# Patient Record
Sex: FEMALE | Race: OTHER | HISPANIC OR LATINO
[De-identification: names, ages, dates, MRNs, and addresses within clinical notes are randomized per-mention and may not be internally consistent; named-entity substitution may affect disease eponyms.]

---

## 2023-05-31 PROBLEM — Z00.00 ENCOUNTER FOR PREVENTIVE HEALTH EXAMINATION: Status: ACTIVE | Noted: 2023-05-31

## 2023-06-13 ENCOUNTER — APPOINTMENT (OUTPATIENT)
Dept: CARDIOLOGY | Facility: CLINIC | Age: 58
End: 2023-06-13

## 2023-08-15 ENCOUNTER — APPOINTMENT (OUTPATIENT)
Dept: CARDIOLOGY | Facility: CLINIC | Age: 58
End: 2023-08-15

## 2023-08-16 ENCOUNTER — INPATIENT (INPATIENT)
Facility: HOSPITAL | Age: 58
LOS: 1 days | Discharge: HOME HEALTH | DRG: 349 | End: 2023-08-18
Attending: INTERNAL MEDICINE | Admitting: STUDENT IN AN ORGANIZED HEALTH CARE EDUCATION/TRAINING PROGRAM
Payer: MEDICAID

## 2023-08-16 VITALS
DIASTOLIC BLOOD PRESSURE: 64 MMHG | HEART RATE: 110 BPM | HEIGHT: 59 IN | RESPIRATION RATE: 18 BRPM | SYSTOLIC BLOOD PRESSURE: 109 MMHG | TEMPERATURE: 98 F | OXYGEN SATURATION: 100 % | WEIGHT: 110.01 LBS

## 2023-08-16 DIAGNOSIS — M86.9 OSTEOMYELITIS, UNSPECIFIED: ICD-10-CM

## 2023-08-16 LAB
ALBUMIN SERPL ELPH-MCNC: 4.2 G/DL — SIGNIFICANT CHANGE UP (ref 3.5–5.2)
ALP SERPL-CCNC: 120 U/L — HIGH (ref 30–115)
ALT FLD-CCNC: 10 U/L — SIGNIFICANT CHANGE UP (ref 0–41)
ANION GAP SERPL CALC-SCNC: 13 MMOL/L — SIGNIFICANT CHANGE UP (ref 7–14)
AST SERPL-CCNC: 11 U/L — SIGNIFICANT CHANGE UP (ref 0–41)
BASOPHILS # BLD AUTO: 0.06 K/UL — SIGNIFICANT CHANGE UP (ref 0–0.2)
BASOPHILS NFR BLD AUTO: 0.4 % — SIGNIFICANT CHANGE UP (ref 0–1)
BILIRUB SERPL-MCNC: <0.2 MG/DL — SIGNIFICANT CHANGE UP (ref 0.2–1.2)
BUN SERPL-MCNC: 17 MG/DL — SIGNIFICANT CHANGE UP (ref 10–20)
CALCIUM SERPL-MCNC: 10.2 MG/DL — SIGNIFICANT CHANGE UP (ref 8.4–10.5)
CHLORIDE SERPL-SCNC: 98 MMOL/L — SIGNIFICANT CHANGE UP (ref 98–110)
CO2 SERPL-SCNC: 19 MMOL/L — SIGNIFICANT CHANGE UP (ref 17–32)
CREAT SERPL-MCNC: 0.8 MG/DL — SIGNIFICANT CHANGE UP (ref 0.7–1.5)
CRP SERPL-MCNC: 64.1 MG/L — HIGH
EGFR: 86 ML/MIN/1.73M2 — SIGNIFICANT CHANGE UP
EOSINOPHIL # BLD AUTO: 0.15 K/UL — SIGNIFICANT CHANGE UP (ref 0–0.7)
EOSINOPHIL NFR BLD AUTO: 1 % — SIGNIFICANT CHANGE UP (ref 0–8)
ERYTHROCYTE [SEDIMENTATION RATE] IN BLOOD: 108 MM/HR — HIGH (ref 0–20)
GLUCOSE SERPL-MCNC: 104 MG/DL — HIGH (ref 70–99)
HCT VFR BLD CALC: 34 % — LOW (ref 37–47)
HGB BLD-MCNC: 12.1 G/DL — SIGNIFICANT CHANGE UP (ref 12–16)
IMM GRANULOCYTES NFR BLD AUTO: 0.9 % — HIGH (ref 0.1–0.3)
LACTATE SERPL-SCNC: 1.2 MMOL/L — SIGNIFICANT CHANGE UP (ref 0.7–2)
LYMPHOCYTES # BLD AUTO: 17 % — LOW (ref 20.5–51.1)
LYMPHOCYTES # BLD AUTO: 2.44 K/UL — SIGNIFICANT CHANGE UP (ref 1.2–3.4)
MCHC RBC-ENTMCNC: 32.6 PG — HIGH (ref 27–31)
MCHC RBC-ENTMCNC: 35.6 G/DL — SIGNIFICANT CHANGE UP (ref 32–37)
MCV RBC AUTO: 91.6 FL — SIGNIFICANT CHANGE UP (ref 81–99)
MONOCYTES # BLD AUTO: 1.26 K/UL — HIGH (ref 0.1–0.6)
MONOCYTES NFR BLD AUTO: 8.8 % — SIGNIFICANT CHANGE UP (ref 1.7–9.3)
NEUTROPHILS # BLD AUTO: 10.29 K/UL — HIGH (ref 1.4–6.5)
NEUTROPHILS NFR BLD AUTO: 71.9 % — SIGNIFICANT CHANGE UP (ref 42.2–75.2)
NRBC # BLD: 0 /100 WBCS — SIGNIFICANT CHANGE UP (ref 0–0)
PLATELET # BLD AUTO: 455 K/UL — HIGH (ref 130–400)
PMV BLD: 8.9 FL — SIGNIFICANT CHANGE UP (ref 7.4–10.4)
POTASSIUM SERPL-MCNC: 4.8 MMOL/L — SIGNIFICANT CHANGE UP (ref 3.5–5)
POTASSIUM SERPL-SCNC: 4.8 MMOL/L — SIGNIFICANT CHANGE UP (ref 3.5–5)
PROT SERPL-MCNC: 7.5 G/DL — SIGNIFICANT CHANGE UP (ref 6–8)
RBC # BLD: 3.71 M/UL — LOW (ref 4.2–5.4)
RBC # FLD: 12.7 % — SIGNIFICANT CHANGE UP (ref 11.5–14.5)
SODIUM SERPL-SCNC: 130 MMOL/L — LOW (ref 135–146)
WBC # BLD: 14.33 K/UL — HIGH (ref 4.8–10.8)
WBC # FLD AUTO: 14.33 K/UL — HIGH (ref 4.8–10.8)

## 2023-08-16 PROCEDURE — 86140 C-REACTIVE PROTEIN: CPT

## 2023-08-16 PROCEDURE — 73630 X-RAY EXAM OF FOOT: CPT | Mod: 26,LT

## 2023-08-16 PROCEDURE — 83735 ASSAY OF MAGNESIUM: CPT

## 2023-08-16 PROCEDURE — 80053 COMPREHEN METABOLIC PANEL: CPT

## 2023-08-16 PROCEDURE — 36415 COLL VENOUS BLD VENIPUNCTURE: CPT

## 2023-08-16 PROCEDURE — 99285 EMERGENCY DEPT VISIT HI MDM: CPT

## 2023-08-16 PROCEDURE — 83930 ASSAY OF BLOOD OSMOLALITY: CPT

## 2023-08-16 PROCEDURE — 73610 X-RAY EXAM OF ANKLE: CPT | Mod: 26,LT

## 2023-08-16 PROCEDURE — 85652 RBC SED RATE AUTOMATED: CPT

## 2023-08-16 PROCEDURE — 85025 COMPLETE CBC W/AUTO DIFF WBC: CPT

## 2023-08-16 RX ORDER — CEFEPIME 1 G/1
1000 INJECTION, POWDER, FOR SOLUTION INTRAMUSCULAR; INTRAVENOUS ONCE
Refills: 0 | Status: COMPLETED | OUTPATIENT
Start: 2023-08-16 | End: 2023-08-16

## 2023-08-16 RX ADMIN — CEFEPIME 100 MILLIGRAM(S): 1 INJECTION, POWDER, FOR SOLUTION INTRAMUSCULAR; INTRAVENOUS at 22:12

## 2023-08-16 NOTE — ED PROVIDER NOTE - CONSIDERATION OF ADMISSION OBSERVATION
Patient with suspected osteomyelitis, admitted for further management and care Consideration of Admission/Observation

## 2023-08-16 NOTE — ED ADULT NURSE NOTE - PRO INTERPRETER NEED 2
A: Met with Colby to explain  role and to discuss aftercare options.  R: Patient currently sees Dr. Barron at [facility]. Patient signed DILIP for above provider. Patient is aware of aftercare options. Patient is interest in IOP and confirmed virtual capabilities.  P: Will collaborate with treatment team and with the patient before setting up further aftercare, which will be complete by time of discharge.     Shae Franke, LCSW, [facility]  8/20/2020      
English

## 2023-08-16 NOTE — ED ADULT TRIAGE NOTE - CHIEF COMPLAINT QUOTE
Patient presents to ED c/o left foot pain r/t infection. Patient told she may need sx to remove hardware in foot

## 2023-08-16 NOTE — ED PROVIDER NOTE - CLINICAL SUMMARY MEDICAL DECISION MAKING FREE TEXT BOX
57-year-old female with suspected osteomyelitis of the left ankle.  Imaging independently interpreted by me.  Labs ordered and reviewed.  Vital signs reviewed.  Orthopedics consulted.  Patient admitted for further management and care.

## 2023-08-16 NOTE — ED PROVIDER NOTE - OBJECTIVE STATEMENT
57 years old female history of hypertension, COPD sent in by her podiatrist for osteomyelitis and infection of the hardware to her left ankle.  As per patient, she broke her ankle over 1 year ago.  The ankle was repaired by a podiatrist at [facility] at that time.  Patient started having a small wound over the left ankle over the past 6 months and noted drainage.  Patient has been following up with her new podiatrist for wound care every week.  Had an x-ray done 3 days ago which was different from a month ago so her podiatrist is concerned she may have a infection to the bone.  Patient otherwise denies fever, chills, left calf swelling, increasing redness, chest pain and shortness of breath.    Patient also reports she has not been able to step on the left ankle since the surgery secondary to severe pain.

## 2023-08-16 NOTE — ED PROVIDER NOTE - ATTENDING APP SHARED VISIT CONTRIBUTION OF CARE
37-year-old female everyday smoker, history of asthma history of left ankle fracture over a year ago status post Ortho repair at [facility] sent by her podiatrist to this hospital for management of bone infection.  Of note, patient was recently admitted to [facility] for hyponatremia, she had x-ray done during hospital admission, she was discharged a few days ago, her podiatrist called her today with information x-rays abnormal and urging her to come to this hospital. Patient reports a chronic nonhealing slowly draining wound at the site of prior surgery. Middle-aged female resting in stretcher no acute distress, PERRL, pink conjunctive a, speaking full sentences, equal air entry, lungs clear to auscultation, RRR, well-perfused extremities, abdomen soft and nontender to palpation, no midline spine or CVA TTP, there is some mild swelling of the left lateral ankle, there is 2 mm skin opening on the superior aspect of the surgical wound,  dressing is stained with small amount of yellowish fluid, there is warmth at the site without overt cellulitis, tenderness to palpation, Patient is awake alert x3, no gross neurodeficit.  Plan: X-ray, labs, admit for suspected osteomyelitis.

## 2023-08-16 NOTE — ED ADULT NURSE NOTE - OBJECTIVE STATEMENT
Pt. c/o L ankle pain. Pt. states PCP sent her to the ED due to abnormal x-ray results. As per pt. doctor stated she may have an infection of the L ankle bone. Pt. states she had surgery 1 year ago and has "a hole that never healed."

## 2023-08-16 NOTE — ED PROVIDER NOTE - PROGRESS NOTE DETAILS
spoke with ortho resident and aware of consultation. admit to medicine, they will follow the case. spoke with patient Podiatrist from [facility] Dr Montoya (office# [phone], cell#[phone]), patient had normal ankle xray about 2 months ago but xray from 3 days ago had a fracture/bone destruction above the hardware concerning for osteomyelitis.

## 2023-08-16 NOTE — ED PROVIDER NOTE - PHYSICAL EXAMINATION
CONSTITUTIONAL: Well-appearing; in no apparent distress.   EYES: PERRL; EOM intact.   CARDIOVASCULAR: Normal S1, S2; no murmurs, rubs, or gallops.   RESPIRATORY: Normal chest excursion with respiration; breath sounds clear and equal bilaterally; no wheezes, rhonchi, or rales.  GI/: Normal bowel sounds; non-distended; non-tender; no palpable organomegaly.   MS: TTP to lateral aspect of left ankle with painful ROM. 1+ DP pulse. left foot nv intact.   SKIN: a small skin ulce noted to lateral malleoli of left ankle.   NEURO/PSYCH: A & O x 4; grossly unremarkable.

## 2023-08-17 LAB
ALBUMIN SERPL ELPH-MCNC: 3.5 G/DL — SIGNIFICANT CHANGE UP (ref 3.5–5.2)
ALP SERPL-CCNC: 103 U/L — SIGNIFICANT CHANGE UP (ref 30–115)
ALT FLD-CCNC: 8 U/L — SIGNIFICANT CHANGE UP (ref 0–41)
ANION GAP SERPL CALC-SCNC: 12 MMOL/L — SIGNIFICANT CHANGE UP (ref 7–14)
AST SERPL-CCNC: 11 U/L — SIGNIFICANT CHANGE UP (ref 0–41)
BASOPHILS # BLD AUTO: 0.08 K/UL — SIGNIFICANT CHANGE UP (ref 0–0.2)
BASOPHILS NFR BLD AUTO: 0.7 % — SIGNIFICANT CHANGE UP (ref 0–1)
BILIRUB SERPL-MCNC: 0.2 MG/DL — SIGNIFICANT CHANGE UP (ref 0.2–1.2)
BUN SERPL-MCNC: 18 MG/DL — SIGNIFICANT CHANGE UP (ref 10–20)
CALCIUM SERPL-MCNC: 9.3 MG/DL — SIGNIFICANT CHANGE UP (ref 8.4–10.5)
CHLORIDE SERPL-SCNC: 101 MMOL/L — SIGNIFICANT CHANGE UP (ref 98–110)
CO2 SERPL-SCNC: 17 MMOL/L — SIGNIFICANT CHANGE UP (ref 17–32)
CREAT SERPL-MCNC: 0.7 MG/DL — SIGNIFICANT CHANGE UP (ref 0.7–1.5)
CRP SERPL-MCNC: 53.5 MG/L — HIGH
EGFR: 101 ML/MIN/1.73M2 — SIGNIFICANT CHANGE UP
EOSINOPHIL # BLD AUTO: 0.28 K/UL — SIGNIFICANT CHANGE UP (ref 0–0.7)
EOSINOPHIL NFR BLD AUTO: 2.3 % — SIGNIFICANT CHANGE UP (ref 0–8)
ERYTHROCYTE [SEDIMENTATION RATE] IN BLOOD: 88 MM/HR — HIGH (ref 0–20)
GLUCOSE SERPL-MCNC: 121 MG/DL — HIGH (ref 70–99)
HCT VFR BLD CALC: 31.5 % — LOW (ref 37–47)
HGB BLD-MCNC: 11 G/DL — LOW (ref 12–16)
IMM GRANULOCYTES NFR BLD AUTO: 1 % — HIGH (ref 0.1–0.3)
LYMPHOCYTES # BLD AUTO: 1.9 K/UL — SIGNIFICANT CHANGE UP (ref 1.2–3.4)
LYMPHOCYTES # BLD AUTO: 15.9 % — LOW (ref 20.5–51.1)
MAGNESIUM SERPL-MCNC: 1.9 MG/DL — SIGNIFICANT CHANGE UP (ref 1.8–2.4)
MCHC RBC-ENTMCNC: 32.3 PG — HIGH (ref 27–31)
MCHC RBC-ENTMCNC: 34.9 G/DL — SIGNIFICANT CHANGE UP (ref 32–37)
MCV RBC AUTO: 92.4 FL — SIGNIFICANT CHANGE UP (ref 81–99)
MONOCYTES # BLD AUTO: 1.23 K/UL — HIGH (ref 0.1–0.6)
MONOCYTES NFR BLD AUTO: 10.3 % — HIGH (ref 1.7–9.3)
NEUTROPHILS # BLD AUTO: 8.34 K/UL — HIGH (ref 1.4–6.5)
NEUTROPHILS NFR BLD AUTO: 69.8 % — SIGNIFICANT CHANGE UP (ref 42.2–75.2)
NRBC # BLD: 0 /100 WBCS — SIGNIFICANT CHANGE UP (ref 0–0)
OSMOLALITY SERPL: 273 MOS/KG — LOW (ref 275–300)
PLATELET # BLD AUTO: 402 K/UL — HIGH (ref 130–400)
PMV BLD: 8.8 FL — SIGNIFICANT CHANGE UP (ref 7.4–10.4)
POTASSIUM SERPL-MCNC: 4.3 MMOL/L — SIGNIFICANT CHANGE UP (ref 3.5–5)
POTASSIUM SERPL-SCNC: 4.3 MMOL/L — SIGNIFICANT CHANGE UP (ref 3.5–5)
PROT SERPL-MCNC: 6.5 G/DL — SIGNIFICANT CHANGE UP (ref 6–8)
RBC # BLD: 3.41 M/UL — LOW (ref 4.2–5.4)
RBC # FLD: 12.7 % — SIGNIFICANT CHANGE UP (ref 11.5–14.5)
SODIUM SERPL-SCNC: 130 MMOL/L — LOW (ref 135–146)
WBC # BLD: 11.95 K/UL — HIGH (ref 4.8–10.8)
WBC # FLD AUTO: 11.95 K/UL — HIGH (ref 4.8–10.8)

## 2023-08-17 PROCEDURE — 99222 1ST HOSP IP/OBS MODERATE 55: CPT | Mod: 1L

## 2023-08-17 PROCEDURE — 99223 1ST HOSP IP/OBS HIGH 75: CPT

## 2023-08-17 PROCEDURE — 99222 1ST HOSP IP/OBS MODERATE 55: CPT

## 2023-08-17 RX ORDER — CEFEPIME 1 G/1
2000 INJECTION, POWDER, FOR SOLUTION INTRAMUSCULAR; INTRAVENOUS EVERY 8 HOURS
Refills: 0 | Status: DISCONTINUED | OUTPATIENT
Start: 2023-08-17 | End: 2023-08-17

## 2023-08-17 RX ORDER — VANCOMYCIN HCL 1 G
500 VIAL (EA) INTRAVENOUS EVERY 12 HOURS
Refills: 0 | Status: DISCONTINUED | OUTPATIENT
Start: 2023-08-17 | End: 2023-08-17

## 2023-08-17 RX ORDER — ACETAMINOPHEN 500 MG
650 TABLET ORAL EVERY 6 HOURS
Refills: 0 | Status: DISCONTINUED | OUTPATIENT
Start: 2023-08-17 | End: 2023-08-18

## 2023-08-17 RX ORDER — ENOXAPARIN SODIUM 100 MG/ML
40 INJECTION SUBCUTANEOUS EVERY 24 HOURS
Refills: 0 | Status: DISCONTINUED | OUTPATIENT
Start: 2023-08-17 | End: 2023-08-18

## 2023-08-17 RX ORDER — AMLODIPINE BESYLATE 2.5 MG/1
2.5 TABLET ORAL DAILY
Refills: 0 | Status: DISCONTINUED | OUTPATIENT
Start: 2023-08-17 | End: 2023-08-18

## 2023-08-17 RX ADMIN — AMLODIPINE BESYLATE 2.5 MILLIGRAM(S): 2.5 TABLET ORAL at 06:27

## 2023-08-17 RX ADMIN — Medication 100 MILLIGRAM(S): at 06:27

## 2023-08-17 RX ADMIN — Medication 650 MILLIGRAM(S): at 09:51

## 2023-08-17 RX ADMIN — CEFEPIME 100 MILLIGRAM(S): 1 INJECTION, POWDER, FOR SOLUTION INTRAMUSCULAR; INTRAVENOUS at 05:50

## 2023-08-17 RX ADMIN — ENOXAPARIN SODIUM 40 MILLIGRAM(S): 100 INJECTION SUBCUTANEOUS at 05:50

## 2023-08-17 RX ADMIN — Medication 650 MILLIGRAM(S): at 10:45

## 2023-08-17 NOTE — PROGRESS NOTE ADULT - ASSESSMENT
The patient is a 57 year old female PMH HTN, Asthma, 40 pack year smoker presents with pain in left ankle.     #Possible OM of left ankle  -Patient underwent ORIF of a L ankle Fx in Aug 2022 at [facility], after which, she reports she has had a chronic draining sinus tract with a small wound at the lateral ankle that never healed.    -She follows with podiatrist Dr. Núñez at [facility]. She recently obtained an XR which demonstrated a fibular fracture at the proximal aspect of the distal fibula plate  -She recently saw Dr. Núñez who advised her to go to the hospital due to the concern that her hardware was infected.   -s/p 1 dose cefepime in ED  -c/w cefepime and start vanc  -X-ray foot and ankle performed, follow up read  -Weight Bearing Status; NWB LLE  -ID consult- no abx  -podiatry consult-patient would likely need hardware removal w/ intraop bone biopsy, Patient will continue to be followed by Ortho team at this time and no podiatry intervention is required  Patient can f/u as an outpatient with Dr Nathan if she chooses for wound care management  -ortho saw patient and recommended: NWB LLE, Trend ESR/CRP/WBC, s/p prof ankle with chronic draining lateral wound, sx done by dr núñez, either fu with dr núñez or podiatry, no ortho intervention    #Hyponatremia  -patient has history of hyponatremia and was previously hospitalized  -obtain urine Na and osm, serum osm    #HTN  -c/w home meds    DVT proph: lovenox subq  Gi ppx: n/a  Diet: Dash/TLC  Activity: NWB   The patient is a 57 year old female PMH HTN, Asthma, 40 pack year smoker presents with pain in left ankle.     #Possible OM of left ankle  -Patient underwent ORIF of a L ankle Fx in Aug 2022 at [facility], after which, she reports she has had a chronic draining sinus tract with a small wound at the lateral ankle that never healed.    -She follows with podiatrist Dr. Núñez at [facility]. She recently obtained an XR which demonstrated a fibular fracture at the proximal aspect of the distal fibula plate  -She recently saw Dr. Núñez who advised her to go to the hospital due to the concern that her hardware was infected.   -s/p 1 dose cefepime in ED  -c/w cefepime and start vanc  -X-ray foot and ankle performed, follow up read  -Weight Bearing Status; NWB LLE  -ID consult- no abx  -podiatry consult-patient would likely need hardware removal w/ intraop bone biopsy, Patient will continue to be followed by Ortho team at this time and no podiatry intervention is required  Patient can f/u as an outpatient with Dr Nathan if she chooses for wound care management  -ortho saw patient and recommended: NWB LLE, Trend ESR/CRP/WBC, s/p prof ankle with chronic draining lateral wound, sx done by dr núñez, either fu with dr núñez or podiatry, no ortho intervention  - upon personally speaking with the patient she does not want to leave and was told by Dr. Núñez that she would receive treatment here as the care provided at [facility] is inefficient    #Hyponatremia  -patient has history of hyponatremia and was previously hospitalized  -obtain urine Na and osm, serum osm    #HTN  -c/w home meds    DVT proph: lovenox subq  Gi ppx: n/a  Diet: Dash/TLC  Activity: NWB

## 2023-08-17 NOTE — H&P ADULT - NSHPLABSRESULTS_GEN_ALL_CORE
12.1   14.33 )-----------( 455      ( 16 Aug 2023 21:07 )             34.0       08-16    130<L>  |  98  |  17  ----------------------------<  104<H>  4.8   |  19  |  0.8    Ca    10.2      16 Aug 2023 21:07    TPro  7.5  /  Alb  4.2  /  TBili  <0.2  /  DBili  x   /  AST  11  /  ALT  10  /  AlkPhos  120<H>  08-16              Urinalysis Basic - ( 16 Aug 2023 21:07 )    Color: x / Appearance: x / SG: x / pH: x  Gluc: 104 mg/dL / Ketone: x  / Bili: x / Urobili: x   Blood: x / Protein: x / Nitrite: x   Leuk Esterase: x / RBC: x / WBC x   Sq Epi: x / Non Sq Epi: x / Bacteria: x            Lactate Trend  08-16 @ 21:07 Lactate:1.2             CAPILLARY BLOOD GLUCOSE

## 2023-08-17 NOTE — CONSULT NOTE ADULT - SUBJECTIVE AND OBJECTIVE BOX
BILLIE LUZ MARINA  57y, Female  Allergy: penicillins (Unknown)      All historical available data reviewed.    HPI:  The patient is a 57 year old female PMH HTN, Asthma, 40 pack year smoker presents with pain in left ankle. She underwent ORIF of a L ankle Fx in Aug 2022 at [facility], after which, she reports she has had a chronic draining sinus tract with a small wound at the lateral ankle that never healed. She reports that it drains serous fluid. She says that her ankle has been continuously painful since surgery. She follows with podiatrist Dr. Montoya at [facility]. She recently obtained an XR which demonstrated a fibular fracture at the proximal aspect of the distal fibula plate, which apparently had not been present on earlier imaging. She recently saw Dr. Montoya who advised her to go to the hospital due to the concern that her hardware was infected.  She denies fever, chills, n/v, chest pain, sob, she only complains of left ankle pain.    In ED vitals significant for   Labs: WBC 14.33, , Na 130, CRP 64  X-ray foot and ankle performed  Patient was given cefepime in ED (17 Aug 2023 03:23)    FAMILY HISTORY:    PAST MEDICAL & SURGICAL HISTORY:  HTN (hypertension)      COPD (chronic obstructive pulmonary disease)            VITALS:  T(F): 97, Max: 98.3 (08-16-23 @ 18:40)  HR: 89  BP: 128/67  RR: 18Vital Signs Last 24 Hrs  T(C): 36.1 (17 Aug 2023 05:04), Max: 36.8 (16 Aug 2023 18:40)  T(F): 97 (17 Aug 2023 05:04), Max: 98.3 (16 Aug 2023 18:40)  HR: 89 (17 Aug 2023 05:04) (75 - 110)  BP: 128/67 (17 Aug 2023 05:04) (109/64 - 128/67)  BP(mean): --  RR: 18 (17 Aug 2023 05:04) (18 - 18)  SpO2: 100% (17 Aug 2023 00:00) (100% - 100%)    Parameters below as of 17 Aug 2023 00:00  Patient On (Oxygen Delivery Method): room air        TESTS & MEASUREMENTS:                        12.1   14.33 )-----------( 455      ( 16 Aug 2023 21:07 )             34.0     08-16    130<L>  |  98  |  17  ----------------------------<  104<H>  4.8   |  19  |  0.8    Ca    10.2      16 Aug 2023 21:07    TPro  7.5  /  Alb  4.2  /  TBili  <0.2  /  DBili  x   /  AST  11  /  ALT  10  /  AlkPhos  120<H>  08-16    LIVER FUNCTIONS - ( 16 Aug 2023 21:07 )  Alb: 4.2 g/dL / Pro: 7.5 g/dL / ALK PHOS: 120 U/L / ALT: 10 U/L / AST: 11 U/L / GGT: x             Urinalysis Basic - ( 16 Aug 2023 21:07 )    Color: x / Appearance: x / SG: x / pH: x  Gluc: 104 mg/dL / Ketone: x  / Bili: x / Urobili: x   Blood: x / Protein: x / Nitrite: x   Leuk Esterase: x / RBC: x / WBC x   Sq Epi: x / Non Sq Epi: x / Bacteria: x          RADIOLOGY & ADDITIONAL TESTS:  Personal review of radiological diagnostics performed  Echo and EKG results noted when applicable.     MEDICATIONS:  acetaminophen     Tablet .. 650 milliGRAM(s) Oral every 6 hours PRN  amLODIPine   Tablet 2.5 milliGRAM(s) Oral daily  enoxaparin Injectable 40 milliGRAM(s) SubCutaneous every 24 hours      ANTIBIOTICS:

## 2023-08-17 NOTE — CONSULT NOTE ADULT - ATTENDING COMMENTS
s/p left ankle ORIF at [facility] a year ago. Patient reports persistent ankle pain and a chronic ankle ulcer  noted small ulceration at lateral malleolus. no active drainage appreciated. TTP  xrays: fibular fracture just proximal to the hardware.    concerning for chronic OM  patient would likely need hardware removal w/ intraop bone biopsy  ID on board  ortho on board--> follow up plan/surgical intervention?
s/p prof ankle with chronic draining lateral wound  sx done by dr núñez  podiatry involved  either fu with dr núñez or podiatry  no ortho intervention

## 2023-08-17 NOTE — PATIENT PROFILE ADULT - FALL HARM RISK - HARM RISK INTERVENTIONS

## 2023-08-17 NOTE — H&P ADULT - ATTENDING COMMENTS
Patient seen and examined at bedside independently of the residents. I read the resident's note and agree with the plan with the additions and corrections as noted below. My note supersedes the resident's note.     REVIEW OF SYSTEMS:  Negative except in HPI.       PMH: Asthma, daily smoker and left ankle fracture s/p ORIF at [facility] (1 yr ago)    FHx: Reviewed. No fhx of asthma/copd, No fhx of liver and pulmonary disease. No fhx of hematological disorder.     Physical Exam:  GEN: No acute distress. Awake, Alert and oriented x 3.   Head: Atraumatic, Normocephalic.   Eye: PEERLA. No sclera icterus. EOMI.   ENT: Normal oropharynx, no thyromegaly, no mass, no lymphadenopathy.   LUNGS: Clear to auscultation bilaterally. No wheeze/rales/crackles.   HEART: Normal. S1/S2 present. RRR. No murmur/gallops.   ABD: Soft, non-tender, non-distended. Bowel sounds present.   EXT: No pitting edema. No erythema. + left ankle sinus draining wound a/w mild erythema and tenderness.   Integumentary: No rash, No sore, No petechia.   NEURO: CN III-XII intact. Strength: 5/5 b/l ULE. Sensory intact b/l ULE.     Vital Signs Last 24 Hrs  T(C): 36.4 (17 Aug 2023 00:00), Max: 36.8 (16 Aug 2023 18:40)  T(F): 97.5 (17 Aug 2023 00:00), Max: 98.3 (16 Aug 2023 18:40)  HR: 75 (17 Aug 2023 00:00) (75 - 110)  BP: 114/66 (17 Aug 2023 00:00) (109/64 - 114/66)  BP(mean): --  RR: 18 (17 Aug 2023 00:00) (18 - 18)  SpO2: 100% (17 Aug 2023 00:00) (100% - 100%)    Parameters below as of 17 Aug 2023 00:00  Patient On (Oxygen Delivery Method): room air      Please see the above notes for Labs and radiology.     Assessment and Plan:     56 yo F with hx of Asthma, daily smoker and left ankle fracture s/p ORIF at [facility] (1 yr ago) send in by podiatry for suspected OM.     Chronic left ankle sinus wound with suspected OM and infected hardware  - Sepsis presents on admission (WBC and HR)  - s/p cefepime x 1 in ED.   - c/w Vanc and cefepime for now.   - f/u left ankle and foot X-ray   - NWB LLE   - f/u BCx, ESR/CRP   - check HbA1c  - ID consult   - f/u Orthopedic     Asthma - not in exacerbation. c/w home inhalers prn.     DVT ppx: Lovenox SC  GI ppx: PPI   Diet: DASH diet  Activity: as tolerated.     Date seen by the attendin2023  Total time spent: 75 minutes. Patient seen and examined at bedside independently of the residents. I read the resident's note and agree with the plan with the additions and corrections as noted below. My note supersedes the resident's note.     REVIEW OF SYSTEMS:  Negative except in HPI.       PMH: Asthma, daily smoker and left ankle fracture s/p ORIF at [facility] (1 yr ago)    FHx: Reviewed. No fhx of asthma/copd, No fhx of liver and pulmonary disease. No fhx of hematological disorder.     Physical Exam:  GEN: No acute distress. Awake, Alert and oriented x 3.   Head: Atraumatic, Normocephalic.   Eye: PEERLA. No sclera icterus. EOMI.   ENT: Normal oropharynx, no thyromegaly, no mass, no lymphadenopathy.   LUNGS: Clear to auscultation bilaterally. No wheeze/rales/crackles.   HEART: Normal. S1/S2 present. RRR. No murmur/gallops.   ABD: Soft, non-tender, non-distended. Bowel sounds present.   EXT: No pitting edema. No erythema. + left ankle sinus draining wound a/w mild erythema and tenderness.   Integumentary: No rash, No sore, No petechia.   NEURO: CN III-XII intact. Strength: 5/5 b/l ULE. Sensory intact b/l ULE.     Vital Signs Last 24 Hrs  T(C): 36.4 (17 Aug 2023 00:00), Max: 36.8 (16 Aug 2023 18:40)  T(F): 97.5 (17 Aug 2023 00:00), Max: 98.3 (16 Aug 2023 18:40)  HR: 75 (17 Aug 2023 00:00) (75 - 110)  BP: 114/66 (17 Aug 2023 00:00) (109/64 - 114/66)  BP(mean): --  RR: 18 (17 Aug 2023 00:00) (18 - 18)  SpO2: 100% (17 Aug 2023 00:00) (100% - 100%)    Parameters below as of 17 Aug 2023 00:00  Patient On (Oxygen Delivery Method): room air      Please see the above notes for Labs and radiology.     Assessment and Plan:     58 yo F with hx of Asthma, daily smoker and left ankle fracture s/p ORIF at [facility] (1 yr ago) send in by podiatry for suspected OM.     Chronic left ankle sinus wound with suspected OM and infected hardware  - Sepsis presents on admission (WBC and HR)  - s/p cefepime x 1 in ED.   - c/w Vanc and cefepime for now.   - f/u left ankle and foot X-ray   - NWB LLE   - f/u BCx, ESR/CRP   - check HbA1c  - ID consult   - f/u Orthopedic     HTN - c/w home med  Asthma - not in exacerbation. c/w home inhalers prn.     DVT ppx: Lovenox SC  GI ppx: PPI   Diet: DASH diet  Activity: as tolerated.     Date seen by the attendin2023  Total time spent: 75 minutes.

## 2023-08-17 NOTE — CONSULT NOTE ADULT - ASSESSMENT
57 year old female PMH HTN, Asthma, 40 pack year smoker presents with pain in left ankle. She underwent ORIF of a L ankle Fx in Aug 2022 at [facility], after which, she reports she has had a chronic draining sinus tract with a small wound at the lateral ankle that never healed. She reports that it drains serous fluid. She says that her ankle has been continuously painful since surgery. She follows with podiatrist Dr. Montoya at [facility]. She recently obtained an XR which demonstrated a fibular fracture at the proximal aspect of the distal fibula plate, which apparently had not been present on earlier imaging. She recently saw Dr. Montoya who advised her to go to the hospital due to the concern that her hardware was infected.  She denies fever, chills, n/v, chest pain, sob, she only complains of left ankle pain.    IMPRESSION/RECOMMENDATIONS  Chronic OM ( draining sinus since >6 mths ) left lateral malleolus with possible infected hardware.  ESR//64.1    -ABX alone ( especially given empirically ) will not achieve a cure  -evaluation by Dr Montoya for deep tissue cultures and possible revision  -no empiric ABx

## 2023-08-17 NOTE — CONSULT NOTE ADULT - SUBJECTIVE AND OBJECTIVE BOX
ORTHOPAEDIC SURGERY CONSULT NOTE    Reason for Consult: Infected left ankle    HPI: 57yFemale PMH HTN, Asthma, 40 pack year smoker presents with pain in left ankle. She underwent ORIF of a L ankle Fx in Aug 2022 at [facility], after which, she reports she has had a chronic draining sinus tract with a small wound at the lateral ankle that never healed. She reports that it drains serous fluid. She says that her ankle has been continuously painful since surgery. She follows with podiatrist Dr. Montoya at [facility]. Apparently, she was planning to undergo revision surgery at some point in the recent past, but was unable to because she was admitted to [facility] with hyponatremia that has since resolved.     She recently obtained an XR which demonstrated a fibular fracture at the proximal aspect of the distal fibula plate, which apparently had not been present on earlier imaging. She denies any recent falls/trauma. She walks with a walker and CAM boot. Per the patient, Dr. Montoya advised her to go to the ED due to the concern for infected hardware, and she presented to [facility] because she disliked her care at [facility] and her   at [facility] in December.    Denies fevers/chills/nausea/vomiting/diarrhea. Denies paresthesias.    Patient afebrile and hemodynamically stable. WBC 14.3, , CRP 64.    40PPY smoker, still active  Occasional ETOH  Denies illicit drug use    PAST MEDICAL & SURGICAL HISTORY:    Allergies: penicillins (Unknown)    Medications: acetaminophen     Tablet .. 650 milliGRAM(s) Oral every 6 hours PRN    Home meds: Amlodipine 2.5mg daily    PHYSICAL EXAM:  Vital Signs Last 24 Hrs  T(C): 36.4 (17 Aug 2023 00:00), Max: 36.8 (16 Aug 2023 18:40)  T(F): 97.5 (17 Aug 2023 00:00), Max: 98.3 (16 Aug 2023 18:40)  HR: 75 (17 Aug 2023 00:00) (75 - 110)  BP: 114/66 (17 Aug 2023 00:00) (109/64 - 114/66)  BP(mean): --  RR: 18 (17 Aug 2023 00:00) (18 - 18)  SpO2: 100% (17 Aug 2023 00:00) (100% - 100%)    Parameters below as of 17 Aug 2023 00:00  Patient On (Oxygen Delivery Method): room air    Physical Exam:  General: NAD. AAOx3.  Resp: NLB on RA.    LLE:   Small subcentimeter open wound proximal to the lateral malleolus, prior surgical scar is otherwise healed  Minimal seropurulent drainage  Mild surrounding erythema, swelling  TTP lateral ankle  Decreased active ROM of ankle  SILT DP/SP/T/Gaines/Sa.   EHL/FHL/TA/Gs motor intact.  2+ DP/PT pulses with brisk cap refill distally.  Compartments soft and compressible. No pain on passive stretch.    Labs:                        12.1   14.33 )-----------( 455      ( 16 Aug 2023 21:07 )             34.0     08-    130<L>  |  98  |  17  ----------------------------<  104<H>  4.8   |  19  |  0.8    Ca    10.2      16 Aug 2023 21:07    TPro  7.5  /  Alb  4.2  /  TBili  <0.2  /  DBili  x   /  AST  11  /  ALT  10  /  AlkPhos  120<H>  -16        Imaging:  XR: L foot and ankle: No acute fracture or dislocation. There is a distal fibula plate and interfragmentary lag screw. There does not appear to be any broken hardware. There is an area of fracture with callus at the proximal aspect of the plate.    A/P: 57y Female s/p ankle ORIF 1 year prior with draining sinus wound over lateral ankle concerning for infected hardware.    - Pain control  - NWB LLE  - Trend ESR/CRP/WBC  - Diet is OK for now  - DVT ppx per primary  - Home meds  - Plan pending discussion with patient's podiatrist Dr. Montoya and with [facility] attending F&A surgeon ORTHOPAEDIC SURGERY CONSULT NOTE    Reason for Consult: Infected left ankle    HPI: 57yFemale PMH HTN, Asthma, 40 pack year smoker presents with pain in left ankle. She underwent ORIF of a L ankle Fx in Aug 2022 at [facility], after which, she reports she has had a chronic draining sinus tract with a small wound at the lateral ankle that never healed. She reports that it drains serous fluid. She says that her ankle has been continuously painful since surgery. She follows with podiatrist Dr. Montoya at [facility]. Apparently, she was planning to undergo revision surgery at some point in the recent past, but was unable to because she was admitted to [facility] with hyponatremia that has since resolved.     She recently obtained an XR which demonstrated a fibular fracture at the proximal aspect of the distal fibula plate, which apparently had not been present on earlier imaging. She denies any recent falls/trauma. She walks with a walker and CAM boot. Per the patient, Dr. Montoya advised her to go to the ED due to the concern for infected hardware, and she presented to [facility] because she disliked her care at [facility] and her   at [facility] in December.    Denies fevers/chills/nausea/vomiting/diarrhea. Denies paresthesias.    Patient afebrile and hemodynamically stable. WBC 14.3, , CRP 64.    40PPY smoker, still active  Occasional ETOH  Denies illicit drug use    PAST MEDICAL & SURGICAL HISTORY:    Allergies: penicillins (Unknown)    Medications: acetaminophen     Tablet .. 650 milliGRAM(s) Oral every 6 hours PRN    Home meds: Amlodipine 2.5mg daily    PHYSICAL EXAM:  Vital Signs Last 24 Hrs  T(C): 36.4 (17 Aug 2023 00:00), Max: 36.8 (16 Aug 2023 18:40)  T(F): 97.5 (17 Aug 2023 00:00), Max: 98.3 (16 Aug 2023 18:40)  HR: 75 (17 Aug 2023 00:00) (75 - 110)  BP: 114/66 (17 Aug 2023 00:00) (109/64 - 114/66)  BP(mean): --  RR: 18 (17 Aug 2023 00:00) (18 - 18)  SpO2: 100% (17 Aug 2023 00:00) (100% - 100%)    Parameters below as of 17 Aug 2023 00:00  Patient On (Oxygen Delivery Method): room air    Physical Exam:  General: NAD. AAOx3.  Resp: NLB on RA.    LLE:   Small subcentimeter open wound proximal to the lateral malleolus, prior surgical scar is otherwise healed  Minimal seropurulent drainage  Mild surrounding erythema, swelling  TTP lateral ankle  Decreased active ROM of ankle  SILT DP/SP/T/Gaines/Sa.   EHL/FHL/TA/Gs motor intact.  2+ DP/PT pulses with brisk cap refill distally.  Compartments soft and compressible. No pain on passive stretch.    Labs:                        12.1   14.33 )-----------( 455      ( 16 Aug 2023 21:07 )             34.0     08-    130<L>  |  98  |  17  ----------------------------<  104<H>  4.8   |  19  |  0.8    Ca    10.2      16 Aug 2023 21:07    TPro  7.5  /  Alb  4.2  /  TBili  <0.2  /  DBili  x   /  AST  11  /  ALT  10  /  AlkPhos  120<H>  08-16        Imaging:  XR: L foot and ankle: There is a distal fibula plate and interfragmentary lag screw. There does not appear to be any broken hardware. There is an area of fracture with callus at the proximal aspect of the plate.    A/P: 57y Female s/p ankle ORIF 1 year prior with draining sinus wound over lateral ankle concerning for infected hardware.    - Pain control  - NWB LLE  - Trend ESR/CRP/WBC  - Diet is OK for now  - DVT ppx per primary  - Home meds  - Plan pending discussion with patient's podiatrist Dr. Montoya and with [facility] attending F&A surgeon

## 2023-08-17 NOTE — CONSULT NOTE ADULT - SUBJECTIVE AND OBJECTIVE BOX
Podiatry Consult Note    Subjective:  LUZ MARINA WISE  Seen Bedside 57y Female  .   Patient is a 57y old  Female who presents with a chief complaint of   HPI:  The patient is a 57 year old female PMH HTN, Asthma, 40 pack year smoker presents with pain in left ankle. She underwent ORIF of a L ankle Fx in Aug 2022 at [facility], after which, she reports she has had a chronic draining sinus tract with a small wound at the lateral ankle that never healed. She reports that it drains serous fluid. She says that her ankle has been continuously painful since surgery. She follows with podiatrist Dr. Montoya at [facility]. She recently obtained an XR which demonstrated a fibular fracture at the proximal aspect of the distal fibula plate, which apparently had not been present on earlier imaging. She recently saw Dr. Montoya who advised her to go to the hospital due to the concern that her hardware was infected.  She denies fever, chills, n/v, chest pain, sob, she only complains of left ankle pain.    In ED vitals significant for   Labs: WBC 14.33, , Na 130, CRP 64  X-ray foot and ankle performed  Patient was given cefepime in ED (17 Aug 2023 03:23)      Past Medical History and Surgical History  PAST MEDICAL & SURGICAL HISTORY:  HTN (hypertension)      COPD (chronic obstructive pulmonary disease)           Review of Systems:  [X] Ten point review of systems is otherwise negative except as noted     Objective:  Vital Signs Last 24 Hrs  T(C): 36.1 (17 Aug 2023 05:04), Max: 36.8 (16 Aug 2023 18:40)  T(F): 97 (17 Aug 2023 05:04), Max: 98.3 (16 Aug 2023 18:40)  HR: 89 (17 Aug 2023 05:04) (75 - 110)  BP: 128/67 (17 Aug 2023 05:04) (109/64 - 128/67)  BP(mean): --  RR: 18 (17 Aug 2023 05:04) (18 - 18)  SpO2: 100% (17 Aug 2023 00:00) (100% - 100%)    Parameters below as of 17 Aug 2023 00:00  Patient On (Oxygen Delivery Method): room air                            11.0   11.95 )-----------( 402      ( 17 Aug 2023 07:36 )             31.5                 08-17    130<L>  |  101  |  18  ----------------------------<  121<H>  4.3   |  17  |  0.7    Ca    9.3      17 Aug 2023 07:36  Mg     1.9     08-17    TPro  6.5  /  Alb  3.5  /  TBili  0.2  /  DBili  x   /  AST  11  /  ALT  8   /  AlkPhos  103  08-17        Physical Exam - Lower Extremity Focused:   Derm: LLE: Small subcentimeter open wound proximal to the lateral malleolus, prior surgical scar is otherwise healed  Minimal seropurulent drainage  Mild surrounding erythema, swelling  Vascular: DP and PT Pulses Diminished; Foot is Warm to Warm to the touch; Capillary Refill Time < 3 Seconds;    Neuro: Protective Sensation intact  MSK: Pain On Palpation at Wound Site     Assessment:  Right Forefoot Plantar Ulcer (4th Submetatarsal head) - Probes Deep to Bone  Cyanosis Right 4th Digit Extending to MTP Joint  Cellulitic Right Lower Extremity     Plan:  Chart reviewed and Patient evaluated. All Questions and Concerns Addressed and Answered  XR Imaging  Foot;  There is a distal fibula plate and interfragmentary lag screw. There does not appear to be any broken hardware. There is an area of fracture with callus at the proximal aspect of the plate.  Local Wound Care;  DSD   Weight Bearing Status; NWB LLE   Patient was seen and treatment options were discussed. Patient will continue to be followed by Ortho team at this time and no podiatry intervention is required  Patient can f/u as an outpatient with Dr Nathan if she chooses for wound care management  Discussed Plan w/ Dr Nathan     Podiatry  Podiatry Consult Note    Subjective:  LUZ MARINA WISE  Seen Bedside 57y Female  .   Patient is a 57y old  Female who presents with a chief complaint of   HPI:  The patient is a 57 year old female PMH HTN, Asthma, 40 pack year smoker presents with pain in left ankle. She underwent ORIF of a L ankle Fx in Aug 2022 at [facility], after which, she reports she has had a chronic draining sinus tract with a small wound at the lateral ankle that never healed. She reports that it drains serous fluid. She says that her ankle has been continuously painful since surgery. She follows with podiatrist Dr. Montoya at [facility]. She recently obtained an XR which demonstrated a fibular fracture at the proximal aspect of the distal fibula plate, which apparently had not been present on earlier imaging. She recently saw Dr. Montoya who advised her to go to the hospital due to the concern that her hardware was infected.  She denies fever, chills, n/v, chest pain, sob, she only complains of left ankle pain.    In ED vitals significant for   Labs: WBC 14.33, , Na 130, CRP 64  X-ray foot and ankle performed  Patient was given cefepime in ED (17 Aug 2023 03:23)      Past Medical History and Surgical History  PAST MEDICAL & SURGICAL HISTORY:  HTN (hypertension)      COPD (chronic obstructive pulmonary disease)           Review of Systems:  [X] Ten point review of systems is otherwise negative except as noted     Objective:  Vital Signs Last 24 Hrs  T(C): 36.1 (17 Aug 2023 05:04), Max: 36.8 (16 Aug 2023 18:40)  T(F): 97 (17 Aug 2023 05:04), Max: 98.3 (16 Aug 2023 18:40)  HR: 89 (17 Aug 2023 05:04) (75 - 110)  BP: 128/67 (17 Aug 2023 05:04) (109/64 - 128/67)  BP(mean): --  RR: 18 (17 Aug 2023 05:04) (18 - 18)  SpO2: 100% (17 Aug 2023 00:00) (100% - 100%)    Parameters below as of 17 Aug 2023 00:00  Patient On (Oxygen Delivery Method): room air                            11.0   11.95 )-----------( 402      ( 17 Aug 2023 07:36 )             31.5                 08-17    130<L>  |  101  |  18  ----------------------------<  121<H>  4.3   |  17  |  0.7    Ca    9.3      17 Aug 2023 07:36  Mg     1.9     08-17    TPro  6.5  /  Alb  3.5  /  TBili  0.2  /  DBili  x   /  AST  11  /  ALT  8   /  AlkPhos  103  08-17        Physical Exam - Lower Extremity Focused:   Derm: LLE: Small subcentimeter open wound proximal to the lateral malleolus, prior surgical scar is otherwise healed  Minimal seropurulent drainage  Mild surrounding erythema, swelling  Vascular: DP and PT Pulses Diminished; Foot is Warm to Warm to the touch; Capillary Refill Time < 3 Seconds;    Neuro: Protective Sensation intact  MSK: Pain On Palpation at Wound Site     Assessment:  Left lateral ankle draining chronic wound    Plan:  Chart reviewed and Patient evaluated. All Questions and Concerns Addressed and Answered  XR Imaging  Foot;  There is a distal fibula plate and interfragmentary lag screw. There does not appear to be any broken hardware. There is an area of fracture with callus at the proximal aspect of the plate.  Local Wound Care;  DSD   Weight Bearing Status; NWB LLE   Patient was seen and treatment options were discussed. Patient will continue to be followed by Ortho team at this time and no podiatry intervention is required  Patient can f/u as an outpatient with Dr Nathan if she chooses for wound care management  Discussed Plan w/ Dr Nathan     Podiatry

## 2023-08-17 NOTE — H&P ADULT - ASSESSMENT
The patient is a 57 year old female PMH HTN, Asthma, 40 pack year smoker presents with pain in left ankle.     #Possible OM of left ankle  -Patient underwent ORIF of a L ankle Fx in Aug 2022 at [facility], after which, she reports she has had a chronic draining sinus tract with a small wound at the lateral ankle that never healed.    -She follows with podiatrist Dr. Montoya at [facility]. She recently obtained an XR which demonstrated a fibular fracture at the proximal aspect of the distal fibula plate  -She recently saw Dr. Montoya who advised her to go to the hospital due to the concern that her hardware was infected.   -s/p 1 dose cefepime in ED  -c/w cefepime and start vanc  -X-ray foot and ankle performed, follow up read  -ID consult  -podiatry consult  -ortho follow up  -ortho saw patient ans recommended: NWB LLE, Trend ESR/CRP/WBC, Plan pending discussion with patient's podiatrist Dr. Montoya and with [facility] attending F&A surgeon    #Hyponatremia  -patient has history of hyponatremia and was previously hospitalized  -obtain urine Na and osm, serum osm    #HTN  -c/w home meds    DVT proph: lovenox subq

## 2023-08-17 NOTE — H&P ADULT - HISTORY OF PRESENT ILLNESS
The patient is a 57 year old female PMH HTN, Asthma, 40 pack year smoker presents with pain in left ankle. She underwent ORIF of a L ankle Fx in Aug 2022 at [facility], after which, she reports she has had a chronic draining sinus tract with a small wound at the lateral ankle that never healed. She reports that it drains serous fluid. She says that her ankle has been continuously painful since surgery. She follows with podiatrist Dr. Montoya at [facility]. She recently obtained an XR which demonstrated a fibular fracture at the proximal aspect of the distal fibula plate, which apparently had not been present on earlier imaging. She recently saw Dr. Montoya who advised her to go to the hospital due to the concern that her hardware was infected.  She denies fever, chills, n/v, chest pain, sob, she only complains of left ankle pain.    In ED vitals significant for   Labs: WBC 14.33, , Na 130, CRP 64  X-ray foot and ankle performed  Patient was given cefepime in ED

## 2023-08-17 NOTE — H&P ADULT - NSHPPHYSICALEXAM_GEN_ALL_CORE
PHYSICAL EXAM:  GENERAL: NAD, lying comfortably in bed  HEAD:  Atraumatic, Normocephalic  EYES: EOMI, PERRLA, conjunctiva and sclera clear  ENMT: No tonsillar erythema, exudates, or enlargement; Moist mucous membranes  NECK: Supple, No JVD, Normal thyroid  HEART: Regular rate and rhythm; No murmurs, rubs, or gallops  RESPIRATORY: CTA B/L, No W/R/R  ABDOMEN: Soft, Nontender, Nondistended; Bowel sounds present  NEUROLOGY: A&Ox3, nonfocal, moving all extremities  EXTREMITIES: left ankle has small sinus tract, tender on palpation

## 2023-08-18 ENCOUNTER — TRANSCRIPTION ENCOUNTER (OUTPATIENT)
Age: 58
End: 2023-08-18

## 2023-08-18 VITALS
DIASTOLIC BLOOD PRESSURE: 61 MMHG | HEART RATE: 74 BPM | SYSTOLIC BLOOD PRESSURE: 109 MMHG | RESPIRATION RATE: 18 BRPM | TEMPERATURE: 97 F

## 2023-08-18 LAB
ALBUMIN SERPL ELPH-MCNC: 3.8 G/DL — SIGNIFICANT CHANGE UP (ref 3.5–5.2)
ALP SERPL-CCNC: 115 U/L — SIGNIFICANT CHANGE UP (ref 30–115)
ALT FLD-CCNC: 11 U/L — SIGNIFICANT CHANGE UP (ref 0–41)
ANION GAP SERPL CALC-SCNC: 16 MMOL/L — HIGH (ref 7–14)
AST SERPL-CCNC: 14 U/L — SIGNIFICANT CHANGE UP (ref 0–41)
BASOPHILS # BLD AUTO: 0.08 K/UL — SIGNIFICANT CHANGE UP (ref 0–0.2)
BASOPHILS NFR BLD AUTO: 0.7 % — SIGNIFICANT CHANGE UP (ref 0–1)
BILIRUB SERPL-MCNC: <0.2 MG/DL — SIGNIFICANT CHANGE UP (ref 0.2–1.2)
BUN SERPL-MCNC: 17 MG/DL — SIGNIFICANT CHANGE UP (ref 10–20)
CALCIUM SERPL-MCNC: 9.7 MG/DL — SIGNIFICANT CHANGE UP (ref 8.4–10.5)
CHLORIDE SERPL-SCNC: 101 MMOL/L — SIGNIFICANT CHANGE UP (ref 98–110)
CO2 SERPL-SCNC: 18 MMOL/L — SIGNIFICANT CHANGE UP (ref 17–32)
CREAT SERPL-MCNC: 0.7 MG/DL — SIGNIFICANT CHANGE UP (ref 0.7–1.5)
CRP SERPL-MCNC: 49.2 MG/L — HIGH
EGFR: 101 ML/MIN/1.73M2 — SIGNIFICANT CHANGE UP
EOSINOPHIL # BLD AUTO: 0.25 K/UL — SIGNIFICANT CHANGE UP (ref 0–0.7)
EOSINOPHIL NFR BLD AUTO: 2.1 % — SIGNIFICANT CHANGE UP (ref 0–8)
ERYTHROCYTE [SEDIMENTATION RATE] IN BLOOD: 124 MM/HR — HIGH (ref 0–20)
GLUCOSE SERPL-MCNC: 104 MG/DL — HIGH (ref 70–99)
HCT VFR BLD CALC: 34.5 % — LOW (ref 37–47)
HGB BLD-MCNC: 11.7 G/DL — LOW (ref 12–16)
IMM GRANULOCYTES NFR BLD AUTO: 0.8 % — HIGH (ref 0.1–0.3)
LYMPHOCYTES # BLD AUTO: 19 % — LOW (ref 20.5–51.1)
LYMPHOCYTES # BLD AUTO: 2.26 K/UL — SIGNIFICANT CHANGE UP (ref 1.2–3.4)
MAGNESIUM SERPL-MCNC: 2 MG/DL — SIGNIFICANT CHANGE UP (ref 1.8–2.4)
MCHC RBC-ENTMCNC: 31.8 PG — HIGH (ref 27–31)
MCHC RBC-ENTMCNC: 33.9 G/DL — SIGNIFICANT CHANGE UP (ref 32–37)
MCV RBC AUTO: 93.8 FL — SIGNIFICANT CHANGE UP (ref 81–99)
MONOCYTES # BLD AUTO: 1.05 K/UL — HIGH (ref 0.1–0.6)
MONOCYTES NFR BLD AUTO: 8.8 % — SIGNIFICANT CHANGE UP (ref 1.7–9.3)
NEUTROPHILS # BLD AUTO: 8.16 K/UL — HIGH (ref 1.4–6.5)
NEUTROPHILS NFR BLD AUTO: 68.6 % — SIGNIFICANT CHANGE UP (ref 42.2–75.2)
NRBC # BLD: 0 /100 WBCS — SIGNIFICANT CHANGE UP (ref 0–0)
PLATELET # BLD AUTO: 463 K/UL — HIGH (ref 130–400)
PMV BLD: 9.3 FL — SIGNIFICANT CHANGE UP (ref 7.4–10.4)
POTASSIUM SERPL-MCNC: 4.5 MMOL/L — SIGNIFICANT CHANGE UP (ref 3.5–5)
POTASSIUM SERPL-SCNC: 4.5 MMOL/L — SIGNIFICANT CHANGE UP (ref 3.5–5)
PROT SERPL-MCNC: 6.9 G/DL — SIGNIFICANT CHANGE UP (ref 6–8)
RBC # BLD: 3.68 M/UL — LOW (ref 4.2–5.4)
RBC # FLD: 12.8 % — SIGNIFICANT CHANGE UP (ref 11.5–14.5)
SODIUM SERPL-SCNC: 135 MMOL/L — SIGNIFICANT CHANGE UP (ref 135–146)
WBC # BLD: 11.9 K/UL — HIGH (ref 4.8–10.8)
WBC # FLD AUTO: 11.9 K/UL — HIGH (ref 4.8–10.8)

## 2023-08-18 PROCEDURE — 99239 HOSP IP/OBS DSCHRG MGMT >30: CPT

## 2023-08-18 RX ADMIN — ENOXAPARIN SODIUM 40 MILLIGRAM(S): 100 INJECTION SUBCUTANEOUS at 05:49

## 2023-08-18 RX ADMIN — AMLODIPINE BESYLATE 2.5 MILLIGRAM(S): 2.5 TABLET ORAL at 05:49

## 2023-08-18 NOTE — DISCHARGE NOTE NURSING/CASE MANAGEMENT/SOCIAL WORK - NSDCPEFALRISK_GEN_ALL_CORE
For information on Fall & Injury Prevention, visit: [email or web address] OR  [email or web address] OR  https://www.cdc.gov/steadi/patient.html

## 2023-08-18 NOTE — DISCHARGE NOTE PROVIDER - NSDCCPCAREPLAN_GEN_ALL_CORE_FT
PRINCIPAL DISCHARGE DIAGNOSIS  Diagnosis: Chronic osteomyelitis  Assessment and Plan of Treatment: you have chronic infection of your left ankle bone at site of the hardware. Please follow up with orthopedics or your podiatrist for discussion of surgery and further treatment. No need for antibiotics at the moment, you will need outpatient antibiotics after the surgery.  Please come to the emergency department if you developed any fever, altered mental status, increasing pain at the ankle.

## 2023-08-18 NOTE — DISCHARGE NOTE PROVIDER - HOSPITAL COURSE
The patient is a 57 year old female PMH HTN, Asthma, 40 pack year smoker presents with pain in left ankle. She underwent ORIF of a L ankle Fx in Aug 2022 at [facility], after which, she reports she has had a chronic draining sinus tract with a small wound at the lateral ankle that never healed. She reports that it drains serous fluid. She says that her ankle has been continuously painful since surgery. She follows with podiatrist Dr. Montoya at [facility]. She recently obtained an XR which demonstrated a fibular fracture at the proximal aspect of the distal fibula plate, which apparently had not been present on earlier imaging. She recently saw Dr. Montoya who advised her to go to the hospital due to the concern that her hardware was infected.  She denies fever, chills, n/v, chest pain, sob, she only complains of left ankle pain.    In ED vitals significant for   Labs: WBC 14.33, , Na 130, CRP 64  X-ray foot and ankle performed  Patient was given cefepime in ED    She was evaluated for chronic osteomyelitis L ankle hardware by ID, podiatry and orthopedics. ID said no empiric antibiotics, deep cultures and biopsy is needed. Orthopedics offered the patient to take over her case and to manage it outpatient as she doesn't want to go back to [facility].   Patient will likely require hardware removal and may schedule this on an outpatient basis. She may follow up with her podiatrist Dr. Montoya or with our foot and ankle surgeon Dr. Simmons.

## 2023-08-18 NOTE — PROGRESS NOTE ADULT - SUBJECTIVE AND OBJECTIVE BOX
57F with likely chronic infected L ankle hardware s/p ORIF at [facility], [place] in Aug 2022.    Patient remains afebrile and hemodynamically stable.    Discussed with patient that she may be weight bearing as tolerated with a walker as there is no acute fracture.    Patient will likely require hardware removal and may schedule this on an outpatient basis. She may follow up with her podiatrist Dr. Montoya or with our foot and ankle surgeon Dr. Simmons.    Upon discharge, please provide patient with information to schedule a follow up visit with Dr. Jayden Simmons [address] [facility]  [phone].
LUZ MARINA WISE 57y Female  MRN#: [id number]   Hospital Day: 1d    SUBJECTIVE  The patient is a 57 year old female PMH HTN, Asthma, 40 pack year smoker presents with pain in left ankle. She underwent ORIF of a L ankle Fx in Aug 2022 at [facility], after which, she reports she has had a chronic draining sinus tract with a small wound at the lateral ankle that never healed. She reports that it drains serous fluid. She says that her ankle has been continuously painful since surgery. She follows with podiatrist Dr. Montoya at [facility]. She recently obtained an XR which demonstrated a fibular fracture at the proximal aspect of the distal fibula plate, which apparently had not been present on earlier imaging. She recently saw Dr. Montoya who advised her to go to the hospital due to the concern that her hardware was infected.  She denies fever, chills, n/v, chest pain, sob, she only complains of left ankle pain.    In ED vitals significant for   Labs: WBC 14.33, , Na 130, CRP 64  X-ray foot and ankle performed  Patient was given cefepime in ED    Patient is a 57y old Female who presents with a chief complaint of Currently admitted to medicine with the primary diagnosis of Osteomyelitis of left ankle      INTERVAL HPI AND OVERNIGHT EVENTS:  Patient was examined and seen at bedside. This morning she is resting comfortably in bed and reports no issues or overnight events.    OBJECTIVE  PAST MEDICAL & SURGICAL HISTORY  HTN (hypertension)    COPD (chronic obstructive pulmonary disease)      ALLERGIES:  penicillins (Unknown)    MEDICATIONS:  STANDING MEDICATIONS  amLODIPine   Tablet 2.5 milliGRAM(s) Oral daily  enoxaparin Injectable 40 milliGRAM(s) SubCutaneous every 24 hours    PRN MEDICATIONS  acetaminophen     Tablet .. 650 milliGRAM(s) Oral every 6 hours PRN      VITAL SIGNS: Last 24 Hours  T(C): 36.5 (17 Aug 2023 12:47), Max: 36.8 (16 Aug 2023 18:40)  T(F): 97.7 (17 Aug 2023 12:47), Max: 98.3 (16 Aug 2023 18:40)  HR: 62 (17 Aug 2023 12:47) (62 - 110)  BP: 140/67 (17 Aug 2023 12:47) (109/64 - 140/67)  BP(mean): --  RR: 18 (17 Aug 2023 12:47) (18 - 18)  SpO2: 100% (17 Aug 2023 00:00) (100% - 100%)    LABS:                        11.0   11.95 )-----------( 402      ( 17 Aug 2023 07:36 )             31.5     08-17    130<L>  |  101  |  18  ----------------------------<  121<H>  4.3   |  17  |  0.7    Ca    9.3      17 Aug 2023 07:36  Mg     1.9     08-17    TPro  6.5  /  Alb  3.5  /  TBili  0.2  /  DBili  x   /  AST  11  /  ALT  8   /  AlkPhos  103  08-17      Urinalysis Basic - ( 17 Aug 2023 07:36 )    Color: x / Appearance: x / SG: x / pH: x  Gluc: 121 mg/dL / Ketone: x  / Bili: x / Urobili: x   Blood: x / Protein: x / Nitrite: x   Leuk Esterase: x / RBC: x / WBC x   Sq Epi: x / Non Sq Epi: x / Bacteria: x        Sedimentation Rate, Erythrocyte: 88 mm/Hr *H* (08-17-23 @ 07:36)  Lactate, Blood: 1.2 mmol/L (08-16-23 @ 21:07)  Sedimentation Rate, Erythrocyte: 108 mm/Hr *H* (08-16-23 @ 21:07)          RADIOLOGY:  < from: Xray Foot AP + Lateral + Oblique, Left (08.16.23 @ 20:35) >  IMPRESSION:    Generalized osteopenia. No evidence for acute fracture or dislocation of   the left foot. Degenerative midfoot arthritic changes.    Distal fibular hardware with superior aspect lucency and angulated   fracture deformity.    --- End of Report ---    < end of copied text >      PHYSICAL EXAM:  CONSTITUTIONAL: No acute distress, AAOx3, malodorous  HEAD: Atraumatic, normocephalic  EYES: EOM intact, PERRLA, conjunctiva and sclera clear  ENT: moist mucous membranes  PULMONARY: Clear to auscultation bilaterally  CARDIOVASCULAR: Regular rate and rhythm  GASTROINTESTINAL: Soft, non-tender, non-distended; bowel sounds present  MUSCULOSKELETAL: no edema  NEUROLOGY: non-focal  SKIN: warm and dry    
  LUZ MARINA WISE  57y, Female    All available historical data reviewed    OVERNIGHT EVENTS:    no fevers   ROS:  General: Denies rigors, nightsweats  HEENT: Denies headache, rhinorrhea, sore throat, eye pain  CV: Denies CP, palpitations  PULM: Denies wheezing, hemoptysis  GI: Denies hematemesis, hematochezia, melena  : Denies discharge, hematuria  MSK: Denies arthralgias, myalgias  SKIN: Denies rash, lesions  NEURO: Denies paresthesias, weakness  PSYCH: Denies depression, anxiety    VITALS:  T(F): 97.3, Max: 97.7 (08-17-23 @ 12:47)  HR: 74  BP: 109/61  RR: 18Vital Signs Last 24 Hrs  T(C): 36.3 (18 Aug 2023 05:32), Max: 36.5 (17 Aug 2023 12:47)  T(F): 97.3 (18 Aug 2023 05:32), Max: 97.7 (17 Aug 2023 12:47)  HR: 74 (18 Aug 2023 05:32) (62 - 82)  BP: 109/61 (18 Aug 2023 05:32) (109/61 - 140/67)  BP(mean): --  RR: 18 (18 Aug 2023 05:32) (18 - 18)  SpO2: --        TESTS & MEASUREMENTS:                        11.7   11.90 )-----------( 463      ( 18 Aug 2023 06:10 )             34.5     08-18    135  |  101  |  17  ----------------------------<  104<H>  4.5   |  18  |  0.7    Ca    9.7      18 Aug 2023 06:10  Mg     2.0     08-18    TPro  6.9  /  Alb  3.8  /  TBili  <0.2  /  DBili  x   /  AST  14  /  ALT  11  /  AlkPhos  115  08-18    LIVER FUNCTIONS - ( 18 Aug 2023 06:10 )  Alb: 3.8 g/dL / Pro: 6.9 g/dL / ALK PHOS: 115 U/L / ALT: 11 U/L / AST: 14 U/L / GGT: x             Culture - Blood (collected 08-16-23 @ 21:07)  Source: .Blood Blood  Preliminary Report (08-18-23 @ 04:02):    No growth at 24 hours    Culture - Blood (collected 08-16-23 @ 21:07)  Source: .Blood Blood  Preliminary Report (08-18-23 @ 04:02):    No growth at 24 hours      Urinalysis Basic - ( 18 Aug 2023 06:10 )    Color: x / Appearance: x / SG: x / pH: x  Gluc: 104 mg/dL / Ketone: x  / Bili: x / Urobili: x   Blood: x / Protein: x / Nitrite: x   Leuk Esterase: x / RBC: x / WBC x   Sq Epi: x / Non Sq Epi: x / Bacteria: x          RADIOLOGY & ADDITIONAL TESTS:  Personal review of radiological diagnostics performed  Echo and EKG results noted when applicable.     MEDICATIONS:  acetaminophen     Tablet .. 650 milliGRAM(s) Oral every 6 hours PRN  amLODIPine   Tablet 2.5 milliGRAM(s) Oral daily  enoxaparin Injectable 40 milliGRAM(s) SubCutaneous every 24 hours      ANTIBIOTICS:

## 2023-08-18 NOTE — DISCHARGE NOTE PROVIDER - CARE PROVIDER_API CALL
Jayden Simmons  Orthopaedic Surgery  [address] [facility] [place] [address]  Phone: [phone]  Fax: [phone]  Follow Up Time: 1-3 days

## 2023-08-18 NOTE — DISCHARGE NOTE NURSING/CASE MANAGEMENT/SOCIAL WORK - NSDCFUADDAPPT_GEN_ALL_CORE_FT
Upon discharge, please provide patient with information to schedule a follow up visit with Dr. Jayden Simmons [address] [facility]  [phone].

## 2023-08-18 NOTE — PROGRESS NOTE ADULT - ASSESSMENT
Jake PGY3: 30M no pmh recent dx of 5mm renal stone presents with a cc of L flank pain a/w radiation up and down L flank c/w prior episode and recent dx of stone, no new urinary sx, hematuria or painful urination reports was taking oxycodone as instructed however pain worsening prompting ED visit, no prior episodes of stone or uro procedures, has not yet seen urologist. exam vss non toxic L cva ttp c/f stone not progressing will check labs ua renal us eval for hydro, pain control, reassess · Assessment	  57 year old female PMH HTN, Asthma, 40 pack year smoker presents with pain in left ankle. She underwent ORIF of a L ankle Fx in Aug 2022 at [facility], after which, she reports she has had a chronic draining sinus tract with a small wound at the lateral ankle that never healed. She reports that it drains serous fluid. She says that her ankle has been continuously painful since surgery. She follows with podiatrist Dr. Montoya at [facility]. She recently obtained an XR which demonstrated a fibular fracture at the proximal aspect of the distal fibula plate, which apparently had not been present on earlier imaging. She recently saw Dr. Montoya who advised her to go to the hospital due to the concern that her hardware was infected.  She denies fever, chills, n/v, chest pain, sob, she only complains of left ankle pain.    IMPRESSION/RECOMMENDATIONS  Chronic OM ( draining sinus since >6 mths ) left lateral malleolus with possible infected hardware.  ESR//64.1  WBC 11.9  816 BCx NG    -ABX alone ( especially given empirically ) will not achieve a cure  -evaluation by Dr Montoya for deep tissue cultures and possible revision and subsequently long term ABx directed against isolated org   -no empiric ABx    Please do not hesitate to recall ID if any questions arise either through [employer] or through [employer]

## 2023-08-18 NOTE — DISCHARGE NOTE PROVIDER - NSDCFUADDAPPT_GEN_ALL_CORE_FT
Upon discharge, please provide patient with information to schedule a follow up visit with Dr. Jayden Simmons [address] [facility]  [phone].

## 2023-08-18 NOTE — DISCHARGE NOTE NURSING/CASE MANAGEMENT/SOCIAL WORK - PATIENT PORTAL LINK FT
You can access the FollowMyHealth Patient Portal offered by [facility] by registering at the following website: [email or web address]. By joining [employer]’s FollowMyHealth portal, you will also be able to view your health information using other applications (apps) compatible with our system.

## 2023-08-18 NOTE — DISCHARGE NOTE PROVIDER - ATTENDING DISCHARGE PHYSICAL EXAMINATION:
Vital Signs Last 24 Hrs  T(C): 36.3 (18 Aug 2023 05:32), Max: 36.3 (17 Aug 2023 21:38)  T(F): 97.3 (18 Aug 2023 05:32), Max: 97.4 (17 Aug 2023 21:38)  HR: 74 (18 Aug 2023 05:32) (74 - 82)  BP: 109/61 (18 Aug 2023 05:32) (109/61 - 112/60)  BP(mean): --  RR: 18 (18 Aug 2023 05:32) (18 - 18)  SpO2: --    PHYSICAL EXAM:  GENERAL: Not in distress - speaking in full sentences   CHEST/LUNG: Clear air entry   HEART: Regular rate and rhythm  ABDOMEN: Soft abdomen   EXTREMITIES:  L ankle dressing     -pt seen this am   -stable for home discharge  -monitor Off abx as per ID - see note  -Ortho team spoke to patient at bedside this am - answered her questions and addressed her concerns - she was made aware that there is not inpatient surgical procedure and or biopsies and that patient has to follow up with ortho on the outside   -I had teach back with the patient - she will follow up with PMD and ortho

## 2023-08-22 DIAGNOSIS — Z96.662 PRESENCE OF LEFT ARTIFICIAL ANKLE JOINT: ICD-10-CM

## 2023-08-22 DIAGNOSIS — J45.909 UNSPECIFIED ASTHMA, UNCOMPLICATED: ICD-10-CM

## 2023-08-22 DIAGNOSIS — E87.1 HYPO-OSMOLALITY AND HYPONATREMIA: ICD-10-CM
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
## 2023-08-22 DIAGNOSIS — F17.210 NICOTINE DEPENDENCE, CIGARETTES, UNCOMPLICATED: ICD-10-CM
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
## 2023-08-22 DIAGNOSIS — Z88.0 ALLERGY STATUS TO PENICILLIN: ICD-10-CM

## 2023-08-22 DIAGNOSIS — Z20.822 CONTACT WITH AND (SUSPECTED) EXPOSURE TO COVID-19: ICD-10-CM
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
## 2023-08-22 DIAGNOSIS — I10 ESSENTIAL (PRIMARY) HYPERTENSION: ICD-10-CM

## 2023-08-22 DIAGNOSIS — T84.59XA INFECTION AND INFLAMMATORY REACTION DUE TO OTHER INTERNAL JOINT PROSTHESIS, INITIAL ENCOUNTER: ICD-10-CM

## 2023-08-22 DIAGNOSIS — J44.9 CHRONIC OBSTRUCTIVE PULMONARY DISEASE, UNSPECIFIED: ICD-10-CM

## 2023-08-22 DIAGNOSIS — A41.9 SEPSIS, UNSPECIFIED ORGANISM: ICD-10-CM

## 2023-08-22 DIAGNOSIS — M86.672 OTHER CHRONIC OSTEOMYELITIS, LEFT ANKLE AND FOOT: ICD-10-CM

## 2023-08-22 DIAGNOSIS — R64 CACHEXIA: ICD-10-CM

## 2023-08-22 LAB
CULTURE RESULTS: SIGNIFICANT CHANGE UP
CULTURE RESULTS: SIGNIFICANT CHANGE UP
SPECIMEN SOURCE: SIGNIFICANT CHANGE UP
SPECIMEN SOURCE: SIGNIFICANT CHANGE UP

## 2023-08-28 ENCOUNTER — APPOINTMENT (OUTPATIENT)
Dept: ORTHOPEDIC SURGERY | Facility: CLINIC | Age: 58
End: 2023-08-28

## 2023-08-28 VITALS — HEIGHT: 59 IN

## 2023-09-01 ENCOUNTER — APPOINTMENT (OUTPATIENT)
Dept: ORTHOPEDIC SURGERY | Facility: CLINIC | Age: 58
End: 2023-09-01
Payer: MEDICAID

## 2023-09-01 PROCEDURE — 99204 OFFICE O/P NEW MOD 45 MIN: CPT

## 2023-09-01 PROCEDURE — 73610 X-RAY EXAM OF ANKLE: CPT | Mod: LT

## 2023-09-01 NOTE — DATA REVIEWED
[FreeTextEntry1] : 3 views of the patient's left ankle ordered and reviewed by me personally.  The x-rays demonstrate evidence of a lateral malleolus hardware present.  There is appears to be a fracture site above the plate.  No evidence of any new fracture otherwise.

## 2023-09-01 NOTE — DISCUSSION/SUMMARY
[de-identified] : I discussed the patient's findings with her.  I believe that the draining sinus is stemming from a surgical site infection involving the lateral malleolus hardware.  In order to treat this draining sinus it would have to be treated with a irrigation debridement along with mobile hardware and placement of antibiotic beads.  This would likely require also a inpatient stay with 6 weeks of antibiotics which can be done as an outpatient utilizing a PICC line.  We discussed the risks of surgery which include but are not limited to further infection, persistent pain, refracture, DVT, PE, loss of limb, loss of life.  The patient wished to proceed forward surgery.  All questions answered.

## 2023-09-01 NOTE — PHYSICAL EXAM
[de-identified] : She is alert oriented x3.  Pleasant cooperative.  I examined her left lower extremity.  There is a otherwise well-healed incision involving the lateral malleolus.  There is a small area great which is a draining sinus warmer which there is slightly purulent dressing.  No surrounding skin erythema.  No florid infection.  She is neurovascular intact distally.

## 2023-09-01 NOTE — HISTORY OF PRESENT ILLNESS
[de-identified] : This is a 57-year-old patient coming to see me the first time in regards to a left ankle issue.  She had a left ankle open reduction internal fixation performed back in August 2022 a year ago.  Her postoperative course was complicated by draining sinus which is still draining.  She has had no operative intervention since her initial surgery.  She complains of pain over the affected area.  She was seen at the hospital where she was subsequently referred to me for outpatient follow-up.  Currently she is changing her dressings herself.  She does have a 40-year smoking history

## 2023-09-07 ENCOUNTER — INPATIENT (INPATIENT)
Facility: HOSPITAL | Age: 58
LOS: 11 days | Discharge: HOME HEALTH | DRG: 313 | End: 2023-09-19
Attending: HOSPITALIST | Admitting: STUDENT IN AN ORGANIZED HEALTH CARE EDUCATION/TRAINING PROGRAM
Payer: MEDICAID

## 2023-09-07 VITALS
HEIGHT: 59 IN | SYSTOLIC BLOOD PRESSURE: 100 MMHG | RESPIRATION RATE: 973 BRPM | HEART RATE: 127 BPM | TEMPERATURE: 97 F | DIASTOLIC BLOOD PRESSURE: 65 MMHG | OXYGEN SATURATION: 98 %

## 2023-09-07 DIAGNOSIS — N17.9 ACUTE KIDNEY FAILURE, UNSPECIFIED: ICD-10-CM

## 2023-09-07 LAB
ALBUMIN SERPL ELPH-MCNC: 4.5 G/DL — SIGNIFICANT CHANGE UP (ref 3.5–5.2)
ALP SERPL-CCNC: 116 U/L — HIGH (ref 30–115)
ALT FLD-CCNC: 11 U/L — SIGNIFICANT CHANGE UP (ref 0–41)
ANION GAP SERPL CALC-SCNC: 17 MMOL/L — HIGH (ref 7–14)
ANION GAP SERPL CALC-SCNC: 22 MMOL/L — HIGH (ref 7–14)
APPEARANCE UR: ABNORMAL
AST SERPL-CCNC: 17 U/L — SIGNIFICANT CHANGE UP (ref 0–41)
BASOPHILS # BLD AUTO: 0.09 K/UL — SIGNIFICANT CHANGE UP (ref 0–0.2)
BASOPHILS NFR BLD AUTO: 0.4 % — SIGNIFICANT CHANGE UP (ref 0–1)
BILIRUB SERPL-MCNC: 0.3 MG/DL — SIGNIFICANT CHANGE UP (ref 0.2–1.2)
BILIRUB UR-MCNC: NEGATIVE — SIGNIFICANT CHANGE UP
BUN SERPL-MCNC: 55 MG/DL — HIGH (ref 10–20)
BUN SERPL-MCNC: 64 MG/DL — CRITICAL HIGH (ref 10–20)
CALCIUM SERPL-MCNC: 10.7 MG/DL — HIGH (ref 8.4–10.5)
CALCIUM SERPL-MCNC: 9.5 MG/DL — SIGNIFICANT CHANGE UP (ref 8.4–10.4)
CHLORIDE SERPL-SCNC: 95 MMOL/L — LOW (ref 98–110)
CHLORIDE SERPL-SCNC: 95 MMOL/L — LOW (ref 98–110)
CO2 SERPL-SCNC: 13 MMOL/L — LOW (ref 17–32)
CO2 SERPL-SCNC: 14 MMOL/L — LOW (ref 17–32)
COLOR SPEC: YELLOW — SIGNIFICANT CHANGE UP
CREAT SERPL-MCNC: 2.8 MG/DL — HIGH (ref 0.7–1.5)
CREAT SERPL-MCNC: 3.9 MG/DL — HIGH (ref 0.7–1.5)
DIFF PNL FLD: ABNORMAL
EGFR: 13 ML/MIN/1.73M2 — LOW
EGFR: 19 ML/MIN/1.73M2 — LOW
EOSINOPHIL # BLD AUTO: 0.02 K/UL — SIGNIFICANT CHANGE UP (ref 0–0.7)
EOSINOPHIL NFR BLD AUTO: 0.1 % — SIGNIFICANT CHANGE UP (ref 0–8)
GLUCOSE BLDC GLUCOMTR-MCNC: 117 MG/DL — HIGH (ref 70–99)
GLUCOSE BLDC GLUCOMTR-MCNC: 121 MG/DL — HIGH (ref 70–99)
GLUCOSE BLDC GLUCOMTR-MCNC: 135 MG/DL — HIGH (ref 70–99)
GLUCOSE BLDC GLUCOMTR-MCNC: 204 MG/DL — HIGH (ref 70–99)
GLUCOSE BLDC GLUCOMTR-MCNC: 43 MG/DL — CRITICAL LOW (ref 70–99)
GLUCOSE SERPL-MCNC: 108 MG/DL — HIGH (ref 70–99)
GLUCOSE SERPL-MCNC: 121 MG/DL — HIGH (ref 70–99)
GLUCOSE UR QL: NEGATIVE MG/DL — SIGNIFICANT CHANGE UP
HCT VFR BLD CALC: 37.9 % — SIGNIFICANT CHANGE UP (ref 37–47)
HGB BLD-MCNC: 13.2 G/DL — SIGNIFICANT CHANGE UP (ref 12–16)
IMM GRANULOCYTES NFR BLD AUTO: 0.6 % — HIGH (ref 0.1–0.3)
KETONES UR-MCNC: NEGATIVE MG/DL — SIGNIFICANT CHANGE UP
LACTATE SERPL-SCNC: 1.6 MMOL/L — SIGNIFICANT CHANGE UP (ref 0.7–2)
LEUKOCYTE ESTERASE UR-ACNC: ABNORMAL
LYMPHOCYTES # BLD AUTO: 1.79 K/UL — SIGNIFICANT CHANGE UP (ref 1.2–3.4)
LYMPHOCYTES # BLD AUTO: 8.2 % — LOW (ref 20.5–51.1)
MAGNESIUM SERPL-MCNC: 2.5 MG/DL — HIGH (ref 1.8–2.4)
MCHC RBC-ENTMCNC: 32.4 PG — HIGH (ref 27–31)
MCHC RBC-ENTMCNC: 34.8 G/DL — SIGNIFICANT CHANGE UP (ref 32–37)
MCV RBC AUTO: 93.1 FL — SIGNIFICANT CHANGE UP (ref 81–99)
MONOCYTES # BLD AUTO: 1.21 K/UL — HIGH (ref 0.1–0.6)
MONOCYTES NFR BLD AUTO: 5.5 % — SIGNIFICANT CHANGE UP (ref 1.7–9.3)
NEUTROPHILS # BLD AUTO: 18.63 K/UL — HIGH (ref 1.4–6.5)
NEUTROPHILS NFR BLD AUTO: 85.2 % — HIGH (ref 42.2–75.2)
NITRITE UR-MCNC: NEGATIVE — SIGNIFICANT CHANGE UP
NRBC # BLD: 0 /100 WBCS — SIGNIFICANT CHANGE UP (ref 0–0)
PH UR: 6.5 — SIGNIFICANT CHANGE UP (ref 5–8)
PLATELET # BLD AUTO: 576 K/UL — HIGH (ref 130–400)
PMV BLD: 10.5 FL — HIGH (ref 7.4–10.4)
POTASSIUM SERPL-MCNC: 5 MMOL/L — SIGNIFICANT CHANGE UP (ref 3.5–5)
POTASSIUM SERPL-MCNC: 5.9 MMOL/L — HIGH (ref 3.5–5)
POTASSIUM SERPL-SCNC: 5 MMOL/L — SIGNIFICANT CHANGE UP (ref 3.5–5)
POTASSIUM SERPL-SCNC: 5.9 MMOL/L — HIGH (ref 3.5–5)
PROT SERPL-MCNC: 8.6 G/DL — HIGH (ref 6–8)
PROT UR-MCNC: 300 MG/DL
RBC # BLD: 4.07 M/UL — LOW (ref 4.2–5.4)
RBC # FLD: 13.4 % — SIGNIFICANT CHANGE UP (ref 11.5–14.5)
SODIUM SERPL-SCNC: 126 MMOL/L — LOW (ref 135–146)
SODIUM SERPL-SCNC: 130 MMOL/L — LOW (ref 135–146)
SP GR SPEC: 1.01 — SIGNIFICANT CHANGE UP (ref 1–1.03)
UROBILINOGEN FLD QL: 1 MG/DL — SIGNIFICANT CHANGE UP (ref 0.2–1)
WBC # BLD: 21.88 K/UL — HIGH (ref 4.8–10.8)
WBC # FLD AUTO: 21.88 K/UL — HIGH (ref 4.8–10.8)

## 2023-09-07 PROCEDURE — 87635 SARS-COV-2 COVID-19 AMP PRB: CPT

## 2023-09-07 PROCEDURE — C1751: CPT

## 2023-09-07 PROCEDURE — 80053 COMPREHEN METABOLIC PANEL: CPT

## 2023-09-07 PROCEDURE — 70491 CT SOFT TISSUE NECK W/DYE: CPT | Mod: 26,MA

## 2023-09-07 PROCEDURE — 85027 COMPLETE CBC AUTOMATED: CPT

## 2023-09-07 PROCEDURE — 86850 RBC ANTIBODY SCREEN: CPT

## 2023-09-07 PROCEDURE — 87040 BLOOD CULTURE FOR BACTERIA: CPT

## 2023-09-07 PROCEDURE — 97110 THERAPEUTIC EXERCISES: CPT | Mod: GP

## 2023-09-07 PROCEDURE — 36573 INSJ PICC RS&I 5 YR+: CPT

## 2023-09-07 PROCEDURE — 87075 CULTR BACTERIA EXCEPT BLOOD: CPT

## 2023-09-07 PROCEDURE — 85610 PROTHROMBIN TIME: CPT

## 2023-09-07 PROCEDURE — 87015 SPECIMEN INFECT AGNT CONCNTJ: CPT

## 2023-09-07 PROCEDURE — 87102 FUNGUS ISOLATION CULTURE: CPT

## 2023-09-07 PROCEDURE — 84702 CHORIONIC GONADOTROPIN TEST: CPT

## 2023-09-07 PROCEDURE — 36415 COLL VENOUS BLD VENIPUNCTURE: CPT

## 2023-09-07 PROCEDURE — 73610 X-RAY EXAM OF ANKLE: CPT | Mod: LT

## 2023-09-07 PROCEDURE — 97162 PT EVAL MOD COMPLEX 30 MIN: CPT | Mod: GP

## 2023-09-07 PROCEDURE — 82330 ASSAY OF CALCIUM: CPT

## 2023-09-07 PROCEDURE — 87206 SMEAR FLUORESCENT/ACID STAI: CPT

## 2023-09-07 PROCEDURE — 71045 X-RAY EXAM CHEST 1 VIEW: CPT

## 2023-09-07 PROCEDURE — 83605 ASSAY OF LACTIC ACID: CPT

## 2023-09-07 PROCEDURE — 87641 MR-STAPH DNA AMP PROBE: CPT

## 2023-09-07 PROCEDURE — 87077 CULTURE AEROBIC IDENTIFY: CPT

## 2023-09-07 PROCEDURE — 82962 GLUCOSE BLOOD TEST: CPT

## 2023-09-07 PROCEDURE — 83930 ASSAY OF BLOOD OSMOLALITY: CPT

## 2023-09-07 PROCEDURE — 87070 CULTURE OTHR SPECIMN AEROBIC: CPT

## 2023-09-07 PROCEDURE — 84100 ASSAY OF PHOSPHORUS: CPT

## 2023-09-07 PROCEDURE — 86900 BLOOD TYPING SEROLOGIC ABO: CPT

## 2023-09-07 PROCEDURE — 87186 SC STD MICRODIL/AGAR DIL: CPT

## 2023-09-07 PROCEDURE — 85025 COMPLETE CBC W/AUTO DIFF WBC: CPT

## 2023-09-07 PROCEDURE — 87640 STAPH A DNA AMP PROBE: CPT

## 2023-09-07 PROCEDURE — 84145 PROCALCITONIN (PCT): CPT

## 2023-09-07 PROCEDURE — 83735 ASSAY OF MAGNESIUM: CPT

## 2023-09-07 PROCEDURE — 73600 X-RAY EXAM OF ANKLE: CPT | Mod: LT

## 2023-09-07 PROCEDURE — 82040 ASSAY OF SERUM ALBUMIN: CPT

## 2023-09-07 PROCEDURE — 73630 X-RAY EXAM OF FOOT: CPT | Mod: LT

## 2023-09-07 PROCEDURE — 93005 ELECTROCARDIOGRAM TRACING: CPT

## 2023-09-07 PROCEDURE — 87116 MYCOBACTERIA CULTURE: CPT

## 2023-09-07 PROCEDURE — 86901 BLOOD TYPING SEROLOGIC RH(D): CPT

## 2023-09-07 PROCEDURE — 74177 CT ABD & PELVIS W/CONTRAST: CPT | Mod: 26,MA

## 2023-09-07 PROCEDURE — 97116 GAIT TRAINING THERAPY: CPT | Mod: GP

## 2023-09-07 PROCEDURE — 80048 BASIC METABOLIC PNL TOTAL CA: CPT

## 2023-09-07 PROCEDURE — 85730 THROMBOPLASTIN TIME PARTIAL: CPT

## 2023-09-07 PROCEDURE — 99285 EMERGENCY DEPT VISIT HI MDM: CPT

## 2023-09-07 RX ORDER — CEFTRIAXONE 500 MG/1
1000 INJECTION, POWDER, FOR SOLUTION INTRAMUSCULAR; INTRAVENOUS ONCE
Refills: 0 | Status: COMPLETED | OUTPATIENT
Start: 2023-09-07 | End: 2023-09-07

## 2023-09-07 RX ORDER — DEXTROSE 50 % IN WATER 50 %
50 SYRINGE (ML) INTRAVENOUS ONCE
Refills: 0 | Status: COMPLETED | OUTPATIENT
Start: 2023-09-07 | End: 2023-09-07

## 2023-09-07 RX ORDER — ONDANSETRON 8 MG/1
4 TABLET, FILM COATED ORAL EVERY 6 HOURS
Refills: 0 | Status: DISCONTINUED | OUTPATIENT
Start: 2023-09-07 | End: 2023-09-13

## 2023-09-07 RX ORDER — HEPARIN SODIUM 5000 [USP'U]/ML
5000 INJECTION INTRAVENOUS; SUBCUTANEOUS EVERY 12 HOURS
Refills: 0 | Status: DISCONTINUED | OUTPATIENT
Start: 2023-09-07 | End: 2023-09-11

## 2023-09-07 RX ORDER — INSULIN HUMAN 100 [IU]/ML
10 INJECTION, SOLUTION SUBCUTANEOUS ONCE
Refills: 0 | Status: COMPLETED | OUTPATIENT
Start: 2023-09-07 | End: 2023-09-07

## 2023-09-07 RX ORDER — SODIUM CHLORIDE 9 MG/ML
500 INJECTION, SOLUTION INTRAVENOUS
Refills: 0 | Status: DISCONTINUED | OUTPATIENT
Start: 2023-09-07 | End: 2023-09-10

## 2023-09-07 RX ORDER — SODIUM CHLORIDE 9 MG/ML
1000 INJECTION, SOLUTION INTRAVENOUS ONCE
Refills: 0 | Status: COMPLETED | OUTPATIENT
Start: 2023-09-07 | End: 2023-09-07

## 2023-09-07 RX ORDER — ACETAMINOPHEN 500 MG
650 TABLET ORAL EVERY 6 HOURS
Refills: 0 | Status: DISCONTINUED | OUTPATIENT
Start: 2023-09-07 | End: 2023-09-13

## 2023-09-07 RX ORDER — ALBUTEROL 90 UG/1
2.5 AEROSOL, METERED ORAL ONCE
Refills: 0 | Status: COMPLETED | OUTPATIENT
Start: 2023-09-07 | End: 2023-09-07

## 2023-09-07 RX ORDER — ALBUTEROL 90 UG/1
2 AEROSOL, METERED ORAL EVERY 6 HOURS
Refills: 0 | Status: DISCONTINUED | OUTPATIENT
Start: 2023-09-07 | End: 2023-09-13

## 2023-09-07 RX ADMIN — SODIUM CHLORIDE 1000 MILLILITER(S): 9 INJECTION, SOLUTION INTRAVENOUS at 16:01

## 2023-09-07 RX ADMIN — CEFTRIAXONE 100 MILLIGRAM(S): 500 INJECTION, POWDER, FOR SOLUTION INTRAMUSCULAR; INTRAVENOUS at 17:02

## 2023-09-07 RX ADMIN — INSULIN HUMAN 10 UNIT(S): 100 INJECTION, SOLUTION SUBCUTANEOUS at 17:39

## 2023-09-07 RX ADMIN — Medication 50 MILLILITER(S): at 19:06

## 2023-09-07 RX ADMIN — ALBUTEROL 2.5 MILLIGRAM(S): 90 AEROSOL, METERED ORAL at 17:37

## 2023-09-07 RX ADMIN — ONDANSETRON 4 MILLIGRAM(S): 8 TABLET, FILM COATED ORAL at 22:39

## 2023-09-07 RX ADMIN — Medication 50 MILLILITER(S): at 17:36

## 2023-09-07 NOTE — ED PROVIDER NOTE - DIFFERENTIAL DIAGNOSIS
Differential Diagnosis urinary retention vs uti vs pyelonephritis vs mass (uterine, bladder, esophageal), esophageal motility disorder

## 2023-09-07 NOTE — ED PROVIDER NOTE - OBJECTIVE STATEMENT
57-year-old female past medical history of asthma, hypertension, osteomyelitis of left ankle, prediabetes presents for evaluation.  Patient endorses inability to completely void x1 week stating very small drops of urine will come out.  Endorses having increased urgency and suprapubic discomfort.  No inciting or relieving factors.  Patient also endorses sore throat with difficulty swallowing.  Denies fever, headache, chest pain, shortness of breath, back pain, vomiting, diarrhea.

## 2023-09-07 NOTE — ED PROVIDER NOTE - PHYSICAL EXAMINATION
CONST: Well appearing in NAD  EYES: Sclera and conjunctiva clear.   ENT: No nasal discharge. Vesicles to posterior oropharynx. No abscess or swelling. Uvula midline.   NECK: Non-tender, no meningeal signs. normal ROM. supple   CARD: Tachycardiac S1 S2; No jvd  RESP: Equal BS B/L, No wheezes, rhonchi or rales. No distress  GI: Soft, non-tender, non-distended. no cva tenderness. normal BS  MS: Normal ROM in all extremities. pulses 2 +. no calf tenderness or swelling  SKIN: Warm, dry, no acute rashes. Good turgor  NEURO: A&Ox3, No focal deficits. Strength 5/5 with no sensory deficits.

## 2023-09-07 NOTE — ED PROVIDER NOTE - CARE PLAN
Assessment and plan of treatment:	Plan - labs ct (a/p and neck) ua ekg reassess   1 Principal Discharge DX:	Urinary retention  Assessment and plan of treatment:	Plan - labs ct (a/p and neck) ua ekg reassess  Secondary Diagnosis:	DILIP (acute kidney injury)  Secondary Diagnosis:	UTI (urinary tract infection)  Secondary Diagnosis:	Dental abscess  Secondary Diagnosis:	Cerebral aneurysm

## 2023-09-07 NOTE — ED PROVIDER NOTE - CLINICAL SUMMARY MEDICAL DECISION MAKING FREE TEXT BOX
57-year-old female presented to the emergency department for generalized weakness and decreased urinary output.  Patient found to be in urinary retention, Perez placed with over 1 L output. Labs and EKG were ordered and reviewed.  Imaging was ordered and reviewed by me.  Appropriate medications for patient's presenting complaints were ordered and effects were reassessed.  Patient's records (prior hospital, ED visit, and/or nursing home notes if available) were reviewed.  Additional history was obtained from EMS, family, and/or PCP (where available).  Escalation to admission/observation was considered.  Pt found to have UTI, abx given. Pt with UTI, DILIP and urinary retention with other incidental findings. Patient requires inpatient hospitalization admitted to medical floor for further care.

## 2023-09-07 NOTE — ED ADULT NURSE REASSESSMENT NOTE - NS ED NURSE REASSESS COMMENT FT1
Pt. received alert and oriented x4 and resting comfortably in stretcher. No respiratory distress noted. Family member at bedside. Cardiac monitoring ongoing. Pt. able to make needs known. Plan of care ongoing.

## 2023-09-07 NOTE — ED PROVIDER NOTE - ATTENDING APP SHARED VISIT CONTRIBUTION OF CARE
57-year-old female past medical history of hypertension, left ankle osteomyelitis after surgical procedure, and asthma presents to the emergency department for generalized weakness and fatigue for the past week associated with decreased urinary output.  Patient also reporting difficulty swallowing solids but is able to to swallow liquids.  Patient also endorses 10 pound weight loss unintentional.  Patient denies fever, night sweats, dysuria, hematuria, bloody stools, nausea, vomiting, chest pain, abdominal pain, shortness of breath, cough, numbness, tingling.    Vital Signs: I have reviewed the initial vital signs.  Constitutional: Patient in no acute distress. cachetic   Integumentary: No rash.  ENT: MMM, vesicles to posterior pharynx, no exudates, minimal erythema   NECK: Supple.   Cardiovascular: RRR, radial pulses 2/4 bilaterally.   Respiratory: Breath sounds CTA b/l, no wheezing or crackles, good air exchange, good resp effort and excursion, no accessory muscle use, no stridor.  Gastrointestinal: Abdomen soft, +suprapubic tenderness, non-distended, no rebound tenderness or guarding, no CVAT.   Musculoskeletal: FROM, no edema, no calf pain/swelling/erythema. small healing wound to L lateral malleolus no crepitus, erythema, drainage. soft compartments. distal pulses intact b/l.   Neurologic: AAOx3, motor 5/5 and sensation intact throughout upper and lower ext, CN II-XII intact, No facial droop or slurring of speech. No focal deficits.

## 2023-09-08 DIAGNOSIS — I26.99 OTHER PULMONARY EMBOLISM WITHOUT ACUTE COR PULMONALE: ICD-10-CM

## 2023-09-08 LAB
ALBUMIN SERPL ELPH-MCNC: 3.3 G/DL — LOW (ref 3.5–5.2)
ALP SERPL-CCNC: 90 U/L — SIGNIFICANT CHANGE UP (ref 30–115)
ALT FLD-CCNC: 9 U/L — SIGNIFICANT CHANGE UP (ref 0–41)
ANION GAP SERPL CALC-SCNC: 14 MMOL/L — SIGNIFICANT CHANGE UP (ref 7–14)
AST SERPL-CCNC: 14 U/L — SIGNIFICANT CHANGE UP (ref 0–41)
BASOPHILS # BLD AUTO: 0.09 K/UL — SIGNIFICANT CHANGE UP (ref 0–0.2)
BASOPHILS NFR BLD AUTO: 0.7 % — SIGNIFICANT CHANGE UP (ref 0–1)
BILIRUB SERPL-MCNC: <0.2 MG/DL — SIGNIFICANT CHANGE UP (ref 0.2–1.2)
BUN SERPL-MCNC: 49 MG/DL — HIGH (ref 10–20)
CALCIUM SERPL-MCNC: 9 MG/DL — SIGNIFICANT CHANGE UP (ref 8.4–10.5)
CHLORIDE SERPL-SCNC: 100 MMOL/L — SIGNIFICANT CHANGE UP (ref 98–110)
CO2 SERPL-SCNC: 15 MMOL/L — LOW (ref 17–32)
CREAT SERPL-MCNC: 2.4 MG/DL — HIGH (ref 0.7–1.5)
CULTURE RESULTS: NO GROWTH — SIGNIFICANT CHANGE UP
EGFR: 23 ML/MIN/1.73M2 — LOW
EOSINOPHIL # BLD AUTO: 0.13 K/UL — SIGNIFICANT CHANGE UP (ref 0–0.7)
EOSINOPHIL NFR BLD AUTO: 1 % — SIGNIFICANT CHANGE UP (ref 0–8)
GLUCOSE SERPL-MCNC: 87 MG/DL — SIGNIFICANT CHANGE UP (ref 70–99)
HCT VFR BLD CALC: 31.2 % — LOW (ref 37–47)
HGB BLD-MCNC: 10.8 G/DL — LOW (ref 12–16)
IMM GRANULOCYTES NFR BLD AUTO: 0.6 % — HIGH (ref 0.1–0.3)
LACTATE SERPL-SCNC: 0.8 MMOL/L — SIGNIFICANT CHANGE UP (ref 0.7–2)
LYMPHOCYTES # BLD AUTO: 19.3 % — LOW (ref 20.5–51.1)
LYMPHOCYTES # BLD AUTO: 2.62 K/UL — SIGNIFICANT CHANGE UP (ref 1.2–3.4)
MAGNESIUM SERPL-MCNC: 2.1 MG/DL — SIGNIFICANT CHANGE UP (ref 1.8–2.4)
MCHC RBC-ENTMCNC: 32 PG — HIGH (ref 27–31)
MCHC RBC-ENTMCNC: 34.6 G/DL — SIGNIFICANT CHANGE UP (ref 32–37)
MCV RBC AUTO: 92.3 FL — SIGNIFICANT CHANGE UP (ref 81–99)
MONOCYTES # BLD AUTO: 1.23 K/UL — HIGH (ref 0.1–0.6)
MONOCYTES NFR BLD AUTO: 9.1 % — SIGNIFICANT CHANGE UP (ref 1.7–9.3)
NEUTROPHILS # BLD AUTO: 9.4 K/UL — HIGH (ref 1.4–6.5)
NEUTROPHILS NFR BLD AUTO: 69.3 % — SIGNIFICANT CHANGE UP (ref 42.2–75.2)
NRBC # BLD: 0 /100 WBCS — SIGNIFICANT CHANGE UP (ref 0–0)
OSMOLALITY SERPL: 295 MOS/KG — SIGNIFICANT CHANGE UP (ref 275–300)
PHOSPHATE SERPL-MCNC: 4.2 MG/DL — SIGNIFICANT CHANGE UP (ref 2.1–4.9)
PLATELET # BLD AUTO: 426 K/UL — HIGH (ref 130–400)
PMV BLD: 10.3 FL — SIGNIFICANT CHANGE UP (ref 7.4–10.4)
POTASSIUM SERPL-MCNC: 4.2 MMOL/L — SIGNIFICANT CHANGE UP (ref 3.5–5)
POTASSIUM SERPL-SCNC: 4.2 MMOL/L — SIGNIFICANT CHANGE UP (ref 3.5–5)
PROCALCITONIN SERPL-MCNC: 0.22 NG/ML — HIGH (ref 0.02–0.1)
PROT SERPL-MCNC: 6.4 G/DL — SIGNIFICANT CHANGE UP (ref 6–8)
RBC # BLD: 3.38 M/UL — LOW (ref 4.2–5.4)
RBC # FLD: 13.3 % — SIGNIFICANT CHANGE UP (ref 11.5–14.5)
SODIUM SERPL-SCNC: 129 MMOL/L — LOW (ref 135–146)
SPECIMEN SOURCE: SIGNIFICANT CHANGE UP
WBC # BLD: 13.55 K/UL — HIGH (ref 4.8–10.8)
WBC # FLD AUTO: 13.55 K/UL — HIGH (ref 4.8–10.8)

## 2023-09-08 PROCEDURE — 73630 X-RAY EXAM OF FOOT: CPT | Mod: 26,LT

## 2023-09-08 PROCEDURE — 73610 X-RAY EXAM OF ANKLE: CPT | Mod: 26,LT

## 2023-09-08 PROCEDURE — 99223 1ST HOSP IP/OBS HIGH 75: CPT

## 2023-09-08 PROCEDURE — 71045 X-RAY EXAM CHEST 1 VIEW: CPT | Mod: 26

## 2023-09-08 RX ORDER — BENZOCAINE 10 %
1 GEL (GRAM) MUCOUS MEMBRANE THREE TIMES A DAY
Refills: 0 | Status: DISCONTINUED | OUTPATIENT
Start: 2023-09-08 | End: 2023-09-13

## 2023-09-08 RX ORDER — CEFEPIME 1 G/1
1000 INJECTION, POWDER, FOR SOLUTION INTRAMUSCULAR; INTRAVENOUS EVERY 12 HOURS
Refills: 0 | Status: DISCONTINUED | OUTPATIENT
Start: 2023-09-08 | End: 2023-09-13

## 2023-09-08 RX ORDER — INFLUENZA VIRUS VACCINE 15; 15; 15; 15 UG/.5ML; UG/.5ML; UG/.5ML; UG/.5ML
0.5 SUSPENSION INTRAMUSCULAR ONCE
Refills: 0 | Status: DISCONTINUED | OUTPATIENT
Start: 2023-09-08 | End: 2023-09-19

## 2023-09-08 RX ORDER — METRONIDAZOLE 500 MG
500 TABLET ORAL EVERY 8 HOURS
Refills: 0 | Status: DISCONTINUED | OUTPATIENT
Start: 2023-09-08 | End: 2023-09-13

## 2023-09-08 RX ORDER — CEFEPIME 1 G/1
1000 INJECTION, POWDER, FOR SOLUTION INTRAMUSCULAR; INTRAVENOUS ONCE
Refills: 0 | Status: COMPLETED | OUTPATIENT
Start: 2023-09-08 | End: 2023-09-08

## 2023-09-08 RX ORDER — CEFTRIAXONE 500 MG/1
1000 INJECTION, POWDER, FOR SOLUTION INTRAMUSCULAR; INTRAVENOUS EVERY 24 HOURS
Refills: 0 | Status: DISCONTINUED | OUTPATIENT
Start: 2023-09-08 | End: 2023-09-08

## 2023-09-08 RX ORDER — BENZOCAINE AND MENTHOL 5; 1 G/100ML; G/100ML
1 LIQUID ORAL
Refills: 0 | Status: DISCONTINUED | OUTPATIENT
Start: 2023-09-08 | End: 2023-09-08

## 2023-09-08 RX ORDER — AZITHROMYCIN 500 MG/1
500 TABLET, FILM COATED ORAL ONCE
Refills: 0 | Status: DISCONTINUED | OUTPATIENT
Start: 2023-09-08 | End: 2023-09-08

## 2023-09-08 RX ORDER — PANTOPRAZOLE SODIUM 20 MG/1
40 TABLET, DELAYED RELEASE ORAL
Refills: 0 | Status: DISCONTINUED | OUTPATIENT
Start: 2023-09-08 | End: 2023-09-13

## 2023-09-08 RX ORDER — AZITHROMYCIN 500 MG/1
TABLET, FILM COATED ORAL
Refills: 0 | Status: DISCONTINUED | OUTPATIENT
Start: 2023-09-08 | End: 2023-09-08

## 2023-09-08 RX ORDER — POLYETHYLENE GLYCOL 3350 17 G/17G
17 POWDER, FOR SOLUTION ORAL DAILY
Refills: 0 | Status: DISCONTINUED | OUTPATIENT
Start: 2023-09-08 | End: 2023-09-13

## 2023-09-08 RX ORDER — METRONIDAZOLE 500 MG
500 TABLET ORAL ONCE
Refills: 0 | Status: COMPLETED | OUTPATIENT
Start: 2023-09-08 | End: 2023-09-08

## 2023-09-08 RX ORDER — SODIUM CHLORIDE 9 MG/ML
1000 INJECTION, SOLUTION INTRAVENOUS ONCE
Refills: 0 | Status: COMPLETED | OUTPATIENT
Start: 2023-09-08 | End: 2023-09-08

## 2023-09-08 RX ORDER — METRONIDAZOLE 500 MG
TABLET ORAL
Refills: 0 | Status: DISCONTINUED | OUTPATIENT
Start: 2023-09-08 | End: 2023-09-13

## 2023-09-08 RX ORDER — CEFEPIME 1 G/1
INJECTION, POWDER, FOR SOLUTION INTRAMUSCULAR; INTRAVENOUS
Refills: 0 | Status: DISCONTINUED | OUTPATIENT
Start: 2023-09-08 | End: 2023-09-13

## 2023-09-08 RX ORDER — AMLODIPINE BESYLATE 2.5 MG/1
1 TABLET ORAL
Refills: 0 | DISCHARGE

## 2023-09-08 RX ADMIN — CEFEPIME 100 MILLIGRAM(S): 1 INJECTION, POWDER, FOR SOLUTION INTRAMUSCULAR; INTRAVENOUS at 19:08

## 2023-09-08 RX ADMIN — SODIUM CHLORIDE 500 MILLILITER(S): 9 INJECTION, SOLUTION INTRAVENOUS at 09:30

## 2023-09-08 RX ADMIN — SODIUM CHLORIDE 100 MILLILITER(S): 9 INJECTION, SOLUTION INTRAVENOUS at 21:33

## 2023-09-08 RX ADMIN — POLYETHYLENE GLYCOL 3350 17 GRAM(S): 17 POWDER, FOR SOLUTION ORAL at 17:38

## 2023-09-08 RX ADMIN — CEFEPIME 100 MILLIGRAM(S): 1 INJECTION, POWDER, FOR SOLUTION INTRAMUSCULAR; INTRAVENOUS at 02:21

## 2023-09-08 RX ADMIN — Medication 100 MILLIGRAM(S): at 17:37

## 2023-09-08 RX ADMIN — PANTOPRAZOLE SODIUM 40 MILLIGRAM(S): 20 TABLET, DELAYED RELEASE ORAL at 06:10

## 2023-09-08 RX ADMIN — HEPARIN SODIUM 5000 UNIT(S): 5000 INJECTION INTRAVENOUS; SUBCUTANEOUS at 19:08

## 2023-09-08 RX ADMIN — SODIUM CHLORIDE 100 MILLILITER(S): 9 INJECTION, SOLUTION INTRAVENOUS at 00:05

## 2023-09-08 RX ADMIN — Medication 650 MILLIGRAM(S): at 20:00

## 2023-09-08 RX ADMIN — HEPARIN SODIUM 5000 UNIT(S): 5000 INJECTION INTRAVENOUS; SUBCUTANEOUS at 06:10

## 2023-09-08 RX ADMIN — Medication 650 MILLIGRAM(S): at 19:07

## 2023-09-08 RX ADMIN — Medication 100 MILLIGRAM(S): at 21:42

## 2023-09-08 RX ADMIN — Medication 100 MILLIGRAM(S): at 09:30

## 2023-09-08 NOTE — H&P ADULT - HISTORY OF PRESENT ILLNESS
57-year-old female past medical history of asthma, hypertension, osteomyelitis of left ankle (not on Ab), prediabetes presented for 7-10 day history of difficulty urinating. She reports having pain initiating the stream, and only having a few drops of urine come out at at time. No pain while urinating. No hematuria. This has been associated with 1 week of nausea/ vomiting clear liquid and inability to eat. She reports losing 5-10 pounds over the past week. No abdominal pain. No reported fever / chills. No diarrhea. ROS also positive for productive cough (whitish sputum) over the past week. No chest pain / SOB. No sick contacts. No recent hospitalization or antibiotic use. She has 2 previous episodes of UTI (one of which she got admitted to [facility] for). She also has osteomyelitis of her R ankle, not on treatment, was planned for surgery + biopsy before she got sick.      In ED, vitals showed: BP: 100/65, HR:127, RR: 18, temp: 97.3, spO2: 98% --> HR improved with fluids  Labs significant for WBC: 21K, creatinine: 3.9, Na: 130, K:5.9, bicarb: 13, A, lactate 1.6   UA: positive for blood and LE  - CT:Diffuse urinary bladder wall thickening, mucosal hyperenhancement, compatible with urinary bladder infection, cystitis. Increased right renal pelvis and ureter urothelial enhancement, compatible with ascending urinary tract infection.  - CT Neck: Dental caries and odontogenic disease. Impacted right molar tooth associated with periapical lucency and buccal dehiscence. Small 3 x 3 mm  abscess is visualized adjacent to the right mandible.Left middle cerebral artery, M2 segment 7 mm aneurysm. Scattered groundglass opacities and tree-in-bud nodules. Etiology   favors infection/inflammation.    s/p 1L LR _ ceftriaxone, admitted to medicine    57-year-old female past medical history of asthma, hypertension, osteomyelitis of left ankle (not on Ab), prediabetes presented for 7-10 day history of difficulty urinating. She reports having pain initiating the stream, and only having a few drops of urine come out at at time. No pain while urinating. No hematuria. This has been associated with 1 week of nausea/ vomiting clear liquid and inability to eat. She reports losing 5-10 pounds over the past week. No abdominal pain. No reported fever / chills. No diarrhea. ROS also positive for productive cough (whitish sputum) over the past week + sore throat. No chest pain / SOB. No sick contacts. No recent hospitalization or antibiotic use. She has 2 previous episodes of UTI (one of which she got admitted to [facility] for). She also has osteomyelitis of her R ankle, not on treatment, was planned for surgery + biopsy before she got sick.      In ED, vitals showed: BP: 100/65, HR:127, RR: 18, temp: 97.3, spO2: 98% --> HR improved with fluids  Labs significant for WBC: 21K, creatinine: 3.9, Na: 130, K:5.9, bicarb: 13, A, lactate 1.6   UA: positive for blood and LE  - CT:Diffuse urinary bladder wall thickening, mucosal hyperenhancement, compatible with urinary bladder infection, cystitis. Increased right renal pelvis and ureter urothelial enhancement, compatible with ascending urinary tract infection.  - CT Neck: Dental caries and odontogenic disease. Impacted right molar tooth associated with periapical lucency and buccal dehiscence. Small 3 x 3 mm  abscess is visualized adjacent to the right mandible.Left middle cerebral artery, M2 segment 7 mm aneurysm. Scattered groundglass opacities and tree-in-bud nodules. Etiology   favors infection/inflammation.    s/p 1L LR _ ceftriaxone, admitted to medicine

## 2023-09-08 NOTE — CONSULT NOTE ADULT - SUBJECTIVE AND OBJECTIVE BOX
Podiatry Consult Note    Subjective:  LUZ MARINA WISE is a 57 yr/o female who was seen in ED today with Attending Dr. Nathan for concerns of L ankle wound (08 Sep 2023 00:48).  She underwent ORIF of a L ankle Fx in Aug 2022 at [facility], after which, she reports she has had a chronic draining sinus tract with a small wound at the lateral ankle that never healed. She reports that it drains serous fluid. She says that her ankle has been continuously painful since surgery. She follows with podiatrist Dr. Montoya at [facility].   Our podiatry team saw the patient on 8/17/23 with no plans for surgical intervention from podiatry standpoint and to refer to ortho due to the fibular fracture just proximal to the hardware. Ortho team was on board and Dr. Simmons stated "in order to treat this draining sinus, it would have to be treated with I&D, removal of hardware and placement of beads and PICC line"     HPI:   57-year-old female past medical history of asthma, hypertension, osteomyelitis of left ankle (not on Ab), prediabetes presented for 7-10 day history of difficulty urinating. She reports having pain initiating the stream, and only having a few drops of urine come out at at time. No pain while urinating. No hematuria. This has been associated with 1 week of nausea/ vomiting clear liquid and inability to eat. She reports losing 5-10 pounds over the past week. No abdominal pain. No reported fever / chills. No diarrhea. ROS also positive for productive cough (whitish sputum) over the past week + sore throat. No chest pain / SOB. No sick contacts. No recent hospitalization or antibiotic use. She has 2 previous episodes of UTI (one of which she got admitted to [facility] for). She also has osteomyelitis of her R ankle, not on treatment, was planned for surgery + biopsy before she got sick.        PAST MEDICAL & SURGICAL HISTORY:       Review of Systems:  [X] Ten point review of systems is otherwise negative except as noted     Objective:  Vital Signs Last 24 Hrs  T(C): 36.5 (08 Sep 2023 07:54), Max: 36.9 (07 Sep 2023 15:35)  T(F): 97.7 (08 Sep 2023 07:54), Max: 98.4 (07 Sep 2023 15:35)  HR: 90 (08 Sep 2023 07:54) (77 - 127)  BP: 80/52 (08 Sep 2023 07:54) (80/52 - 105/60)  BP(mean): --  RR: 18 (08 Sep 2023 07:54) (17 - 18)  SpO2: 100% (08 Sep 2023 07:54) (98% - 100%)    Parameters below as of 08 Sep 2023 07:54  Patient On (Oxygen Delivery Method): room air                            10.8   13.55 )-----------( 426      ( 08 Sep 2023 06:33 )             31.2                 09-08    129<L>  |  100  |  49<H>  ----------------------------<  87  4.2   |  15<L>  |  2.4<H>    Ca    9.0      08 Sep 2023 06:33  Phos  4.2     09-08  Mg     2.1     09-08    TPro  6.4  /  Alb  3.3<L>  /  TBili  <0.2  /  DBili  x   /  AST  14  /  ALT  9   /  AlkPhos  90  09-08        Physical Exam - Lower Extremity Focused:   Derm: Small subcentimeter open wound proximal to the lateral malleolus,  prior surgical scar is otherwise healed  Minimal seropurulent drainage  Mild surrounding erythema, swelling  Vascular: DP and PT Pulses Diminished; Foot is Warm to Warm to the touch; Capillary Refill Time < 3 Seconds;    Neuro: Protective Sensation Diminished / Moderately Neuropathic   MSK: Pain On Palpation at Wound Site       Assessment:  Left lateral ankle draining chronic wound     Plan:  Chart reviewed and Patient evaluated. All Questions and Concerns Addressed and Answered  XR Imaging  Foot;  There is a distal fibula plate and interfragmentary lag screw. There does not appear to be any broken hardware. There is an area of  fracture with callus at the proximal aspect of the plate.  Local Wound Care;  DSD  Weight Bearing Status; NWB LLE  Patient was seen and treatment options were discussed.   Ortho consult. ; please discuss previous treatment plan to patient as she is confused on what is going on.   Patient will continue to be followed by Ortho team at this time and no podiatry intervention is required  Patient can f/u as an outpatient with Dr Nathan if she chooses for wound care management  Discussed Plan w/ Dr Nathan     Podiatry

## 2023-09-08 NOTE — PATIENT PROFILE ADULT - FUNCTIONAL ASSESSMENT - BASIC MOBILITY 6.
2-calculated by average/Not able to assess (calculate score using [facility] averaging method)

## 2023-09-08 NOTE — CONSULT NOTE ADULT - SUBJECTIVE AND OBJECTIVE BOX
Orthopaedic Surgery Consult Note    56yo Female PMH Asthma, HTN, L ankle infected hardware s/p L ankle ORIF in 8/2022 at [facility] with Dr. Jose(?) presents with urosepsis for 7-10 day history of difficulty urinating. Patient states she has had a chronic, draining wound over left ankle for past year. Patient states she has left chronic ankle pain and swelling. Denies f/c/n/v/sob. Denies numbness and tingling. Patient was seen in Dr. Simmons's clinic as an outpt and states she was supposed to be booked for surgery for I&D, removal of hardware, and placement of antibiotic beads.     PMH/PSH  Acute renal failure    Handoff    MEWS Score    Urinary retention    Pulmonary thromboembolism    WEAKNESS    DILIP (acute kidney injury)    UTI (urinary tract infection)    Dental abscess    Cerebral aneurysm    SysAdmin_VisitLink        Medications  acetaminophen     Tablet .. 650 milliGRAM(s) Oral every 6 hours PRN  albuterol    90 MICROgram(s) HFA Inhaler 2 Puff(s) Inhalation every 6 hours PRN  benzocaine 20% Spray 1 Spray(s) Topical three times a day PRN  cefepime   IVPB      cefepime   IVPB 1000 milliGRAM(s) IV Intermittent every 12 hours  heparin   Injectable 5000 Unit(s) SubCutaneous every 12 hours  lactated ringers. 500 milliLiter(s) IV Continuous <Continuous>  metroNIDAZOLE  IVPB      metroNIDAZOLE  IVPB 500 milliGRAM(s) IV Intermittent every 8 hours  ondansetron Injectable 4 milliGRAM(s) IV Push every 6 hours PRN  pantoprazole    Tablet 40 milliGRAM(s) Oral before breakfast  polyethylene glycol 3350 17 Gram(s) Oral daily      Home Medications:  amLODIPine 2.5 mg oral tablet: 1 orally once a day (08 Sep 2023 00:42)        Allergies  penicillin (Anaphylaxis)        T(C): 36.5 (09-08-23 @ 07:54), Max: 36.9 (09-07-23 @ 15:35)  HR: 90 (09-08-23 @ 07:54) (77 - 105)  BP: 80/52 (09-08-23 @ 07:54) (80/52 - 105/60)  RR: 18 (09-08-23 @ 07:54) (17 - 18)  SpO2: 100% (09-08-23 @ 07:54) (98% - 100%)    P/E:  AOx3, NAD  Non-labored breathing    LLE  1 cm draining sinus of lateral aspect of ankle  Rest of surgical incision intact  No erythema  No fluctuance  ROM ankle limited by pain  TTP over ankle  Compartments soft and compressible, no pain w/ passive stretch of digits  SILT SP/DP/T/Sural/Saph  Firing TA/EHL/FHL/GS  DP pulse 2+, cap refill <2        Labs                        10.8   13.55 )-----------( 426      ( 08 Sep 2023 06:33 )             31.2     09-08    129<L>  |  100  |  49<H>  ----------------------------<  87  4.2   |  15<L>  |  2.4<H>    Ca    9.0      08 Sep 2023 06:33  Phos  4.2     09-08  Mg     2.1     09-08    TPro  6.4  /  Alb  3.3<L>  /  TBili  <0.2  /  DBili  x   /  AST  14  /  ALT  9   /  AlkPhos  90  09-08    LIVER FUNCTIONS - ( 08 Sep 2023 06:33 )  Alb: 3.3 g/dL / Pro: 6.4 g/dL / ALK PHOS: 90 U/L / ALT: 9 U/L / AST: 14 U/L / GGT: x               Imaging:  XR L ankle  hardware intact, lucency around screws, fibula fracture at proximal end of plate    A/P:  56yo Female w/ left ankle infected hardware s/p L ankle ORIF on 8/2022    Admitted for urosepsis  Plan pending discussion wtih Dr. Simmons  NWB LLE  Pain control  Ice/elevation  Please obtain preop labs: bmp, cbc, coags, type and screen x2, ekg, cxr  Diet  DVT ppx:  SCD, per primary team   Orthopaedic Surgery Consult Note    58yo Female PMH Asthma, HTN, L ankle infected hardware s/p L ankle ORIF in 8/2022 at [facility] with Dr. Jose(?) presents with urosepsis for 7-10 day history of difficulty urinating. Patient states she has had a chronic, draining wound over left ankle for past year. Patient states she has left chronic ankle pain and swelling. Denies f/c/n/v/sob. Denies numbness and tingling. Patient was seen in Dr. Simmons's clinic as an outpt and states she was supposed to be booked for surgery for I&D, removal of hardware, and placement of antibiotic beads.     PMH/PSH  Acute renal failure    Handoff    MEWS Score    Urinary retention    Pulmonary thromboembolism    WEAKNESS    DILIP (acute kidney injury)    UTI (urinary tract infection)    Dental abscess    Cerebral aneurysm    SysAdmin_VisitLink        Medications  acetaminophen     Tablet .. 650 milliGRAM(s) Oral every 6 hours PRN  albuterol    90 MICROgram(s) HFA Inhaler 2 Puff(s) Inhalation every 6 hours PRN  benzocaine 20% Spray 1 Spray(s) Topical three times a day PRN  cefepime   IVPB      cefepime   IVPB 1000 milliGRAM(s) IV Intermittent every 12 hours  heparin   Injectable 5000 Unit(s) SubCutaneous every 12 hours  lactated ringers. 500 milliLiter(s) IV Continuous <Continuous>  metroNIDAZOLE  IVPB      metroNIDAZOLE  IVPB 500 milliGRAM(s) IV Intermittent every 8 hours  ondansetron Injectable 4 milliGRAM(s) IV Push every 6 hours PRN  pantoprazole    Tablet 40 milliGRAM(s) Oral before breakfast  polyethylene glycol 3350 17 Gram(s) Oral daily      Home Medications:  amLODIPine 2.5 mg oral tablet: 1 orally once a day (08 Sep 2023 00:42)        Allergies  penicillin (Anaphylaxis)        T(C): 36.5 (09-08-23 @ 07:54), Max: 36.9 (09-07-23 @ 15:35)  HR: 90 (09-08-23 @ 07:54) (77 - 105)  BP: 80/52 (09-08-23 @ 07:54) (80/52 - 105/60)  RR: 18 (09-08-23 @ 07:54) (17 - 18)  SpO2: 100% (09-08-23 @ 07:54) (98% - 100%)    P/E:  AOx3, NAD  Non-labored breathing    LLE  1 cm draining sinus of lateral aspect of ankle  Rest of surgical incision intact  No erythema  No fluctuance  ROM ankle limited by pain  TTP over ankle  Compartments soft and compressible, no pain w/ passive stretch of digits  SILT SP/DP/T/Sural/Saph  Firing TA/EHL/FHL/GS  DP pulse 2+, cap refill <2        Labs                        10.8   13.55 )-----------( 426      ( 08 Sep 2023 06:33 )             31.2     09-08    129<L>  |  100  |  49<H>  ----------------------------<  87  4.2   |  15<L>  |  2.4<H>    Ca    9.0      08 Sep 2023 06:33  Phos  4.2     09-08  Mg     2.1     09-08    TPro  6.4  /  Alb  3.3<L>  /  TBili  <0.2  /  DBili  x   /  AST  14  /  ALT  9   /  AlkPhos  90  09-08    LIVER FUNCTIONS - ( 08 Sep 2023 06:33 )  Alb: 3.3 g/dL / Pro: 6.4 g/dL / ALK PHOS: 90 U/L / ALT: 9 U/L / AST: 14 U/L / GGT: x               Imaging:  XR L ankle  hardware intact, lucency around screws, fibula fracture at proximal end of plate    A/P:  58yo Female w/ left ankle infected hardware s/p L ankle ORIF on 8/2022    Admitted for urosepsis  Discussed with Dr. Simmons that patient will be booked for left ankle I&D, hardware removal, antibiotic bead placement on 9/13 if patient is still here. If patient is discharged by then, she will follow-up as an outpatient  NWB LLE  Pain control  Ice/elevation  Please obtain preop labs: bmp, cbc, coags, type and screen x2, ekg, cxr  Diet  DVT ppx:  SCD, per primary team

## 2023-09-08 NOTE — CONSULT NOTE ADULT - SUBJECTIVE AND OBJECTIVE BOX
LUZ MARINA WISE  57y, Female  Allergy: penicillin (Anaphylaxis)      CHIEF COMPLAINT: urinary symptoms (08 Sep 2023 14:21)      LOS  1d    HPI:   57-year-old female past medical history of asthma, hypertension, osteomyelitis of left ankle (not on Ab), prediabetes presented for 7-10 day history of difficulty urinating. She reports having pain initiating the stream, and only having a few drops of urine come out at at time. No pain while urinating. No hematuria. This has been associated with 1 week of nausea/ vomiting clear liquid and inability to eat. She reports losing 5-10 pounds over the past week. No abdominal pain. No reported fever / chills. No diarrhea. ROS also positive for productive cough (whitish sputum) over the past week + sore throat. No chest pain / SOB. No sick contacts. No recent hospitalization or antibiotic use. She has 2 previous episodes of UTI (one of which she got admitted to [facility] for). She also has osteomyelitis of her R ankle, not on treatment, was planned for surgery + biopsy before she got sick.      In ED, vitals showed: BP: 100/65, HR:127, RR: 18, temp: 97.3, spO2: 98% --> HR improved with fluids  Labs significant for WBC: 21K, creatinine: 3.9, Na: 130, K:5.9, bicarb: 13, A, lactate 1.6   UA: positive for blood and LE  - CT:Diffuse urinary bladder wall thickening, mucosal hyperenhancement, compatible with urinary bladder infection, cystitis. Increased right renal pelvis and ureter urothelial enhancement, compatible with ascending urinary tract infection.  - CT Neck: Dental caries and odontogenic disease. Impacted right molar tooth associated with periapical lucency and buccal dehiscence. Small 3 x 3 mm  abscess is visualized adjacent to the right mandible.Left middle cerebral artery, M2 segment 7 mm aneurysm. Scattered groundglass opacities and tree-in-bud nodules. Etiology   favors infection/inflammation.    s/p 1L LR _ ceftriaxone, admitted to medicine   (08 Sep 2023 00:48)      INFECTIOUS DISEASE HISTORY:  History as above.    PAST MEDICAL & SURGICAL HISTORY:      FAMILY HISTORY  Family history reviewed and non-contributory      SOCIAL HISTORY  Social History:  Denies etoh use, drug use       ROS  General: Denies rigors, nightsweats  HEENT: Denies headache, rhinorrhea, sore throat, eye pain  CV: Denies CP, palpitations  PULM: Denies wheezing, hemoptysis  GI: Denies hematemesis, hematochezia, melena  : Denies discharge, hematuria  MSK: Denies arthralgias, myalgias  SKIN: Denies rash, lesions  NEURO: Denies paresthesias, weakness  PSYCH: Denies depression, anxiety    VITALS:  T(F): 97.9, Max: 97.9 (23 @ 16:03)  HR: 79  BP: 168/66  RR: 18Vital Signs Last 24 Hrs  T(C): 36.6 (08 Sep 2023 16:03), Max: 36.6 (08 Sep 2023 16:03)  T(F): 97.9 (08 Sep 2023 16:03), Max: 97.9 (08 Sep 2023 16:03)  HR: 79 (08 Sep 2023 16:03) (77 - 91)  BP: 168/66 (08 Sep 2023 16:03) (80/52 - 168/66)  BP(mean): --  RR: 18 (08 Sep 2023 16:03) (17 - 18)  SpO2: 99% (08 Sep 2023 16:03) (99% - 100%)    Parameters below as of 08 Sep 2023 16:03  Patient On (Oxygen Delivery Method): room air        PHYSICAL EXAM:  Gen: NAD, resting in bed  HEENT: Normocephalic, atraumatic  Neck: supple, no lymphadenopathy  CV: Regular rate & regular rhythm  Lungs: decreased BS at bases, no fremitus  Abdomen: Soft, BS present  Ext: Warm, well perfused  Neuro: non focal, awake  Skin: no rash, no erythema  Lines: no phlebitis    TESTS & MEASUREMENTS:                        10.8   13.55 )-----------( 426      ( 08 Sep 2023 06:33 )             31.2     09-08    129<L>  |  100  |  49<H>  ----------------------------<  87  4.2   |  15<L>  |  2.4<H>    Ca    9.0      08 Sep 2023 06:33  Phos  4.2     09-08  Mg     2.1     09-08    TPro  6.4  /  Alb  3.3<L>  /  TBili  <0.2  /  DBili  x   /  AST  14  /  ALT  9   /  AlkPhos  90        LIVER FUNCTIONS - ( 08 Sep 2023 06:33 )  Alb: 3.3 g/dL / Pro: 6.4 g/dL / ALK PHOS: 90 U/L / ALT: 9 U/L / AST: 14 U/L / GGT: x           Urinalysis Basic - ( 08 Sep 2023 06:33 )    Color: x / Appearance: x / SG: x / pH: x  Gluc: 87 mg/dL / Ketone: x  / Bili: x / Urobili: x   Blood: x / Protein: x / Nitrite: x   Leuk Esterase: x / RBC: x / WBC x   Sq Epi: x / Non Sq Epi: x / Bacteria: x          Lactate, Blood: 0.8 mmol/L (23 @ 06:33)  Lactate, Blood: 1.6 mmol/L (23 @ 16:00)      INFECTIOUS DISEASES TESTING      RADIOLOGY & ADDITIONAL TESTS:  I have personally reviewed the last Chest xray  CXR      CT  CT Abdomen and Pelvis w/ IV Cont:   ACC: [id number] EXAM:  CT ABDOMEN AND PELVIS IC   ORDERED BY: CANDACE DIA     PROCEDURE DATE:  2023          INTERPRETATION:  CLINICAL STATEMENT: Abdominal pain, urinary retention.    TECHNIQUE: Contiguous axial CT images were obtained from the lower chest   to the pubic symphysis following administration of 80 cc Omnipaque 350   intravenous contrast. Oral contrast was not administered. Reformatted   images in the coronal and sagittal planes were acquired. 20 cc contrast   discarded    COMPARISON CT: None.    OTHER STUDIES USED FOR CORRELATION: None.      FINDINGS:    LOWER CHEST: Mosaic attenuation of the bilateral lung fields. Right   subsegmental dependent linear atelectasis.    HEPATOBILIARY: Patent hepatic and portal veins. Nointra- or extrahepatic   biliary ductal dilation.    SPLEEN: Unremarkable.    PANCREAS: Unremarkable.    ADRENAL GLANDS: Unremarkable.    KIDNEYS: Bilateral symmetric cortical enhancement. Increased right renal   pelvis and ureter urothelial enhancement, compatible with ascending   urinary tract infection. No hydronephrosis.    ABDOMINOPELVIC NODES: Unremarkable.    PELVIC ORGANS: Diffuse urinary bladder wall thickening, mucosal   hyperenhancement, compatible with urinary bladder infection, cystitis.   Urinary bladder partially distended with Perez catheter. Post   hysterectomy.    PERITONEUM/MESENTERY/BOWEL: Unremarkable.    BONES/SOFT TISSUES: Unremarkable.    OTHER: Abdominal aortic and iliac calcifications.    IMPRESSION:    Diffuse urinary bladder wall thickening, mucosal hyperenhancement,   compatible with urinary bladder infection, cystitis.  Increased right renal pelvis and ureter urothelial enhancement,   compatible with ascending urinary tract infection.    --- End of Report ---          LINDSEY SHELTON MD; Resident Radiologist  This document has been electronically signed.  ABEL HILL MD; Attending Radiologist  This document has been electronically signed. Sep  7 2023  9:17PM (23 @ 16:55)      CARDIOLOGY TESTING  12 Lead ECG:   Ventricular Rate 104 BPM    Atrial Rate 104 BPM    P-R Interval 122 ms    QRS Duration 72 ms    Q-T Interval 320 ms    QTC Calculation(Bazett) 420 ms    P Axis 78 degrees    R Axis 77 degrees    T Axis 64 degrees    Diagnosis Line Sinus tachycardia  Otherwise normal ECG    Confirmed by Joel Isabel ([id number]) on 2023 7:30:58 PM (23 @ 18:53)      MEDICATIONS  cefepime   IVPB     cefepime   IVPB 1000 IV Intermittent every 12 hours  heparin   Injectable 5000 SubCutaneous every 12 hours  lactated ringers. 500 IV Continuous <Continuous>  metroNIDAZOLE  IVPB     metroNIDAZOLE  IVPB 500 IV Intermittent every 8 hours  pantoprazole    Tablet 40 Oral before breakfast  polyethylene glycol 3350 17 Oral daily      Weight      ANTIBIOTICS:  cefepime   IVPB      cefepime   IVPB 1000 milliGRAM(s) IV Intermittent every 12 hours  metroNIDAZOLE  IVPB      metroNIDAZOLE  IVPB 500 milliGRAM(s) IV Intermittent every 8 hours      ALLERGIES:  penicillin (Anaphylaxis)      ASSESSMENT  57-year-old female past medical history of asthma, hypertension, osteomyelitis of left ankle (not on Ab), prediabetes presented for 7-10 day history of difficulty urinating.    IMPRESSION  #Ascending UTI    #Dental infection with small abscess   - CT Neck Soft Tissue w/ IV Cont (23 @ 16:54): 1.  Multiple dental caries and periapical lucencies. Recommend follow-up  with dental exam. 2.  Apparent 3 mm rim-enhancing fluid collection overlying the right  mandibular alveolar ridge, potentially a tiny abscess (ser 4 im 87). 3.  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 4.  Scattered groundglass opacities and tree-in-bud nodules. Etiology  favors infection/inflammation.    #OM of left ankle with chroic sinus draining wound   - followed by Ortho - recommended for removal ofhardware and prolonged antibiotic course - scheduled on   - Xray Foot AP + Lateral + Oblique, Left (23 @ 13:54): The patient is status post screw and plate fixation of the distal fibula.  There is a subacute or chronic minimally displaced incompletely healed  fracture of the fibula at the proximal margin of the hardware,  demonstrating incomplete bony bridging/callus formation. The hardware is   intact. There is no radiographic evidence of osteomyelitis. No acute fracture is  seen. There is no dislocation.There is no definite ankle mortise  asymmetry. Osteopenia is noted. There is evidence of skin ulceration over the  lateral malleolus/fibular plate.      #Abx allergy: penicillin (Anaphylaxis)    RECOMMENDATIONS  - continue cefepime 1g q 12 hours   - continue flagyl 500 mg q 8 hours   - dental evaluation   - follow-up urine cx     Please call or message on [employer] Teams if with any questions.  Spectra [id number]

## 2023-09-08 NOTE — H&P ADULT - NSHPPHYSICALEXAM_GEN_ALL_CORE
VITALS:   T(C): 36.3 (09-08-23 @ 00:48), Max: 36.9 (09-07-23 @ 15:35)  HR: 91 (09-08-23 @ 00:48) (91 - 127)  BP: 90/60 (09-08-23 @ 00:48) (90/60 - 105/60)  RR: 17 (09-08-23 @ 00:48) (17 - 18)  SpO2: 100% (09-08-23 @ 00:48) (98% - 100%)    GENERAL: NAD, lying comfortably in bed  LUNG: Clear to auscultation bilaterally, No  rhonchi, No wheezing  HEART: Regular rate and rhythm, No murmurs  ABDOMEN: Soft, nontender, nondistended  EXTREMITIES:  No edema, R ankle wrapped  NERVOUS SYSTEM:  A&Ox3  SKIN: No rashes or lesions

## 2023-09-08 NOTE — H&P ADULT - ATTENDING COMMENTS
57-year-old female past medical history of asthma, hypertension, osteomyelitis of left ankle (not on Ab), prediabetes presented for 7-10 day history of difficulty urinating, found to have urosepsis.     1. Sepsis secondary to Acute UTI (ascending infection)  + Dental carries + abscess adjacent to R mandible  - Admit to medicine               -u/a:positive                   - RVP:pending                  -LA resolved   - Start Cefepime. Add flagyl   - Blood cx:pending                          - Urine cx:pending   - Received 1 liter of fluid in the ER. Give another 1 liter of LR followed by LR at 100 ml/hr  - CT abdomen pelvis:  a) Diffuse urinary bladder wall thickening, mucosal hyperenhancement, compatible with urinary bladder infection, cystitis.  Increased right renal pelvis and ureter urothelial enhancement, compatible with ascending urinary tract infection.  - CT neck:  a) Multiple dental caries and periapical lucencies. Recommend follow-up with dental exam.  b) Apparent 3 mm rim-enhancing fluid collection overlying the right mandibular alveolar ridge, potentially a tiny abscess (ser 4 im 87).  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  d) Scattered groundglass opacities and tree-in-bud nodules. Etiology favors infection/inflammation.  - Consult dental for dental abscess adjacent to R mandible on CT neck :pending   - Consult ID:pending      2. DILIP secondary to urinary obstruction / dehydration complicated by acute hyperkalemia and acute hyponatremia s/p delong catheter resolving   - Keep delong   - Miralax daily to prevent constipation        3. Osteomyelitis of R ankle  - follows up with outside doctor  - was planned for surgery + biopsy but she got sick   - check Xray foot + ankle   - podiatry consult after xray result    4. HTN  - on amlodipine, hold     5. Asthma = not in exacerbation     DIET: soft  diet   GI Prophylaxis: protonix  DVT prophylaxis: heparin  DISPO: acute 57-year-old female past medical history of asthma, hypertension, osteomyelitis of left ankle (not on Ab), prediabetes presented for 7-10 day history of difficulty urinating, found to have urosepsis.     1. Sepsis secondary to Acute UTI (ascending infection)  + Dental carries + abscess adjacent to R mandible  * pt denies dysuria now   - Admit to medicine               -u/a:positive                   - RVP:pending                  -LA resolved   - Start Cefepime. Add flagyl   - Blood cx:pending                          - Urine cx:pending   - Received 1 liter of fluid in the ER. Give another 1 liter of LR followed by LR at 100 ml/hr  - CT abdomen pelvis:  a) Diffuse urinary bladder wall thickening, mucosal hyperenhancement, compatible with urinary bladder infection, cystitis.  Increased right renal pelvis and ureter urothelial enhancement, compatible with ascending urinary tract infection.  - CT neck:  a) Multiple dental caries and periapical lucencies. Recommend follow-up with dental exam.  b) Apparent 3 mm rim-enhancing fluid collection overlying the right mandibular alveolar ridge, potentially a tiny abscess (ser 4 im 87).  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  d) Scattered groundglass opacities and tree-in-bud nodules. Etiology favors infection/inflammation.  - Consult dental for dental abscess adjacent to R mandible on CT neck :pending   - Consult ID:pending      2. DILIP secondary to urinary obstruction / dehydration complicated by acute hyperkalemia and acute hyponatremia s/p delong catheter resolving   - Keep delong   - Miralax daily to prevent constipation     3. Osteomyelitis of R ankle  - follows up with outside doctor  - was planned for surgery + biopsy but she got sick   - check Xray foot + ankle   - Ortho consult:pending   * pt see Dr.Justin Simmons orthopedic and was planning for surgery next monday     4. HTN  - on amlodipine, hold     5. Asthma = not in exacerbation     DIET: soft  diet   GI Prophylaxis: protonix  DVT prophylaxis: heparin  DISPO: acute

## 2023-09-08 NOTE — PATIENT PROFILE ADULT - FALL HARM RISK - HARM RISK INTERVENTIONS

## 2023-09-08 NOTE — H&P ADULT - ASSESSMENT
57-year-old female past medical history of asthma, hypertension, osteomyelitis of left ankle (not on Ab), prediabetes presented for 7-10 day history of difficulty urinating. She reports having pain initiating the stream, and only having a few drops of urine come out at at time. No pain while urinating. No hematuria. This has been associated with 1 week of nausea/ vomiting clear liquid and inability to eat. She reports losing 5-10 pounds over the past week. No abdominal pain. No reported fever / chills. No diarrhea. ROS also positive for productive cough (whitish sputum) over the past week. No chest pain / SOB. No sick contacts. No recent hospitalization or antibiotic use. She has 2 previous episodes of UTI (one of which she got admitted to [facility] for). She also has osteomyelitis of her R ankle, not on treatment, was planned for surgery + biopsy before she got sick.      In ED, vitals showed: BP: 100/65, HR:127, RR: 18, temp: 97.3, spO2: 98% --> HR improved with fluids  Labs significant for WBC: 21K, creatinine: 3.9, Na: 130, K:5.9, bicarb: 13, A, lactate 1.6   UA: positive for blood and LE  - CT:Diffuse urinary bladder wall thickening, mucosal hyperenhancement, compatible with urinary bladder infection, cystitis. Increased right renal pelvis and ureter urothelial enhancement, compatible with ascending urinary tract infection.  - CT Neck: Dental caries and odontogenic disease. Impacted right molar tooth associated with periapical lucency and buccal dehiscence. Small 3 x 3 mm  abscess is visualized adjacent to the right mandible.Left middle cerebral artery, M2 segment 7 mm aneurysm. Scattered groundglass opacities and tree-in-bud nodules. Etiology   favors infection/inflammation.    s/p 1L LR _ ceftriaxone, admitted to medicine    57-year-old female past medical history of asthma, hypertension, osteomyelitis of left ankle (not on Ab), prediabetes presented for 7-10 day history of difficulty urinating, found to have urosepsis.         UA: positive for blood and LE  - CT:Diffuse urinary bladder wall thickening, mucosal hyperenhancement, compatible with urinary bladder infection, cystitis. Increased right renal pelvis and ureter urothelial enhancement, compatible with ascending urinary tract infection.  - CT Neck: Dental caries and odontogenic disease. Impacted right molar tooth associated with periapical lucency and buccal dehiscence. Small 3 x 3 mm  abscess is visualized adjacent to the right mandible.Left middle cerebral artery, M2 segment 7 mm aneurysm. Scattered groundglass opacities and tree-in-bud nodules. Etiology   favors infection/inflammation.      #DILIP secondary to urinary obstruction / dehydration  #HAGMA (BUN)  #Hyperkalemia --> resolved  #Hyponatremia  - lactate normal   - check ABGs for pH  - continue fluid hydration   - continue delong   - check urine lytes   - monitor electrolytes closely   - nephro consult if Na worsening      #Osteomyelitis of R ankle  - follows up with outside doctor  - was planned for surgery + biopsy but she got sick   - check Xray foot + ankle   - podiatry consult after xray result          57-year-old female past medical history of asthma, hypertension, osteomyelitis of left ankle (not on Ab), prediabetes presented for 7-10 day history of difficulty urinating, found to have urosepsis.     #Urosepsis   #Cystitis + ascending UTI   #Ground-glass opacities on CT  #Dental carries + abscess adjacent to R mandible  - UA: positive for blood and LE  - s/p ceftriaxone in ED  - start cefepime treating UTI & possible CAP, narrow antibiotics according to culture  - Check CXR, RVP, procal  - f/u urine & blood cultures  - consult dental for dental abscess adjacent to R mandible on CT neck   - consult ID     #[place] / vomiting in setting of urosepsis  - IV hydration   - Zofran PRN (monitor Qtc)  - full liquid, advance as tolerated    #DILIP secondary to urinary obstruction / dehydration  #No hydronephrosis on CT  #HAGMA (BUN)  #Hyperkalemia --> resolved  #Hyponatremia  - lactate normal   - check ABGs for pH  - continue fluid hydration   - continue delong   - check urine lytes   - monitor electrolytes closely   - nephro consult if Na worsening      #Osteomyelitis of R ankle  - follows up with outside doctor  - was planned for surgery + biopsy but she got sick   - check Xray foot + ankle   - podiatry consult after xray result    #HTN  - on amlodipine, hold     #Asthma = not in exacerbation     DIET: full liquid  GI Prophylaxis: protonix  DVT prophylaxis: heparin  DISPO: acute

## 2023-09-09 LAB
ANION GAP SERPL CALC-SCNC: 12 MMOL/L — SIGNIFICANT CHANGE UP (ref 7–14)
BASOPHILS # BLD AUTO: 0.05 K/UL — SIGNIFICANT CHANGE UP (ref 0–0.2)
BASOPHILS NFR BLD AUTO: 0.5 % — SIGNIFICANT CHANGE UP (ref 0–1)
BUN SERPL-MCNC: 27 MG/DL — HIGH (ref 10–20)
CALCIUM SERPL-MCNC: 8.5 MG/DL — SIGNIFICANT CHANGE UP (ref 8.4–10.4)
CHLORIDE SERPL-SCNC: 106 MMOL/L — SIGNIFICANT CHANGE UP (ref 98–110)
CO2 SERPL-SCNC: 17 MMOL/L — SIGNIFICANT CHANGE UP (ref 17–32)
CREAT SERPL-MCNC: 1.2 MG/DL — SIGNIFICANT CHANGE UP (ref 0.7–1.5)
EGFR: 53 ML/MIN/1.73M2 — LOW
EOSINOPHIL # BLD AUTO: 0.14 K/UL — SIGNIFICANT CHANGE UP (ref 0–0.7)
EOSINOPHIL NFR BLD AUTO: 1.3 % — SIGNIFICANT CHANGE UP (ref 0–8)
GLUCOSE SERPL-MCNC: 79 MG/DL — SIGNIFICANT CHANGE UP (ref 70–99)
HCT VFR BLD CALC: 26.6 % — LOW (ref 37–47)
HGB BLD-MCNC: 9 G/DL — LOW (ref 12–16)
IMM GRANULOCYTES NFR BLD AUTO: 0.5 % — HIGH (ref 0.1–0.3)
LYMPHOCYTES # BLD AUTO: 1.59 K/UL — SIGNIFICANT CHANGE UP (ref 1.2–3.4)
LYMPHOCYTES # BLD AUTO: 14.9 % — LOW (ref 20.5–51.1)
MAGNESIUM SERPL-MCNC: 1.7 MG/DL — LOW (ref 1.8–2.4)
MCHC RBC-ENTMCNC: 31.8 PG — HIGH (ref 27–31)
MCHC RBC-ENTMCNC: 33.8 G/DL — SIGNIFICANT CHANGE UP (ref 32–37)
MCV RBC AUTO: 94 FL — SIGNIFICANT CHANGE UP (ref 81–99)
MONOCYTES # BLD AUTO: 0.74 K/UL — HIGH (ref 0.1–0.6)
MONOCYTES NFR BLD AUTO: 6.9 % — SIGNIFICANT CHANGE UP (ref 1.7–9.3)
NEUTROPHILS # BLD AUTO: 8.09 K/UL — HIGH (ref 1.4–6.5)
NEUTROPHILS NFR BLD AUTO: 75.9 % — HIGH (ref 42.2–75.2)
NRBC # BLD: 0 /100 WBCS — SIGNIFICANT CHANGE UP (ref 0–0)
PLATELET # BLD AUTO: 356 K/UL — SIGNIFICANT CHANGE UP (ref 130–400)
PMV BLD: 11 FL — HIGH (ref 7.4–10.4)
POTASSIUM SERPL-MCNC: 4 MMOL/L — SIGNIFICANT CHANGE UP (ref 3.5–5)
POTASSIUM SERPL-SCNC: 4 MMOL/L — SIGNIFICANT CHANGE UP (ref 3.5–5)
RBC # BLD: 2.83 M/UL — LOW (ref 4.2–5.4)
RBC # FLD: 13.5 % — SIGNIFICANT CHANGE UP (ref 11.5–14.5)
SODIUM SERPL-SCNC: 135 MMOL/L — SIGNIFICANT CHANGE UP (ref 135–146)
WBC # BLD: 10.66 K/UL — SIGNIFICANT CHANGE UP (ref 4.8–10.8)
WBC # FLD AUTO: 10.66 K/UL — SIGNIFICANT CHANGE UP (ref 4.8–10.8)

## 2023-09-09 PROCEDURE — 99232 SBSQ HOSP IP/OBS MODERATE 35: CPT | Mod: GC

## 2023-09-09 PROCEDURE — 99221 1ST HOSP IP/OBS SF/LOW 40: CPT

## 2023-09-09 RX ORDER — MAGNESIUM SULFATE 500 MG/ML
1 VIAL (ML) INJECTION ONCE
Refills: 0 | Status: COMPLETED | OUTPATIENT
Start: 2023-09-09 | End: 2023-09-09

## 2023-09-09 RX ADMIN — Medication 100 MILLIGRAM(S): at 14:34

## 2023-09-09 RX ADMIN — CEFEPIME 100 MILLIGRAM(S): 1 INJECTION, POWDER, FOR SOLUTION INTRAMUSCULAR; INTRAVENOUS at 17:50

## 2023-09-09 RX ADMIN — Medication 100 GRAM(S): at 17:10

## 2023-09-09 RX ADMIN — SODIUM CHLORIDE 50 MILLILITER(S): 9 INJECTION, SOLUTION INTRAVENOUS at 20:46

## 2023-09-09 RX ADMIN — Medication 100 MILLIGRAM(S): at 06:22

## 2023-09-09 RX ADMIN — Medication 100 MILLIGRAM(S): at 22:04

## 2023-09-09 RX ADMIN — HEPARIN SODIUM 5000 UNIT(S): 5000 INJECTION INTRAVENOUS; SUBCUTANEOUS at 06:23

## 2023-09-09 RX ADMIN — CEFEPIME 100 MILLIGRAM(S): 1 INJECTION, POWDER, FOR SOLUTION INTRAMUSCULAR; INTRAVENOUS at 06:22

## 2023-09-09 RX ADMIN — PANTOPRAZOLE SODIUM 40 MILLIGRAM(S): 20 TABLET, DELAYED RELEASE ORAL at 06:22

## 2023-09-09 NOTE — PROGRESS NOTE ADULT - ASSESSMENT
57-year-old female past medical history of asthma, hypertension, osteomyelitis of left ankle (not on Ab), prediabetes presented for 7-10 day history of difficulty urinating, found to have urosepsis.     1. Sepsis secondary to Acute UTI (ascending infection)  + Dental carries + abscess adjacent to R mandible  - Cefepime.  flagyl   - ID recs noted  - Orthopeding following for LE wound  - Dental consult placed  - follow up cultures      2. DILIP   - resolving   - trial of void  - bladder scan  - lower LR to 50 cc per hour, may discontinue in am  - Miralax daily to prevent constipation     3. Osteomyelitis of R ankle  planned OR 9/13  preop labs     4. HTN  - on amlodipine, hold     5. Asthma = not in exacerbation     DIET: soft  diet   GI Prophylaxis: protonix  DVT prophylaxis: heparin  DISPO: acute. following Dental eval

## 2023-09-09 NOTE — PROGRESS NOTE ADULT - ASSESSMENT
PHYSICAL EXAM:    GENERAL:   ( x ) NAD, lying in bed comfortably     (  ) obtunded     (  ) lethargic     (  ) somnolent    HEAD:   ( x ) Atraumatic     (  ) hematoma     (  ) laceration (specify location:       )     NECK:  ( x ) Supple     (  ) neck stiffness     (  ) nuchal rigidity     (  )  no JVD     (  ) JVD present ( -- cm)    HEART:  Rate -->     ( x ) normal rate     (  ) bradycardic     (  ) tachycardic  Rhythm -->     ( x ) regular     (  ) regularly irregular     (  ) irregularly irregular  Murmurs -->     ( x ) normal s1s2     (  ) systolic murmur     (  ) diastolic murmur     (  ) continuous murmur      (  ) S3 present     (  ) S4 present    LUNGS:   ( x )Unlabored respirations     (  ) tachypnea  (  ) B/L air entry     (  ) decreased breath sounds in:  (location     )    (  ) no adventitious sound     (  ) crackles     (  ) wheezing      (  ) rhonchi      (specify location:       )  (  ) chest wall tenderness (specify location:       )    ABDOMEN:   ( x ) Soft     (  ) tense   |   (  ) nondistended     (  ) distended   |   (  ) +BS     (  ) hypoactive bowel sounds     (  ) hyperactive bowel sounds  (  ) nontender     (  ) RUQ tenderness     (  ) RLQ tenderness     (  ) LLQ tenderness     (  ) epigastric tenderness     (  ) diffuse tenderness  (  ) Splenomegaly      (  ) Hepatomegaly      (  ) Jaundice     (  ) ecchymosis     EXTREMITIES:  ( x ) Normal     (  ) Rash     (  ) ecchymosis     (  ) varicose veins      (  ) pitting edema     (  ) non-pitting edema   (  ) ulceration     (  ) gangrene:     (location:     )    NERVOUS SYSTEM:    ( x ) A&Ox3     (  ) confused     (  ) lethargic  CN II-XII:     (  ) Intact     (  ) deficits found     (Specify:     )   Upper extremities:     (  ) no sensorimotor deficits     (  ) weakness     (  ) loss of proprioception/vibration     (  ) loss of touch/temperature (specify:    )  Lower extremities:     (  ) no sensorimotor deficits     (  ) weakness     (  ) loss of proprioception/vibration     (  ) loss of touch/temperature (specify:    )    SKIN:   ( x ) No rashes or lesions     (  ) maculopapular rash     (  ) pustules     (  ) vesicles     (  ) ulcer     (  ) ecchymosis     (specify location:     )    ( x ) Indwelling Delong Catheter:   Date insterted:  Discontinued (9/9) and trying a trial of void   Reason (  ) Critical illness     (  ) urinary retention    (  ) Accurate Ins/Outs Monitoring     (  ) CMO patient          PRIMARY DIAGNOSIS:  57-year-old female past medical history of asthma, hypertension, osteomyelitis of left ankle (not on Ab), prediabetes presented for 7-10 day history of difficulty urinating, found to have urosepsis.     ASSESSMENT:  ·	Sepsis 2/2 to Acute UTI (ascending infection) / Dental Caries / Abscess R mandible  ·	DILIP  ·	Osteomyelitis of R ankle    PLAN:  1. Sepsis secondary to Acute UTI (ascending infection)  + Dental carries + abscess adjacent to R mandible  - Cefepime.  flagyl   - ID recs noted  - Orthopeding following for LE wound  - Dental consult placed  - follow up cultures    2. DILIP   - resolving   - trial of void  - bladder scan  - lower LR to 50 cc per hour, may discontinue in am  - Miralax daily to prevent constipation     3. Osteomyelitis of R ankle  planned OR 9/13  preop labs     4. HTN  - on amlodipine, hold     5. Asthma = not in exacerbation     Code status:  DVT prophylaxis:  Lines:  IV fluids:  O2 support:  Antibiotics:  Feeds:  Bowel Regimen:  Bowel Movement:   Urine Output:  Activity:  Disposition:     PROGRESS:  ·	Discontinued the delong's  ·	Starting the trial of void     TO FOLLOW UP:  ·	Dental evaluation  ·	Ortho intervention

## 2023-09-10 PROCEDURE — 99233 SBSQ HOSP IP/OBS HIGH 50: CPT

## 2023-09-10 RX ADMIN — ONDANSETRON 4 MILLIGRAM(S): 8 TABLET, FILM COATED ORAL at 18:08

## 2023-09-10 RX ADMIN — PANTOPRAZOLE SODIUM 40 MILLIGRAM(S): 20 TABLET, DELAYED RELEASE ORAL at 06:44

## 2023-09-10 RX ADMIN — HEPARIN SODIUM 5000 UNIT(S): 5000 INJECTION INTRAVENOUS; SUBCUTANEOUS at 06:17

## 2023-09-10 RX ADMIN — CEFEPIME 100 MILLIGRAM(S): 1 INJECTION, POWDER, FOR SOLUTION INTRAMUSCULAR; INTRAVENOUS at 05:55

## 2023-09-10 RX ADMIN — HEPARIN SODIUM 5000 UNIT(S): 5000 INJECTION INTRAVENOUS; SUBCUTANEOUS at 18:14

## 2023-09-10 RX ADMIN — CEFEPIME 100 MILLIGRAM(S): 1 INJECTION, POWDER, FOR SOLUTION INTRAMUSCULAR; INTRAVENOUS at 18:11

## 2023-09-10 RX ADMIN — Medication 100 MILLIGRAM(S): at 21:37

## 2023-09-10 RX ADMIN — Medication 100 MILLIGRAM(S): at 13:32

## 2023-09-10 RX ADMIN — Medication 100 MILLIGRAM(S): at 05:56

## 2023-09-10 NOTE — DIETITIAN INITIAL EVALUATION ADULT - PERTINENT LABORATORY DATA
09-09    135  |  106  |  27<H>  ----------------------------<  79  4.0   |  17  |  1.2    Ca    8.5      09 Sep 2023 07:41  Mg     1.7     09-09

## 2023-09-10 NOTE — PROGRESS NOTE ADULT - ASSESSMENT
Orthopaedic Surgery Progress Note    S&E this AM. Pain well controlled. Denies fever/chills/CP/SOB. No other complaints      T(C): 36.6 (09-10-23 @ 05:32), Max: 36.6 (09-10-23 @ 05:32)  HR: 78 (09-10-23 @ 05:32) (70 - 78)  BP: 100/66 (09-10-23 @ 05:32) (100/66 - 110/59)  RR: 18 (09-10-23 @ 05:32) (18 - 18)  SpO2: 97% (09-10-23 @ 05:32) (97% - 97%)    P/E:  AOx3, NAD  Non-labored breathing    LLE  1 cm draining sinus of lateral aspect of ankle  Rest of surgical incision intact  No erythema  No fluctuance  ROM ankle limited by pain  TTP over ankle  Compartments soft and compressible, no pain w/ passive stretch of digits  SILT SP/DP/T/Sural/Saph  Firing TA/EHL/FHL/GS  DP pulse 2+, cap refill <2  Labs                        9.0    10.66 )-----------( 356      ( 09 Sep 2023 07:41 )             26.6     09-09    135  |  106  |  27<H>  ----------------------------<  79  4.0   |  17  |  1.2    Ca    8.5      09 Sep 2023 07:41  Mg     1.7     09-09    Imaging:  XR L ankle  hardware intact, lucency around screws, fibula fracture at proximal end of plate    A/P:  58yo Female w/ left ankle infected hardware s/p L ankle ORIF on 8/2022    Admitted for urosepsis  Discussed with Dr. Simmons that patient will be booked for left ankle I&D, hardware removal, antibiotic bead placement on 9/13  PLEASE WRITE MEDICAL RISK STRATIFICATION NOTE WHEN PT IS OPTIMIZED FOR SURGERY  F/u dental consult for caries and abscess  NWB LLE  Pain control  Ice/elevation  Preop labs: bmp, cbc, coags, type and screen x2, ekg, cxr  NPO at midnight on 9/13 with IV fluids  DVT ppx:  SCD, per primary team         Orthopaedic Surgery Progress Note    S&E this AM. Pain well controlled. Denies fever/chills/CP/SOB. No other complaints      T(C): 36.6 (09-10-23 @ 05:32), Max: 36.6 (09-10-23 @ 05:32)  HR: 78 (09-10-23 @ 05:32) (70 - 78)  BP: 100/66 (09-10-23 @ 05:32) (100/66 - 110/59)  RR: 18 (09-10-23 @ 05:32) (18 - 18)  SpO2: 97% (09-10-23 @ 05:32) (97% - 97%)    P/E:  AOx3, NAD  Non-labored breathing    LLE  1 cm draining sinus of lateral aspect of ankle  Rest of surgical incision intact  No erythema  No fluctuance  ROM ankle limited by pain  TTP over ankle  Compartments soft and compressible, no pain w/ passive stretch of digits  SILT SP/DP/T/Sural/Saph  Firing TA/EHL/FHL/GS  DP pulse 2+, cap refill <2  Labs                        9.0    10.66 )-----------( 356      ( 09 Sep 2023 07:41 )             26.6     09-09    135  |  106  |  27<H>  ----------------------------<  79  4.0   |  17  |  1.2    Ca    8.5      09 Sep 2023 07:41  Mg     1.7     09-09    Imaging:  XR L ankle  hardware intact, lucency around screws, fibula fracture at proximal end of plate    A/P:  56yo Female w/ left ankle infected hardware s/p L ankle ORIF on 8/2022    Admitted for urosepsis  Discussed with Dr. Simmons that patient will be booked for left ankle I&D, hardware removal, antibiotic bead placement on 9/12  PLEASE WRITE MEDICAL RISK STRATIFICATION NOTE WHEN PT IS OPTIMIZED FOR SURGERY  F/u dental consult for caries and abscess  NWB LLE  Pain control  Ice/elevation  Preop labs: bmp, cbc, coags, type and screen x2, ekg, cxr  NPO at midnight on 9/13 with IV fluids  DVT ppx:  SCD, per primary team

## 2023-09-10 NOTE — DIETITIAN INITIAL EVALUATION ADULT - OTHER CALCULATIONS
Estimated Calorie Needs: [id number] x AF 1.2-1.3=1198-1297kcal/day   Estimated Protein Needs: 40-50grams/day (0.8-1grams/kg of admit weight) -Due to acute renal failure   Estimated Fluid Needs: 1198-1297mL/day (1mL/kcal)

## 2023-09-10 NOTE — DIETITIAN INITIAL EVALUATION ADULT - PERTINENT MEDS FT
MEDICATIONS  (STANDING):  cefepime   IVPB      cefepime   IVPB 1000 milliGRAM(s) IV Intermittent every 12 hours  heparin   Injectable 5000 Unit(s) SubCutaneous every 12 hours  influenza   Vaccine 0.5 milliLiter(s) IntraMuscular once  metroNIDAZOLE  IVPB 500 milliGRAM(s) IV Intermittent every 8 hours  metroNIDAZOLE  IVPB      pantoprazole    Tablet 40 milliGRAM(s) Oral before breakfast  polyethylene glycol 3350 17 Gram(s) Oral daily    MEDICATIONS  (PRN):  acetaminophen     Tablet .. 650 milliGRAM(s) Oral every 6 hours PRN Temp greater or equal to 38C (100.4F), Mild Pain (1 - 3)  albuterol    90 MICROgram(s) HFA Inhaler 2 Puff(s) Inhalation every 6 hours PRN Shortness of Breath and/or Wheezing  benzocaine 20% Spray 1 Spray(s) Topical three times a day PRN throat pain  ondansetron Injectable 4 milliGRAM(s) IV Push every 6 hours PRN Nausea and/or Vomiting

## 2023-09-10 NOTE — DIETITIAN INITIAL EVALUATION ADULT - NAME AND PHONE
Intervention: 1.Meals and Snacks   Monitor/Evaluate: Diet order, energy intake, nutrition focused physical findings, Mg, renal and anemia profile  Goal: 1.Continue to consume/tolerate 75%-100% of meals in 7-10 days (Low Risk)

## 2023-09-10 NOTE — DIETITIAN INITIAL EVALUATION ADULT - ORAL INTAKE PTA/DIET HISTORY
The patient reports following a regular diet at home; consumed three meals at >75%; denies MVI. NKFA

## 2023-09-10 NOTE — DIETITIAN INITIAL EVALUATION ADULT - NSFNSGIIOFT_GEN_A_CORE
Dx: 58y/o female with h/o asthma, hypertension, osteomyelitis of left ankle (not on Ab) and prediabetes, presented for 7-10 day history of difficulty urinating and found to have urosepsis. Noted to have sepsis POA secondary to acute UTI, dental carries, abscess adjacent to right mandible, left ankle OM with chronic sinus draining tract, DILIP secondary to obstructive uropathy and acute urinary retention (delong catheter is in place).

## 2023-09-10 NOTE — PROGRESS NOTE ADULT - ASSESSMENT
57-year-old female past medical history of asthma, hypertension, osteomyelitis of left ankle (not on Ab), prediabetes presented for 7-10 day history of difficulty urinating, found to have urosepsis.       Sepsis POA secondary to Acute UTI   Dental carries, Abscess adjacent to R mandible  - CT Neck Soft Tissue w/ IV Cont (09.07.23 @ 16:54): 1.  Multiple dental caries and periapical lucencies. Apparent 3 mm rim-enhancing fluid collection overlying the right  mandibular alveolar ridge, potentially a tiny abscess (ser 4 im 87).   - c/w cefepime and flagyl   - f/u urine culture   - Dental consult in AM     Left Aknle OM with chronic sinus draining tract  - Xray Foot AP + Lateral + Oblique, Left (09.08.23 @ 13:54): The patient is status post screw and plate fixation of the distal fibula.  There is a subacute or chronic minimally displaced incompletely healed  fracture of the fibula at the proximal margin of the hardware,  demonstrating incomplete bony bridging/callus formation. The hardware is   intact. There is no radiographic evidence of osteomyelitis. No acute fracture is  seen. There is no dislocation.There is no definite ankle mortise  asymmetry. Osteopenia is noted. There is evidence of skin ulceration over the  lateral malleolus/fibular plate.  - followed by Ortho - recommended for removal of hardware and prolonged antibiotic course - scheduled on 9/13  - Cefepime and flagyl per ID      DILIP 2/2 obstructive uropathy  Acute urinary retention   - failed TOV 9/9  - c/w ISLAS     HTN  - on amlodipine, hold     Asthma = not in exacerbation        DVT prophylaxis: heparin      Pending:  recommended for removal of hardware   - scheduled on 9/13  Plan of care d/w patient   Dispo: TBD

## 2023-09-11 ENCOUNTER — APPOINTMENT (OUTPATIENT)
Dept: ORTHOPEDIC SURGERY | Facility: CLINIC | Age: 58
End: 2023-09-11

## 2023-09-11 LAB
ALBUMIN SERPL ELPH-MCNC: 3 G/DL — LOW (ref 3.5–5.2)
ALP SERPL-CCNC: 70 U/L — SIGNIFICANT CHANGE UP (ref 30–115)
ALT FLD-CCNC: 7 U/L — SIGNIFICANT CHANGE UP (ref 0–41)
ANION GAP SERPL CALC-SCNC: 13 MMOL/L — SIGNIFICANT CHANGE UP (ref 7–14)
AST SERPL-CCNC: 11 U/L — SIGNIFICANT CHANGE UP (ref 0–41)
BILIRUB SERPL-MCNC: <0.2 MG/DL — SIGNIFICANT CHANGE UP (ref 0.2–1.2)
BUN SERPL-MCNC: 8 MG/DL — LOW (ref 10–20)
CALCIUM SERPL-MCNC: 8.1 MG/DL — LOW (ref 8.4–10.5)
CHLORIDE SERPL-SCNC: 105 MMOL/L — SIGNIFICANT CHANGE UP (ref 98–110)
CO2 SERPL-SCNC: 21 MMOL/L — SIGNIFICANT CHANGE UP (ref 17–32)
CREAT SERPL-MCNC: 0.8 MG/DL — SIGNIFICANT CHANGE UP (ref 0.7–1.5)
EGFR: 86 ML/MIN/1.73M2 — SIGNIFICANT CHANGE UP
GLUCOSE SERPL-MCNC: 99 MG/DL — SIGNIFICANT CHANGE UP (ref 70–99)
HCT VFR BLD CALC: 29.3 % — LOW (ref 37–47)
HGB BLD-MCNC: 9.8 G/DL — LOW (ref 12–16)
MAGNESIUM SERPL-MCNC: 1.2 MG/DL — LOW (ref 1.8–2.4)
MCHC RBC-ENTMCNC: 31.3 PG — HIGH (ref 27–31)
MCHC RBC-ENTMCNC: 33.4 G/DL — SIGNIFICANT CHANGE UP (ref 32–37)
MCV RBC AUTO: 93.6 FL — SIGNIFICANT CHANGE UP (ref 81–99)
NRBC # BLD: 0 /100 WBCS — SIGNIFICANT CHANGE UP (ref 0–0)
PLATELET # BLD AUTO: 328 K/UL — SIGNIFICANT CHANGE UP (ref 130–400)
PMV BLD: 10.7 FL — HIGH (ref 7.4–10.4)
POTASSIUM SERPL-MCNC: 3 MMOL/L — LOW (ref 3.5–5)
POTASSIUM SERPL-SCNC: 3 MMOL/L — LOW (ref 3.5–5)
PROT SERPL-MCNC: 5.4 G/DL — LOW (ref 6–8)
RBC # BLD: 3.13 M/UL — LOW (ref 4.2–5.4)
RBC # FLD: 13.9 % — SIGNIFICANT CHANGE UP (ref 11.5–14.5)
SODIUM SERPL-SCNC: 139 MMOL/L — SIGNIFICANT CHANGE UP (ref 135–146)
WBC # BLD: 10.92 K/UL — HIGH (ref 4.8–10.8)
WBC # FLD AUTO: 10.92 K/UL — HIGH (ref 4.8–10.8)

## 2023-09-11 PROCEDURE — 99232 SBSQ HOSP IP/OBS MODERATE 35: CPT

## 2023-09-11 RX ORDER — SODIUM CHLORIDE 9 MG/ML
1000 INJECTION, SOLUTION INTRAVENOUS
Refills: 0 | Status: DISCONTINUED | OUTPATIENT
Start: 2023-09-11 | End: 2023-09-14

## 2023-09-11 RX ORDER — MAGNESIUM SULFATE 500 MG/ML
1 VIAL (ML) INJECTION ONCE
Refills: 0 | Status: COMPLETED | OUTPATIENT
Start: 2023-09-11 | End: 2023-09-11

## 2023-09-11 RX ADMIN — Medication 100 MILLIGRAM(S): at 22:02

## 2023-09-11 RX ADMIN — HEPARIN SODIUM 5000 UNIT(S): 5000 INJECTION INTRAVENOUS; SUBCUTANEOUS at 17:06

## 2023-09-11 RX ADMIN — CEFEPIME 100 MILLIGRAM(S): 1 INJECTION, POWDER, FOR SOLUTION INTRAMUSCULAR; INTRAVENOUS at 17:07

## 2023-09-11 RX ADMIN — HEPARIN SODIUM 5000 UNIT(S): 5000 INJECTION INTRAVENOUS; SUBCUTANEOUS at 05:50

## 2023-09-11 RX ADMIN — PANTOPRAZOLE SODIUM 40 MILLIGRAM(S): 20 TABLET, DELAYED RELEASE ORAL at 05:50

## 2023-09-11 RX ADMIN — Medication 100 MILLIGRAM(S): at 13:37

## 2023-09-11 RX ADMIN — CEFEPIME 100 MILLIGRAM(S): 1 INJECTION, POWDER, FOR SOLUTION INTRAMUSCULAR; INTRAVENOUS at 05:42

## 2023-09-11 RX ADMIN — Medication 100 MILLIGRAM(S): at 05:50

## 2023-09-11 RX ADMIN — Medication 650 MILLIGRAM(S): at 13:24

## 2023-09-11 RX ADMIN — Medication 100 GRAM(S): at 18:39

## 2023-09-11 NOTE — PROGRESS NOTE ADULT - ASSESSMENT
57-year-old female past medical history of asthma, hypertension, osteomyelitis of left ankle (not on Ab), prediabetes presented for 7-10 day history of difficulty urinating, found to have DILIP, urinary retention.       Sepsis POA secondary to Acute UTI   Dental carries, Abscess adjacent to R mandible  - CT Neck Soft Tissue w/ IV Cont (09.07.23 @ 16:54): 1.  Multiple dental caries and periapical lucencies. Apparent 3 mm rim-enhancing fluid collection overlying the right  mandibular alveolar ridge, potentially a tiny abscess (ser 4 im 87).   - c/w cefepime and flagyl   - f/u urine culture - NGTD   - Dental consult      Left Ankle OM with chronic sinus draining tract  - X-ay Foot AP + Lateral + Oblique, Left (09.08.23 @ 13:54): The patient is status post screw and plate fixation of the distal fibula.  There is a subacute or chronic minimally displaced incompletely healed  fracture of the fibula at the proximal margin of the hardware,  demonstrating incomplete bony bridging/callus formation. The hardware is   intact. There is no radiographic evidence of osteomyelitis. No acute fracture is  seen. There is no dislocation. here is no definite ankle mortise  asymmetry. Osteopenia is noted. There is evidence of skin ulceration over the  lateral malleolus/fibular plate.  - followed by Ortho - recommended for removal of hardware and prolonged antibiotic course - scheduled on 9/12  - patient is medically optimized for planned procedure   - she's moderate cardiac risk for moderate risk procedure   - Denies angina, SOB. METS limited due to pain from ankle infection of hardware   - Cefepime and flagyl per ID      DILIP 2/2 obstructive uropathy  Acute urinary retention   - failed TOV 9/9  - c/w ISLAS   - TOV once ambulating post op     HTN  - on amlodipine, hold due to normotension     Asthma   -USHA PRN      DVT prophylaxis: heparin      Pending: removal of hardware   - scheduled on 9/12  Plan of care d/w patient   Dispo: TBD

## 2023-09-11 NOTE — CONSULT NOTE ADULT - SUBJECTIVE AND OBJECTIVE BOX
Patient is a 57y old  Female who presents with a chief complaint of urinary symptoms (11 Sep 2023 11:00), and presents to dental for a consult regarding an abscess seen in the mandible seen in a CT scan.       HPI: Patient was admitted for sepsis on September 8th 2023.       PAST MEDICAL & SURGICAL HISTORY:    (   ) heart valve replacement  (   ) joint replacement  (   ) pregnancy    MEDICATIONS  (STANDING):  cefepime   IVPB      cefepime   IVPB 1000 milliGRAM(s) IV Intermittent every 12 hours  heparin   Injectable 5000 Unit(s) SubCutaneous every 12 hours  influenza   Vaccine 0.5 milliLiter(s) IntraMuscular once  metroNIDAZOLE  IVPB 500 milliGRAM(s) IV Intermittent every 8 hours  metroNIDAZOLE  IVPB      pantoprazole    Tablet 40 milliGRAM(s) Oral before breakfast  polyethylene glycol 3350 17 Gram(s) Oral daily    MEDICATIONS  (PRN):  acetaminophen     Tablet .. 650 milliGRAM(s) Oral every 6 hours PRN Temp greater or equal to 38C (100.4F), Mild Pain (1 - 3)  albuterol    90 MICROgram(s) HFA Inhaler 2 Puff(s) Inhalation every 6 hours PRN Shortness of Breath and/or Wheezing  benzocaine 20% Spray 1 Spray(s) Topical three times a day PRN throat pain  ondansetron Injectable 4 milliGRAM(s) IV Push every 6 hours PRN Nausea and/or Vomiting      Allergies    penicillin (Anaphylaxis)    Intolerances        FAMILY HISTORY:      *SOCIAL HISTORY: (   ) Tobacco; (   ) ETOH    *Last Dental Visit:    Vital Signs Last 24 Hrs  T(C): 36.7 (11 Sep 2023 05:11), Max: 37.2 (10 Sep 2023 14:32)  T(F): 98 (11 Sep 2023 05:11), Max: 98.9 (10 Sep 2023 14:32)  HR: 64 (11 Sep 2023 05:11) (64 - 68)  BP: 107/59 (11 Sep 2023 05:11) (91/57 - 107/59)  BP(mean): --  RR: 18 (11 Sep 2023 05:11) (18 - 18)  SpO2: 98% (10 Sep 2023 19:20) (98% - 98%)    Parameters below as of 10 Sep 2023 19:20  Patient On (Oxygen Delivery Method): room air        LABS:          WBC Count: 10.66 K/uL [4.80 - 10.80] (09-09 @ 07:41)  Platelet Count - Automated: 356 K/uL [130 - 400] (09-09 @ 07:41)          EOE:  TMJ (  - ) clicks                     (  - ) pops                     (  - ) crepitus                      Facial bones and MOM <<grossly intact>>             (  - ) trismus             ( -  ) lymphadenopathy             (  - ) swelling             (  - ) asymmetry             (  - ) palpation             (  - ) dyspnea             (  - ) dysphagia             (  - ) loss of consciousness    IOE:  multiple fractured teeth, carious lesions. Patient needs all remaining teeth extracted. NO swelling or infection present. No sign of acute infection. No pain intraorally.            hard/soft palate:  ( -  ) palatal torus, <<No pathology noted>>           tongue/FOM <<No pathology noted>>           labial/buccal mucosa <<No pathology noted>>           (-  ) percussion           (  - ) palpation           (-  ) swelling            ( -  ) abscess           ( -  ) sinus tract      *ASSESSMENT: Patient requires extraction of remaining dentition. Intraoral exam does not indicate that her intraoral condition is related to the sepsis, and does not indicate immediate dental attention. Patient needs comprehensive dental care, but no emergent work is necessary.       *PLAN: Patient to followup with outside dentist for full extractions and denture fabrication.       RECOMMENDATIONS:  2) Dental F/U with outpatient dentist for comprehensive dental care.   3) If any difficulty swallowing/breathing, fever occur, return to ER.     Leola Edwards, [id number]

## 2023-09-11 NOTE — PROGRESS NOTE ADULT - ASSESSMENT
PHYSICAL EXAM:    GENERAL:   ( x ) NAD, lying in bed comfortably     (  ) obtunded     (  ) lethargic     (  ) somnolent    HEAD:   ( x ) Atraumatic     (  ) hematoma     (  ) laceration (specify location:       )     NECK:  ( x ) Supple     (  ) neck stiffness     (  ) nuchal rigidity     (  )  no JVD     (  ) JVD present ( -- cm)    HEART:  Rate -->     ( x ) normal rate     (  ) bradycardic     (  ) tachycardic  Rhythm -->     ( x ) regular     (  ) regularly irregular     (  ) irregularly irregular  Murmurs -->     ( x ) normal s1s2     (  ) systolic murmur     (  ) diastolic murmur     (  ) continuous murmur      (  ) S3 present     (  ) S4 present    LUNGS:   ( x )Unlabored respirations     (  ) tachypnea  (  ) B/L air entry     (  ) decreased breath sounds in:  (location     )    (  ) no adventitious sound     (  ) crackles     (  ) wheezing      (  ) rhonchi      (specify location:       )  (  ) chest wall tenderness (specify location:       )    ABDOMEN:   ( x ) Soft     (  ) tense   |   (  ) nondistended     (  ) distended   |   (  ) +BS     (  ) hypoactive bowel sounds     (  ) hyperactive bowel sounds  (  ) nontender     (  ) RUQ tenderness     (  ) RLQ tenderness     (  ) LLQ tenderness     (  ) epigastric tenderness     (  ) diffuse tenderness  (  ) Splenomegaly      (  ) Hepatomegaly      (  ) Jaundice     (  ) ecchymosis     EXTREMITIES:  ( x ) Normal     (  ) Rash     (  ) ecchymosis     (  ) varicose veins      (  ) pitting edema     (  ) non-pitting edema   (  ) ulceration     (  ) gangrene:     (location:     )    NERVOUS SYSTEM:    ( x ) A&Ox3     (  ) confused     (  ) lethargic  CN II-XII:     (  ) Intact     (  ) deficits found     (Specify:     )   Upper extremities:     (  ) no sensorimotor deficits     (  ) weakness     (  ) loss of proprioception/vibration     (  ) loss of touch/temperature (specify:    )  Lower extremities:     (  ) no sensorimotor deficits     (  ) weakness     (  ) loss of proprioception/vibration     (  ) loss of touch/temperature (specify:    )    SKIN:   ( x ) No rashes or lesions     (  ) maculopapular rash     (  ) pustules     (  ) vesicles     (  ) ulcer     (  ) ecchymosis     (specify location:     )    ( x ) Indwelling Delong Catheter:   Date insterted:  Discontinued (9/9) and trying a trial of void   Reason (  ) Critical illness     (  ) urinary retention    (  ) Accurate Ins/Outs Monitoring     (  ) CMO patient          PRIMARY DIAGNOSIS:  57-year-old female past medical history of asthma, hypertension, osteomyelitis of left ankle (not on Ab), prediabetes presented for 7-10 day history of difficulty urinating, found to have urosepsis.     ASSESSMENT:  ·	Sepsis 2/2 to Acute UTI (ascending infection) / Dental Caries / Abscess R mandible  ·	DILIP  ·	Osteomyelitis of R ankle    PLAN:  ·	Sepsis secondary to Acute UTI (ascending infection)   -   - ID recs noted on Cefepime and Flagyl   - Ucx : NGTD    ·	Multiple fractured teeth, carious lesions.   - Dental medicine on board  - Patient needs all remaining teeth extracted.   - NO swelling or infection present. No sign of acute infection.   - Patient requires extraction of remaining dentition :  Dental F/U with outpatient dentist for comprehensive dental care.   - Intraoral exam does not indicate that her intraoral condition is related to the sepsis, and does not indicate immediate dental attention.    ·	DILIP secondary to obstructive uropathy (resolved)  ·	Acute urinary retention  - resolved (2.4 -> 1.2)  - trial of void failed  - c/w delong's  - plan TOV after ambulating post-op    ·	Left ankle osteomyelitis with chronic sinus draining duct      ·	HTN  - on amlodipine, held due to normal blood pressure    ·	Asthma   - Albuterol PRN    Code status: Full  DVT prophylaxis: Heparin  Lines: Delong's  IV fluids: Maintenance LR @75 ccs/hr   O2 support: None  Antibiotics: Cefepime and Flagyl  Feeds: NPO  Bowel Regimen: None  Bowel Movement: +  Urine Output: +  Activity: Bed bound  Disposition: Acute    PROGRESS:  ·	Dental evaluation done    TO FOLLOW UP:  ·	Planned OR tomorrow  ·	     PHYSICAL EXAM:    GENERAL:   ( x ) NAD, lying in bed comfortably     (  ) obtunded     (  ) lethargic     (  ) somnolent    HEAD:   ( x ) Atraumatic     (  ) hematoma     (  ) laceration (specify location:       )     NECK:  ( x ) Supple     (  ) neck stiffness     (  ) nuchal rigidity     (  )  no JVD     (  ) JVD present ( -- cm)    HEART:  Rate -->     ( x ) normal rate     (  ) bradycardic     (  ) tachycardic  Rhythm -->     ( x ) regular     (  ) regularly irregular     (  ) irregularly irregular  Murmurs -->     ( x ) normal s1s2     (  ) systolic murmur     (  ) diastolic murmur     (  ) continuous murmur      (  ) S3 present     (  ) S4 present    LUNGS:   ( x )Unlabored respirations     (  ) tachypnea  (  ) B/L air entry     (  ) decreased breath sounds in:  (location     )    (  ) no adventitious sound     (  ) crackles     (  ) wheezing      (  ) rhonchi      (specify location:       )  (  ) chest wall tenderness (specify location:       )    ABDOMEN:   ( x ) Soft     (  ) tense   |   (  ) nondistended     (  ) distended   |   (  ) +BS     (  ) hypoactive bowel sounds     (  ) hyperactive bowel sounds  (  ) nontender     (  ) RUQ tenderness     (  ) RLQ tenderness     (  ) LLQ tenderness     (  ) epigastric tenderness     (  ) diffuse tenderness  (  ) Splenomegaly      (  ) Hepatomegaly      (  ) Jaundice     (  ) ecchymosis     EXTREMITIES:  ( x ) Normal     (  ) Rash     (  ) ecchymosis     (  ) varicose veins      (  ) pitting edema     (  ) non-pitting edema   (  ) ulceration     (  ) gangrene:     (location:     )    NERVOUS SYSTEM:    ( x ) A&Ox3     (  ) confused     (  ) lethargic  CN II-XII:     (  ) Intact     (  ) deficits found     (Specify:     )   Upper extremities:     (  ) no sensorimotor deficits     (  ) weakness     (  ) loss of proprioception/vibration     (  ) loss of touch/temperature (specify:    )  Lower extremities:     (  ) no sensorimotor deficits     (  ) weakness     (  ) loss of proprioception/vibration     (  ) loss of touch/temperature (specify:    )    SKIN:   ( x ) No rashes or lesions     (  ) maculopapular rash     (  ) pustules     (  ) vesicles     (  ) ulcer     (  ) ecchymosis     (specify location:     )    ( x ) Indwelling Delong Catheter:   Date insterted:  Discontinued (9/9) and trying a trial of void : failed TOV so delong reinserted on 9/10  Reason (  ) Critical illness     (  ) urinary retention    (  ) Accurate Ins/Outs Monitoring     (  ) CMO patient    PRIMARY DIAGNOSIS:  57-year-old female past medical history of asthma, hypertension, osteomyelitis of left ankle (not on Ab), prediabetes presented for 7-10 day history of difficulty urinating, found to have urosepsis.     ASSESSMENT:      PLAN:  ·	Sepsis secondary to Acute UTI (resolved)   - ID recs noted: On Cefepime and Flagyl   - Ucx : NGTD    ·	Multiple fractured teeth, carious lesions.   - Dental medicine on board  - Patient needs all remaining teeth extracted.   - NO swelling or infection present. No sign of acute infection.   - Patient requires extraction of remaining dentition :  Dental F/U with outpatient dentist for comprehensive dental care.   - Intraoral exam does not indicate that her intraoral condition is related to the sepsis, and does not indicate immediate dental attention.    ·	DILIP secondary to obstructive uropathy (resolved)  ·	Acute urinary retention  - resolved (2.4 -> 1.2)  - trial of void failed  - c/w delong's  - plan TOV after ambulating post-op    ·	Left ankle osteomyelitis with chronic sinus draining duct  - Left ankle infected hardware s/p L ankle ORIF on 8/2022  - XR L ankle : Hardware intact, lucency around screws, fibula fracture at proximal end of plate  - Plan: Left ankle I&D, hardware removal and antibiotic bead placement.    ·	HTN  - on amlodipine, held due to normal blood pressure    ·	Asthma   - Albuterol PRN    Code status: Full  DVT prophylaxis: Heparin  Lines: Delong's  IV fluids: Maintenance LR @75 ccs/hr   O2 support: None  Antibiotics: Cefepime and Flagyl  Feeds: NPO  Bowel Regimen: None  Bowel Movement: +  Urine Output: +  Activity: Bed bound  Disposition: Acute    PROGRESS:  ·	Dental evaluation done    TO FOLLOW UP:  ·	Planned OR tomorrow  ·	     PHYSICAL EXAM:    GENERAL:   ( x ) NAD, lying in bed comfortably     (  ) obtunded     (  ) lethargic     (  ) somnolent    HEAD:   ( x ) Atraumatic     (  ) hematoma     (  ) laceration (specify location:       )     NECK:  ( x ) Supple     (  ) neck stiffness     (  ) nuchal rigidity     (  )  no JVD     (  ) JVD present ( -- cm)    HEART:  Rate -->     ( x ) normal rate     (  ) bradycardic     (  ) tachycardic  Rhythm -->     ( x ) regular     (  ) regularly irregular     (  ) irregularly irregular  Murmurs -->     ( x ) normal s1s2     (  ) systolic murmur     (  ) diastolic murmur     (  ) continuous murmur      (  ) S3 present     (  ) S4 present    LUNGS:   ( x )Unlabored respirations     (  ) tachypnea  (  ) B/L air entry     (  ) decreased breath sounds in:  (location     )    (  ) no adventitious sound     (  ) crackles     (  ) wheezing      (  ) rhonchi      (specify location:       )  (  ) chest wall tenderness (specify location:       )    ABDOMEN:   ( x ) Soft     (  ) tense   |   (  ) nondistended     (  ) distended   |   (  ) +BS     (  ) hypoactive bowel sounds     (  ) hyperactive bowel sounds  (  ) nontender     (  ) RUQ tenderness     (  ) RLQ tenderness     (  ) LLQ tenderness     (  ) epigastric tenderness     (  ) diffuse tenderness  (  ) Splenomegaly      (  ) Hepatomegaly      (  ) Jaundice     (  ) ecchymosis     EXTREMITIES:  ( x ) Normal     (  ) Rash     (  ) ecchymosis     (  ) varicose veins      (  ) pitting edema     (  ) non-pitting edema   (  ) ulceration     (  ) gangrene:     (location:     )    NERVOUS SYSTEM:    ( x ) A&Ox3     (  ) confused     (  ) lethargic  CN II-XII:     (  ) Intact     (  ) deficits found     (Specify:     )   Upper extremities:     (  ) no sensorimotor deficits     (  ) weakness     (  ) loss of proprioception/vibration     (  ) loss of touch/temperature (specify:    )  Lower extremities:     (  ) no sensorimotor deficits     (  ) weakness     (  ) loss of proprioception/vibration     (  ) loss of touch/temperature (specify:    )    SKIN:   ( x ) No rashes or lesions     (  ) maculopapular rash     (  ) pustules     (  ) vesicles     (  ) ulcer     (  ) ecchymosis     (specify location:     )    ( x ) Indwelling Delong Catheter:   Date insterted:  Discontinued (9/9) and trying a trial of void : failed TOV so delong reinserted on 9/10  Reason (  ) Critical illness     (  ) urinary retention    (  ) Accurate Ins/Outs Monitoring     (  ) CMO patient    PRIMARY DIAGNOSIS:  57-year-old female past medical history of asthma, hypertension, osteomyelitis of left ankle (not on Ab), prediabetes presented for 7-10 day history of difficulty urinating, found to have urosepsis.     ASSESSMENT:      PLAN:  ·	Sepsis secondary to Acute UTI (resolved)   - ID recs noted: On Cefepime and Flagyl   - Ucx : NGTD    ·	Multiple fractured teeth, carious lesions.   - Dental medicine on board  - Patient needs all remaining teeth extracted.   - NO swelling or infection present. No sign of acute infection.   - Patient requires extraction of remaining dentition :  Dental F/U with outpatient dentist for comprehensive dental care.   - Intraoral exam does not indicate that her intraoral condition is related to the sepsis, and does not indicate immediate dental attention.    ·	Hypomagnesemia  - Magnesium repleted    ·	DILIP secondary to obstructive uropathy (resolved)  ·	Acute urinary retention  - resolved (2.4 -> 1.2)  - trial of void failed  - c/w delong's  - plan TOV after ambulating post-op    ·	Left ankle osteomyelitis with chronic sinus draining duct  - Left ankle infected hardware s/p L ankle ORIF on 8/2022  - XR L ankle : Hardware intact, lucency around screws, fibula fracture at proximal end of plate  - Plan: Left ankle I&D, hardware removal and antibiotic bead placement.    ·	HTN  - on amlodipine, held due to normal blood pressure    ·	Asthma   - Albuterol PRN    Code status: Full  DVT prophylaxis: Heparin  Lines: Delong's  IV fluids: Maintenance LR @75 ccs/hr   O2 support: None  Antibiotics: Cefepime and Flagyl  Feeds: NPO  Bowel Regimen: None  Bowel Movement: +  Urine Output: +  Activity: Bed bound  Disposition: Acute    PROGRESS:  ·	Dental evaluation done    TO FOLLOW UP:  ·	Planned OR tomorrow  ·	Magnesium levels

## 2023-09-12 LAB
ALBUMIN SERPL ELPH-MCNC: 2.5 G/DL — LOW (ref 3.5–5.2)
ALP SERPL-CCNC: 64 U/L — SIGNIFICANT CHANGE UP (ref 30–115)
ALT FLD-CCNC: 6 U/L — SIGNIFICANT CHANGE UP (ref 0–41)
ANION GAP SERPL CALC-SCNC: 11 MMOL/L — SIGNIFICANT CHANGE UP (ref 7–14)
ANION GAP SERPL CALC-SCNC: 12 MMOL/L — SIGNIFICANT CHANGE UP (ref 7–14)
APTT BLD: 32.8 SEC — SIGNIFICANT CHANGE UP (ref 27–39.2)
AST SERPL-CCNC: 11 U/L — SIGNIFICANT CHANGE UP (ref 0–41)
BILIRUB SERPL-MCNC: <0.2 MG/DL — SIGNIFICANT CHANGE UP (ref 0.2–1.2)
BUN SERPL-MCNC: 5 MG/DL — LOW (ref 10–20)
BUN SERPL-MCNC: 6 MG/DL — LOW (ref 10–20)
CALCIUM SERPL-MCNC: 7.6 MG/DL — LOW (ref 8.4–10.5)
CALCIUM SERPL-MCNC: 7.9 MG/DL — LOW (ref 8.4–10.5)
CHLORIDE SERPL-SCNC: 102 MMOL/L — SIGNIFICANT CHANGE UP (ref 98–110)
CHLORIDE SERPL-SCNC: 104 MMOL/L — SIGNIFICANT CHANGE UP (ref 98–110)
CO2 SERPL-SCNC: 24 MMOL/L — SIGNIFICANT CHANGE UP (ref 17–32)
CO2 SERPL-SCNC: 24 MMOL/L — SIGNIFICANT CHANGE UP (ref 17–32)
CREAT SERPL-MCNC: 0.6 MG/DL — LOW (ref 0.7–1.5)
CREAT SERPL-MCNC: 0.7 MG/DL — SIGNIFICANT CHANGE UP (ref 0.7–1.5)
EGFR: 101 ML/MIN/1.73M2 — SIGNIFICANT CHANGE UP
EGFR: 105 ML/MIN/1.73M2 — SIGNIFICANT CHANGE UP
GLUCOSE SERPL-MCNC: 89 MG/DL — SIGNIFICANT CHANGE UP (ref 70–99)
GLUCOSE SERPL-MCNC: 89 MG/DL — SIGNIFICANT CHANGE UP (ref 70–99)
HCG SERPL-ACNC: 8.8 MIU/ML — SIGNIFICANT CHANGE UP
HCT VFR BLD CALC: 26.5 % — LOW (ref 37–47)
HGB BLD-MCNC: 9.1 G/DL — LOW (ref 12–16)
INR BLD: 0.95 RATIO — SIGNIFICANT CHANGE UP (ref 0.65–1.3)
MAGNESIUM SERPL-MCNC: 1.5 MG/DL — LOW (ref 1.8–2.4)
MAGNESIUM SERPL-MCNC: 1.5 MG/DL — LOW (ref 1.8–2.4)
MCHC RBC-ENTMCNC: 31.8 PG — HIGH (ref 27–31)
MCHC RBC-ENTMCNC: 34.3 G/DL — SIGNIFICANT CHANGE UP (ref 32–37)
MCV RBC AUTO: 92.7 FL — SIGNIFICANT CHANGE UP (ref 81–99)
NRBC # BLD: 0 /100 WBCS — SIGNIFICANT CHANGE UP (ref 0–0)
PLATELET # BLD AUTO: 291 K/UL — SIGNIFICANT CHANGE UP (ref 130–400)
PMV BLD: 11 FL — HIGH (ref 7.4–10.4)
POTASSIUM SERPL-MCNC: 2.9 MMOL/L — LOW (ref 3.5–5)
POTASSIUM SERPL-MCNC: 3 MMOL/L — LOW (ref 3.5–5)
POTASSIUM SERPL-SCNC: 2.9 MMOL/L — LOW (ref 3.5–5)
POTASSIUM SERPL-SCNC: 3 MMOL/L — LOW (ref 3.5–5)
PROT SERPL-MCNC: 5.1 G/DL — LOW (ref 6–8)
PROTHROM AB SERPL-ACNC: 10.8 SEC — SIGNIFICANT CHANGE UP (ref 9.95–12.87)
RBC # BLD: 2.86 M/UL — LOW (ref 4.2–5.4)
RBC # FLD: 13.8 % — SIGNIFICANT CHANGE UP (ref 11.5–14.5)
SODIUM SERPL-SCNC: 137 MMOL/L — SIGNIFICANT CHANGE UP (ref 135–146)
SODIUM SERPL-SCNC: 140 MMOL/L — SIGNIFICANT CHANGE UP (ref 135–146)
WBC # BLD: 8.85 K/UL — SIGNIFICANT CHANGE UP (ref 4.8–10.8)
WBC # FLD AUTO: 8.85 K/UL — SIGNIFICANT CHANGE UP (ref 4.8–10.8)

## 2023-09-12 PROCEDURE — 99232 SBSQ HOSP IP/OBS MODERATE 35: CPT

## 2023-09-12 PROCEDURE — 93010 ELECTROCARDIOGRAM REPORT: CPT

## 2023-09-12 RX ORDER — POTASSIUM CHLORIDE 20 MEQ
20 PACKET (EA) ORAL
Refills: 0 | Status: COMPLETED | OUTPATIENT
Start: 2023-09-12 | End: 2023-09-12

## 2023-09-12 RX ORDER — MAGNESIUM SULFATE 500 MG/ML
2 VIAL (ML) INJECTION ONCE
Refills: 0 | Status: COMPLETED | OUTPATIENT
Start: 2023-09-12 | End: 2023-09-12

## 2023-09-12 RX ORDER — LANOLIN ALCOHOL/MO/W.PET/CERES
3 CREAM (GRAM) TOPICAL AT BEDTIME
Refills: 0 | Status: DISCONTINUED | OUTPATIENT
Start: 2023-09-12 | End: 2023-09-13

## 2023-09-12 RX ORDER — POTASSIUM CHLORIDE 20 MEQ
10 PACKET (EA) ORAL ONCE
Refills: 0 | Status: DISCONTINUED | OUTPATIENT
Start: 2023-09-12 | End: 2023-09-12

## 2023-09-12 RX ORDER — MAGNESIUM SULFATE 500 MG/ML
2 VIAL (ML) INJECTION ONCE
Refills: 0 | Status: DISCONTINUED | OUTPATIENT
Start: 2023-09-12 | End: 2023-09-12

## 2023-09-12 RX ORDER — HEPARIN SODIUM 5000 [USP'U]/ML
5000 INJECTION INTRAVENOUS; SUBCUTANEOUS EVERY 12 HOURS
Refills: 0 | Status: COMPLETED | OUTPATIENT
Start: 2023-09-12 | End: 2023-09-13

## 2023-09-12 RX ORDER — POTASSIUM CHLORIDE 20 MEQ
40 PACKET (EA) ORAL EVERY 4 HOURS
Refills: 0 | Status: COMPLETED | OUTPATIENT
Start: 2023-09-12 | End: 2023-09-12

## 2023-09-12 RX ORDER — POTASSIUM CHLORIDE 20 MEQ
20 PACKET (EA) ORAL
Refills: 0 | Status: DISCONTINUED | OUTPATIENT
Start: 2023-09-12 | End: 2023-09-12

## 2023-09-12 RX ORDER — POTASSIUM CHLORIDE 20 MEQ
20 PACKET (EA) ORAL ONCE
Refills: 0 | Status: COMPLETED | OUTPATIENT
Start: 2023-09-12 | End: 2023-09-12

## 2023-09-12 RX ADMIN — PANTOPRAZOLE SODIUM 40 MILLIGRAM(S): 20 TABLET, DELAYED RELEASE ORAL at 06:49

## 2023-09-12 RX ADMIN — SODIUM CHLORIDE 75 MILLILITER(S): 9 INJECTION, SOLUTION INTRAVENOUS at 00:48

## 2023-09-12 RX ADMIN — Medication 50 MILLIEQUIVALENT(S): at 11:23

## 2023-09-12 RX ADMIN — Medication 25 GRAM(S): at 23:24

## 2023-09-12 RX ADMIN — Medication 40 MILLIEQUIVALENT(S): at 09:10

## 2023-09-12 RX ADMIN — Medication 50 MILLIEQUIVALENT(S): at 14:58

## 2023-09-12 RX ADMIN — CEFEPIME 100 MILLIGRAM(S): 1 INJECTION, POWDER, FOR SOLUTION INTRAMUSCULAR; INTRAVENOUS at 06:50

## 2023-09-12 RX ADMIN — Medication 25 GRAM(S): at 14:15

## 2023-09-12 RX ADMIN — Medication 100 MILLIGRAM(S): at 15:52

## 2023-09-12 RX ADMIN — Medication 50 MILLIEQUIVALENT(S): at 15:16

## 2023-09-12 RX ADMIN — CEFEPIME 100 MILLIGRAM(S): 1 INJECTION, POWDER, FOR SOLUTION INTRAMUSCULAR; INTRAVENOUS at 17:30

## 2023-09-12 RX ADMIN — Medication 100 MILLIGRAM(S): at 06:41

## 2023-09-12 RX ADMIN — Medication 100 MILLIGRAM(S): at 22:56

## 2023-09-12 RX ADMIN — Medication 25 GRAM(S): at 11:23

## 2023-09-12 NOTE — PRE PROCEDURE NOTE - PRE PROCEDURE EVALUATION
ORTHOPEDIC SURGERY PRE OP NOTE      Diagnosis: left ankle infected hardware    Planned Procedure: left ankle irrigation and debridement, removal of hardware    Consent: TO BE OBTAINED BY ATTENDING                   Risks/benefits/alternatives were discussed with the patient/family and they wish to proceed with surgery.       ANTICIPATED DATE OF PROCEDURE : 9/12/23  SCHEDULED CASE OR ADD-ON CASE: scheduled at 3PM    Clearances:   [x] Medicine: - patient is medically optimized for planned procedure   - she's moderate cardiac risk for moderate risk procedure     T(C): 36.4 (09-11-23 @ 20:19), Max: 36.7 (09-11-23 @ 05:11)  HR: 66 (09-11-23 @ 20:19) (63 - 66)  BP: 96/62 (09-11-23 @ 20:19) (89/54 - 107/59)  RR: 18 (09-11-23 @ 20:19) (18 - 18)  SpO2: --    Labs:                        9.8    10.92 )-----------( 328      ( 11 Sep 2023 19:19 )             29.3     09-11    139  |  105  |  8<L>  ----------------------------<  99  3.0<L>   |  21  |  0.8    Ca    8.1<L>      11 Sep 2023 19:19  Mg     1.2     09-11    TPro  5.4<L>  /  Alb  3.0<L>  /  TBili  <0.2  /  DBili  x   /  AST  11  /  ALT  7   /  AlkPhos  70  09-11      Type and Screen X 2:    [ ] x1 - pending  [ ] x2 - pending    [x]EKG:   Diagnosis Line Sinus tachycardia  Otherwise normal ECG  [x]CXR:  Upper lobe emphysema.  No consolidation, effusion or pneumothorax.      DIET: NPO after midnight  IVF: per primary team      ANTICOAGULATION STATUS ( include name of anticoagulant) :  HSQ currently held                                           A/P: Patient is a 57y y/o Female Pending left ankle I&D, ROSA on 9/12/23    [ ] NPO and IVF @ midnight  [ ] T&S x2 pending  [ ] Coags pending  [ ] Pain control/analgesia prn per primary team   [ ] Incentive Spirometry   [x] Medical risk stratification with note signed by attending  [ ] F/U Pending Labs  [ ] Notify Ortho with any questions- spectra [id number]    [x]DISCUSSED WITH PRIMARY TEAM MEMBER (name of team member: Guicho Fox  [x]Date and Time DISCUSSED WITH PRIMARY TEAM MEMBER: 9/12/23 at 2:01 AM ORTHOPEDIC SURGERY PRE OP NOTE      Diagnosis: left ankle infected hardware    Planned Procedure: left ankle irrigation and debridement, removal of hardware    Consent: TO BE OBTAINED BY ATTENDING                   Risks/benefits/alternatives were discussed with the patient/family and they wish to proceed with surgery.       ANTICIPATED DATE OF PROCEDURE : 9/12/23  SCHEDULED CASE OR ADD-ON CASE: scheduled at 3PM    Clearances:   [x] Medicine: - patient is medically optimized for planned procedure   - she's moderate cardiac risk for moderate risk procedure     T(C): 36.4 (09-11-23 @ 20:19), Max: 36.7 (09-11-23 @ 05:11)  HR: 66 (09-11-23 @ 20:19) (63 - 66)  BP: 96/62 (09-11-23 @ 20:19) (89/54 - 107/59)  RR: 18 (09-11-23 @ 20:19) (18 - 18)  SpO2: --    Labs:                        9.8    10.92 )-----------( 328      ( 11 Sep 2023 19:19 )             29.3     09-11    139  |  105  |  8<L>  ----------------------------<  99  3.0<L>   |  21  |  0.8    Ca    8.1<L>      11 Sep 2023 19:19  Mg     1.2     09-11    TPro  5.4<L>  /  Alb  3.0<L>  /  TBili  <0.2  /  DBili  x   /  AST  11  /  ALT  7   /  AlkPhos  70  09-11      Type and Screen X 2:    [ ] x1 - pending  [ ] x2 - pending    [x]EKG:   Diagnosis Line Sinus tachycardia  Otherwise normal ECG  [x]CXR:  Upper lobe emphysema.  No consolidation, effusion or pneumothorax.      DIET: NPO after midnight  IVF: per primary team      ANTICOAGULATION STATUS ( include name of anticoagulant) :  HSQ currently held                                           A/P: Patient is a 57y y/o Female Pending left ankle I&D, ROSA on 9/12/23    [ ] NPO and IVF @ midnight  [ ] T&S x2 pending  [ ] Coags pending  [ ] Pain control/analgesia prn per primary team   [ ] Incentive Spirometry   [x] Medical risk stratification with note signed by attending  [ ] F/U Pending Labs  [ ] Notify Ortho with any questions- spectra [id number]    [x]DISCUSSED WITH PRIMARY TEAM MEMBER (name of team member: Peggy Law  [x]Date and Time DISCUSSED WITH PRIMARY TEAM MEMBER: 9/12/23 at 2:10 AM

## 2023-09-12 NOTE — PRE-ANESTHESIA EVALUATION ADULT - NSANTHPMHFT_GEN_ALL_CORE
57-year-old female past medical history of asthma, hypertension, osteomyelitis of left ankle (not on Ab), prediabetes presented for 7-10 day history of difficulty urinating, found to have urosepsis, also with Left ankle infected hardware s/p L ankle ORIF on 8/2022

## 2023-09-12 NOTE — PROGRESS NOTE ADULT - ASSESSMENT
PHYSICAL EXAM:    GENERAL:   ( x ) NAD, lying in bed comfortably     (  ) obtunded     (  ) lethargic     (  ) somnolent    HEAD:   ( x ) Atraumatic     (  ) hematoma     (  ) laceration (specify location:       )     NECK:  ( x ) Supple     (  ) neck stiffness     (  ) nuchal rigidity     (  )  no JVD     (  ) JVD present ( -- cm)    HEART:  Rate -->     ( x ) normal rate     (  ) bradycardic     (  ) tachycardic  Rhythm -->     ( x ) regular     (  ) regularly irregular     (  ) irregularly irregular  Murmurs -->     ( x ) normal s1s2     (  ) systolic murmur     (  ) diastolic murmur     (  ) continuous murmur      (  ) S3 present     (  ) S4 present    LUNGS:   ( x )Unlabored respirations     (  ) tachypnea  (  ) B/L air entry     (  ) decreased breath sounds in:  (location     )    (  ) no adventitious sound     (  ) crackles     (  ) wheezing      (  ) rhonchi      (specify location:       )  (  ) chest wall tenderness (specify location:       )    ABDOMEN:   ( x ) Soft     (  ) tense   |   (  ) nondistended     (  ) distended   |   (  ) +BS     (  ) hypoactive bowel sounds     (  ) hyperactive bowel sounds  (  ) nontender     (  ) RUQ tenderness     (  ) RLQ tenderness     (  ) LLQ tenderness     (  ) epigastric tenderness     (  ) diffuse tenderness  (  ) Splenomegaly      (  ) Hepatomegaly      (  ) Jaundice     (  ) ecchymosis     EXTREMITIES:  ( x ) Normal     (  ) Rash     (  ) ecchymosis     (  ) varicose veins      (  ) pitting edema     (  ) non-pitting edema   (  ) ulceration     (  ) gangrene:     (location:     )    NERVOUS SYSTEM:    ( x ) A&Ox3     (  ) confused     (  ) lethargic  CN II-XII:     (  ) Intact     (  ) deficits found     (Specify:     )   Upper extremities:     (  ) no sensorimotor deficits     (  ) weakness     (  ) loss of proprioception/vibration     (  ) loss of touch/temperature (specify:    )  Lower extremities:     (  ) no sensorimotor deficits     (  ) weakness     (  ) loss of proprioception/vibration     (  ) loss of touch/temperature (specify:    )    SKIN:   ( x ) No rashes or lesions     (  ) maculopapular rash     (  ) pustules     (  ) vesicles     (  ) ulcer     (  ) ecchymosis     (specify location:     )    ( x ) Indwelling Delong Catheter:   Date insterted:  Discontinued (9/9) and trying a trial of void : failed TOV so delong reinserted on 9/10  Reason (  ) Critical illness     (  ) urinary retention    (  ) Accurate Ins/Outs Monitoring     (  ) CMO patient    PRIMARY DIAGNOSIS:  57-year-old female past medical history of asthma, hypertension, osteomyelitis of left ankle (not on Ab), prediabetes presented for 7-10 day history of difficulty urinating, found to have urosepsis.     ASSESSMENT:      PLAN:  ·	Sepsis secondary to Acute UTI (resolved)   - ID recs noted: On Cefepime and Flagyl   - Ucx : NGTD    ·	Multiple fractured teeth, carious lesions.   - Dental medicine on board  - Patient needs all remaining teeth extracted.   - NO swelling or infection present. No sign of acute infection.   - Patient requires extraction of remaining dentition :  Dental F/U with outpatient dentist for comprehensive dental care.   - Intraoral exam does not indicate that her intraoral condition is related to the sepsis, and does not indicate immediate dental attention.    ·	Hypomagnesemia  - Magnesium repleted    ·	DILIP secondary to obstructive uropathy (resolved)  ·	Acute urinary retention  - resolved (2.4 -> 1.2)  - trial of void failed  - c/w delong's  - plan TOV after ambulating post-op    ·	Left ankle osteomyelitis with chronic sinus draining duct  - Left ankle infected hardware s/p L ankle ORIF on 8/2022  - XR L ankle : Hardware intact, lucency around screws, fibula fracture at proximal end of plate  - Plan: Left ankle I&D, hardware removal and antibiotic bead placement.    ·	HTN  - on amlodipine, held due to normal blood pressure    ·	Asthma   - Albuterol PRN    Code status: Full  DVT prophylaxis: Heparin  Lines: Delogn's  IV fluids: Maintenance LR @75 ccs/hr   O2 support: None  Antibiotics: Cefepime and Flagyl  Feeds: NPO  Bowel Regimen: None  Bowel Movement: +  Urine Output: +  Activity: Bed bound  Disposition: Acute    PROGRESS:  ·	OR today    TO FOLLOW UP:  ·	Follow after OR  ·	Magnesium levels  ·	Potassium levels

## 2023-09-12 NOTE — PROGRESS NOTE ADULT - ATTENDING COMMENTS
57-year-old female past medical history of asthma, hypertension, osteomyelitis of left ankle (not on Ab), prediabetes presented for 7-10 day history of difficulty urinating, found to have DILIP, urinary retention.       Sepsis POA secondary to Acute UTI   Dental carries, Abscess adjacent to R mandible  - CT Neck Soft Tissue w/ IV Cont (09.07.23 @ 16:54): 1.  Multiple dental caries and periapical lucencies. Apparent 3 mm rim-enhancing fluid collection overlying the right  mandibular alveolar ridge, potentially a tiny abscess (ser 4 im 87).   - c/w cefepime and flagyl   - f/u urine culture - NGTD   - Dental consult  appreciated     Left Ankle OM with chronic sinus draining tract  - X-ay Foot AP + Lateral + Oblique, Left (09.08.23 @ 13:54): The patient is status post screw and plate fixation of the distal fibula.  There is a subacute or chronic minimally displaced incompletely healed  fracture of the fibula at the proximal margin of the hardware,  demonstrating incomplete bony bridging/callus formation. The hardware is   intact. There is no radiographic evidence of osteomyelitis. No acute fracture is  seen. There is no dislocation. here is no definite ankle mortise  asymmetry. Osteopenia is noted. There is evidence of skin ulceration over the  lateral malleolus/fibular plate.  - followed by Ortho - left ankle irrigation and debridement, removal of hardware today   - Cefepime and flagyl per ID - will need PICC line     Hypokalemia   Magnesium deficiency   - replete and repeat      DILIP 2/2 obstructive uropathy  Acute urinary retention   - failed TOV 9/9  - c/w ISLAS   - TOV once ambulating post op     HTN  - on amlodipine at home, hold due to normotension     Asthma   -USHA PRN      DVT prophylaxis: heparin      Pending:  left ankle irrigation and debridement, removal of hardware, PICC line   Plan of care d/w patient   Dispo: TBD, PT consult post op 57-year-old female past medical history of asthma, hypertension, osteomyelitis of left ankle (not on Ab), prediabetes presented for 7-10 day history of difficulty urinating, found to have DILIP, urinary retention.       Sepsis POA secondary to Acute UTI   Dental carries, Abscess adjacent to R mandible  - CT Neck Soft Tissue w/ IV Cont (09.07.23 @ 16:54): 1.  Multiple dental caries and periapical lucencies. Apparent 3 mm rim-enhancing fluid collection overlying the right  mandibular alveolar ridge, potentially a tiny abscess (ser 4 im 87).   - c/w cefepime and flagyl   - f/u urine culture - NGTD   - Dental consult  appreciated     Left Ankle OM with chronic sinus draining tract  - X-ay Foot AP + Lateral + Oblique, Left (09.08.23 @ 13:54): The patient is status post screw and plate fixation of the distal fibula.  There is a subacute or chronic minimally displaced incompletely healed  fracture of the fibula at the proximal margin of the hardware,  demonstrating incomplete bony bridging/callus formation. The hardware is   intact. There is no radiographic evidence of osteomyelitis. No acute fracture is  seen. There is no dislocation. here is no definite ankle mortise  asymmetry. Osteopenia is noted. There is evidence of skin ulceration over the  lateral malleolus/fibular plate.  - followed by Ortho - left ankle irrigation and debridement, removal of hardware today   - Cefepime and flagyl per ID - will need PICC line     Hypokalemia   Magnesium deficiency   - replete and repeat     Anemia  - appears to be chronic in nature   - initial value hemoconcentrated   - outpatient w/u      DILIP 2/2 obstructive uropathy  Acute urinary retention   - failed TOV 9/9  - c/w ISLAS   - TOV once ambulating post op     HTN  - on amlodipine at home, hold due to normotension     Asthma   -USHA PRN      DVT prophylaxis: heparin      Pending:  left ankle irrigation and debridement, removal of hardware, PICC line   Plan of care d/w patient   Dispo: TBD, PT consult post op

## 2023-09-12 NOTE — PROGRESS NOTE ADULT - ASSESSMENT
ASSESSMENT  57-year-old female past medical history of asthma, hypertension, osteomyelitis of left ankle (not on Ab), prediabetes presented for 7-10 day history of difficulty urinating.    IMPRESSION  #Ascending UTI - Urine Cx NG    #Dental infection with small abscess   - CT Neck Soft Tissue w/ IV Cont (09.07.23 @ 16:54): 1.  Multiple dental caries and periapical lucencies. Recommend follow-up  with dental exam. 2.  Apparent 3 mm rim-enhancing fluid collection overlying the right  mandibular alveolar ridge, potentially a tiny abscess (ser 4 im 87). 3.  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 4.  Scattered groundglass opacities and tree-in-bud nodules. Etiology  favors infection/inflammation.    #OM of left ankle with chroic sinus draining wound   - followed by Ortho - recommended for removal ofhardware and prolonged antibiotic course - scheduled on 9/13  - Xray Foot AP + Lateral + Oblique, Left (09.08.23 @ 13:54): The patient is status post screw and plate fixation of the distal fibula.  There is a subacute or chronic minimally displaced incompletely healed  fracture of the fibula at the proximal margin of the hardware,  demonstrating incomplete bony bridging/callus formation. The hardware is   intact. There is no radiographic evidence of osteomyelitis. No acute fracture is  seen. There is no dislocation.There is no definite ankle mortise  asymmetry. Osteopenia is noted. There is evidence of skin ulceration over the  lateral malleolus/fibular plate.    #Abx allergy: penicillin (Anaphylaxis)    RECOMMENDATIONS  - planned for OR today -- ongoing drainage -- will follow-up intra-op cultures   - continue cefepime 1g q 12 hours   - continue flagyl 500 mg q 8 hours   - will need PICC line antibiotics eventually     Please call or message on [employer] Teams if with any questions.  Spectra [phone]

## 2023-09-13 ENCOUNTER — TRANSCRIPTION ENCOUNTER (OUTPATIENT)
Age: 58
End: 2023-09-13

## 2023-09-13 LAB
ANION GAP SERPL CALC-SCNC: 11 MMOL/L — SIGNIFICANT CHANGE UP (ref 7–14)
ANION GAP SERPL CALC-SCNC: 12 MMOL/L — SIGNIFICANT CHANGE UP (ref 7–14)
ANION GAP SERPL CALC-SCNC: 12 MMOL/L — SIGNIFICANT CHANGE UP (ref 7–14)
BUN SERPL-MCNC: 4 MG/DL — LOW (ref 10–20)
BUN SERPL-MCNC: 5 MG/DL — LOW (ref 10–20)
BUN SERPL-MCNC: 6 MG/DL — LOW (ref 10–20)
CALCIUM SERPL-MCNC: 7.6 MG/DL — LOW (ref 8.4–10.5)
CALCIUM SERPL-MCNC: 7.7 MG/DL — LOW (ref 8.4–10.4)
CALCIUM SERPL-MCNC: 7.8 MG/DL — LOW (ref 8.4–10.5)
CHLORIDE SERPL-SCNC: 104 MMOL/L — SIGNIFICANT CHANGE UP (ref 98–110)
CHLORIDE SERPL-SCNC: 106 MMOL/L — SIGNIFICANT CHANGE UP (ref 98–110)
CHLORIDE SERPL-SCNC: 106 MMOL/L — SIGNIFICANT CHANGE UP (ref 98–110)
CO2 SERPL-SCNC: 21 MMOL/L — SIGNIFICANT CHANGE UP (ref 17–32)
CO2 SERPL-SCNC: 23 MMOL/L — SIGNIFICANT CHANGE UP (ref 17–32)
CO2 SERPL-SCNC: 24 MMOL/L — SIGNIFICANT CHANGE UP (ref 17–32)
CREAT SERPL-MCNC: 0.5 MG/DL — LOW (ref 0.7–1.5)
CREAT SERPL-MCNC: 0.6 MG/DL — LOW (ref 0.7–1.5)
CREAT SERPL-MCNC: 0.7 MG/DL — SIGNIFICANT CHANGE UP (ref 0.7–1.5)
CULTURE RESULTS: SIGNIFICANT CHANGE UP
CULTURE RESULTS: SIGNIFICANT CHANGE UP
EGFR: 101 ML/MIN/1.73M2 — SIGNIFICANT CHANGE UP
EGFR: 105 ML/MIN/1.73M2 — SIGNIFICANT CHANGE UP
EGFR: 110 ML/MIN/1.73M2 — SIGNIFICANT CHANGE UP
GLUCOSE SERPL-MCNC: 112 MG/DL — HIGH (ref 70–99)
GLUCOSE SERPL-MCNC: 134 MG/DL — HIGH (ref 70–99)
GLUCOSE SERPL-MCNC: 87 MG/DL — SIGNIFICANT CHANGE UP (ref 70–99)
HCT VFR BLD CALC: 29.3 % — LOW (ref 37–47)
HGB BLD-MCNC: 9.8 G/DL — LOW (ref 12–16)
MAGNESIUM SERPL-MCNC: 1.9 MG/DL — SIGNIFICANT CHANGE UP (ref 1.8–2.4)
MAGNESIUM SERPL-MCNC: 2.5 MG/DL — HIGH (ref 1.8–2.4)
MAGNESIUM SERPL-MCNC: 3 MG/DL — HIGH (ref 1.8–2.4)
MCHC RBC-ENTMCNC: 31.5 PG — HIGH (ref 27–31)
MCHC RBC-ENTMCNC: 33.4 G/DL — SIGNIFICANT CHANGE UP (ref 32–37)
MCV RBC AUTO: 94.2 FL — SIGNIFICANT CHANGE UP (ref 81–99)
MRSA PCR RESULT.: NEGATIVE — SIGNIFICANT CHANGE UP
NRBC # BLD: 0 /100 WBCS — SIGNIFICANT CHANGE UP (ref 0–0)
PLATELET # BLD AUTO: 275 K/UL — SIGNIFICANT CHANGE UP (ref 130–400)
PMV BLD: 10.5 FL — HIGH (ref 7.4–10.4)
POTASSIUM SERPL-MCNC: 2.9 MMOL/L — LOW (ref 3.5–5)
POTASSIUM SERPL-MCNC: 3.1 MMOL/L — LOW (ref 3.5–5)
POTASSIUM SERPL-MCNC: 3.4 MMOL/L — LOW (ref 3.5–5)
POTASSIUM SERPL-SCNC: 2.9 MMOL/L — LOW (ref 3.5–5)
POTASSIUM SERPL-SCNC: 3.1 MMOL/L — LOW (ref 3.5–5)
POTASSIUM SERPL-SCNC: 3.4 MMOL/L — LOW (ref 3.5–5)
RBC # BLD: 3.11 M/UL — LOW (ref 4.2–5.4)
RBC # FLD: 13.9 % — SIGNIFICANT CHANGE UP (ref 11.5–14.5)
SODIUM SERPL-SCNC: 139 MMOL/L — SIGNIFICANT CHANGE UP (ref 135–146)
SODIUM SERPL-SCNC: 140 MMOL/L — SIGNIFICANT CHANGE UP (ref 135–146)
SODIUM SERPL-SCNC: 140 MMOL/L — SIGNIFICANT CHANGE UP (ref 135–146)
SPECIMEN SOURCE: SIGNIFICANT CHANGE UP
SPECIMEN SOURCE: SIGNIFICANT CHANGE UP
WBC # BLD: 12.93 K/UL — HIGH (ref 4.8–10.8)
WBC # FLD AUTO: 12.93 K/UL — HIGH (ref 4.8–10.8)

## 2023-09-13 PROCEDURE — 99233 SBSQ HOSP IP/OBS HIGH 50: CPT

## 2023-09-13 PROCEDURE — 27641 PARTIAL REMOVAL OF FIBULA: CPT | Mod: LT

## 2023-09-13 RX ORDER — BENZOCAINE 10 %
1 GEL (GRAM) MUCOUS MEMBRANE THREE TIMES A DAY
Refills: 0 | Status: DISCONTINUED | OUTPATIENT
Start: 2023-09-13 | End: 2023-09-19

## 2023-09-13 RX ORDER — MAGNESIUM SULFATE 500 MG/ML
1 VIAL (ML) INJECTION ONCE
Refills: 0 | Status: DISCONTINUED | OUTPATIENT
Start: 2023-09-13 | End: 2023-09-13

## 2023-09-13 RX ORDER — CEFEPIME 1 G/1
1000 INJECTION, POWDER, FOR SOLUTION INTRAMUSCULAR; INTRAVENOUS EVERY 12 HOURS
Refills: 0 | Status: DISCONTINUED | OUTPATIENT
Start: 2023-09-14 | End: 2023-09-15

## 2023-09-13 RX ORDER — ALBUTEROL 90 UG/1
2 AEROSOL, METERED ORAL EVERY 6 HOURS
Refills: 0 | Status: DISCONTINUED | OUTPATIENT
Start: 2023-09-13 | End: 2023-09-19

## 2023-09-13 RX ORDER — METRONIDAZOLE 500 MG
500 TABLET ORAL EVERY 8 HOURS
Refills: 0 | Status: DISCONTINUED | OUTPATIENT
Start: 2023-09-13 | End: 2023-09-18

## 2023-09-13 RX ORDER — POLYETHYLENE GLYCOL 3350 17 G/17G
17 POWDER, FOR SOLUTION ORAL DAILY
Refills: 0 | Status: DISCONTINUED | OUTPATIENT
Start: 2023-09-13 | End: 2023-09-19

## 2023-09-13 RX ORDER — MAGNESIUM SULFATE 500 MG/ML
2 VIAL (ML) INJECTION ONCE
Refills: 0 | Status: COMPLETED | OUTPATIENT
Start: 2023-09-13 | End: 2023-09-13

## 2023-09-13 RX ORDER — SODIUM CHLORIDE 9 MG/ML
1000 INJECTION, SOLUTION INTRAVENOUS
Refills: 0 | Status: DISCONTINUED | OUTPATIENT
Start: 2023-09-13 | End: 2023-09-13

## 2023-09-13 RX ORDER — HYDROMORPHONE HYDROCHLORIDE 2 MG/ML
0.5 INJECTION INTRAMUSCULAR; INTRAVENOUS; SUBCUTANEOUS
Refills: 0 | Status: DISCONTINUED | OUTPATIENT
Start: 2023-09-13 | End: 2023-09-14

## 2023-09-13 RX ORDER — POTASSIUM CHLORIDE 20 MEQ
20 PACKET (EA) ORAL
Refills: 0 | Status: COMPLETED | OUTPATIENT
Start: 2023-09-13 | End: 2023-09-13

## 2023-09-13 RX ORDER — ONDANSETRON 8 MG/1
4 TABLET, FILM COATED ORAL ONCE
Refills: 0 | Status: DISCONTINUED | OUTPATIENT
Start: 2023-09-13 | End: 2023-09-13

## 2023-09-13 RX ORDER — METRONIDAZOLE 500 MG
TABLET ORAL
Refills: 0 | Status: DISCONTINUED | OUTPATIENT
Start: 2023-09-13 | End: 2023-09-18

## 2023-09-13 RX ORDER — CEFEPIME 1 G/1
INJECTION, POWDER, FOR SOLUTION INTRAMUSCULAR; INTRAVENOUS
Refills: 0 | Status: DISCONTINUED | OUTPATIENT
Start: 2023-09-13 | End: 2023-09-15

## 2023-09-13 RX ORDER — POTASSIUM CHLORIDE 20 MEQ
40 PACKET (EA) ORAL ONCE
Refills: 0 | Status: COMPLETED | OUTPATIENT
Start: 2023-09-13 | End: 2023-09-13

## 2023-09-13 RX ORDER — HYDROMORPHONE HYDROCHLORIDE 2 MG/ML
1 INJECTION INTRAMUSCULAR; INTRAVENOUS; SUBCUTANEOUS
Refills: 0 | Status: DISCONTINUED | OUTPATIENT
Start: 2023-09-13 | End: 2023-09-13

## 2023-09-13 RX ORDER — ONDANSETRON 8 MG/1
4 TABLET, FILM COATED ORAL EVERY 6 HOURS
Refills: 0 | Status: DISCONTINUED | OUTPATIENT
Start: 2023-09-13 | End: 2023-09-19

## 2023-09-13 RX ORDER — PANTOPRAZOLE SODIUM 20 MG/1
40 TABLET, DELAYED RELEASE ORAL
Refills: 0 | Status: DISCONTINUED | OUTPATIENT
Start: 2023-09-13 | End: 2023-09-19

## 2023-09-13 RX ORDER — HYDROMORPHONE HYDROCHLORIDE 2 MG/ML
0.5 INJECTION INTRAMUSCULAR; INTRAVENOUS; SUBCUTANEOUS
Refills: 0 | Status: DISCONTINUED | OUTPATIENT
Start: 2023-09-13 | End: 2023-09-13

## 2023-09-13 RX ORDER — LANOLIN ALCOHOL/MO/W.PET/CERES
3 CREAM (GRAM) TOPICAL AT BEDTIME
Refills: 0 | Status: DISCONTINUED | OUTPATIENT
Start: 2023-09-13 | End: 2023-09-19

## 2023-09-13 RX ORDER — CEFEPIME 1 G/1
1000 INJECTION, POWDER, FOR SOLUTION INTRAMUSCULAR; INTRAVENOUS ONCE
Refills: 0 | Status: COMPLETED | OUTPATIENT
Start: 2023-09-13 | End: 2023-09-13

## 2023-09-13 RX ORDER — ACETAMINOPHEN 500 MG
650 TABLET ORAL EVERY 6 HOURS
Refills: 0 | Status: DISCONTINUED | OUTPATIENT
Start: 2023-09-13 | End: 2023-09-14

## 2023-09-13 RX ORDER — HYDROMORPHONE HYDROCHLORIDE 2 MG/ML
1 INJECTION INTRAMUSCULAR; INTRAVENOUS; SUBCUTANEOUS
Refills: 0 | Status: DISCONTINUED | OUTPATIENT
Start: 2023-09-13 | End: 2023-09-14

## 2023-09-13 RX ORDER — POTASSIUM CHLORIDE 20 MEQ
20 PACKET (EA) ORAL
Refills: 0 | Status: DISCONTINUED | OUTPATIENT
Start: 2023-09-13 | End: 2023-09-13

## 2023-09-13 RX ORDER — METRONIDAZOLE 500 MG
500 TABLET ORAL ONCE
Refills: 0 | Status: COMPLETED | OUTPATIENT
Start: 2023-09-13 | End: 2023-09-13

## 2023-09-13 RX ADMIN — Medication 3 MILLIGRAM(S): at 23:26

## 2023-09-13 RX ADMIN — Medication 100 MILLIGRAM(S): at 23:39

## 2023-09-13 RX ADMIN — Medication 100 MILLIGRAM(S): at 18:48

## 2023-09-13 RX ADMIN — CEFEPIME 100 MILLIGRAM(S): 1 INJECTION, POWDER, FOR SOLUTION INTRAMUSCULAR; INTRAVENOUS at 18:42

## 2023-09-13 RX ADMIN — Medication 25 GRAM(S): at 03:57

## 2023-09-13 RX ADMIN — Medication 50 MILLIEQUIVALENT(S): at 03:48

## 2023-09-13 RX ADMIN — Medication 50 MILLIEQUIVALENT(S): at 13:45

## 2023-09-13 RX ADMIN — CEFEPIME 100 MILLIGRAM(S): 1 INJECTION, POWDER, FOR SOLUTION INTRAMUSCULAR; INTRAVENOUS at 06:20

## 2023-09-13 RX ADMIN — Medication 50 MILLIEQUIVALENT(S): at 11:43

## 2023-09-13 RX ADMIN — Medication 50 MILLIEQUIVALENT(S): at 06:20

## 2023-09-13 RX ADMIN — Medication 100 MILLIGRAM(S): at 07:44

## 2023-09-13 RX ADMIN — HYDROMORPHONE HYDROCHLORIDE 1 MILLIGRAM(S): 2 INJECTION INTRAMUSCULAR; INTRAVENOUS; SUBCUTANEOUS at 23:00

## 2023-09-13 RX ADMIN — HYDROMORPHONE HYDROCHLORIDE 1 MILLIGRAM(S): 2 INJECTION INTRAMUSCULAR; INTRAVENOUS; SUBCUTANEOUS at 23:26

## 2023-09-13 RX ADMIN — Medication 100 MILLIGRAM(S): at 13:45

## 2023-09-13 RX ADMIN — Medication 40 MILLIEQUIVALENT(S): at 11:40

## 2023-09-13 NOTE — PRE-ANESTHESIA EVALUATION ADULT - NSANTHOSAYNRD_GEN_A_CORE
denies/No. TRE screening performed.  STOP BANG Legend: 0-2 = LOW Risk; 3-4 = INTERMEDIATE Risk; 5-8 = HIGH Risk
denies/No. TRE screening performed.  STOP BANG Legend: 0-2 = LOW Risk; 3-4 = INTERMEDIATE Risk; 5-8 = HIGH Risk

## 2023-09-13 NOTE — BRIEF OPERATIVE NOTE - OPERATION/FINDINGS
stable ankle, fibrous union, purulance about fibrous nonunion; syndesmosis stable; post op Abx per ID; NWB x 2 weeks then if woounds stable, WBAT in cam BOOT  Dict:   explant: [place] fibula plate and associated scrws, plus independent lag screw and washer stable ankle, fibrous union, purulence about fibrous nonunion; syndesmosis stable; post op Abx per ID; NWB x 2 weeks then if wounds stable, WBAT in cam BOOT  Dict: [id number]  explant: [place] fibula plate and associated scrws, plus independent lag screw and washer

## 2023-09-13 NOTE — BRIEF OPERATIVE NOTE - NSICDXBRIEFPOSTOP_GEN_ALL_CORE_FT
POST-OP DIAGNOSIS:  Chronic osteomyelitis of left ankle 13-Sep-2023 17:12:54 with retained orthopedic hardware and fibrous nonunion Leeroy Fernandez

## 2023-09-13 NOTE — BRIEF OPERATIVE NOTE - NSICDXBRIEFPREOP_GEN_ALL_CORE_FT
PRE-OP DIAGNOSIS:  Chronic osteomyelitis of left ankle 13-Sep-2023 17:12:25 with retained orthopedic hardware and fibrous nonunion Leeroy Fernandez

## 2023-09-13 NOTE — PROGRESS NOTE ADULT - ASSESSMENT
PHYSICAL EXAM:    GENERAL:   ( x ) NAD, lying in bed comfortably     (  ) obtunded     (  ) lethargic     (  ) somnolent    HEAD:   ( x ) Atraumatic     (  ) hematoma     (  ) laceration (specify location:       )     NECK:  ( x ) Supple     (  ) neck stiffness     (  ) nuchal rigidity     (  )  no JVD     (  ) JVD present ( -- cm)    HEART:  Rate -->     ( x ) normal rate     (  ) bradycardic     (  ) tachycardic  Rhythm -->     ( x ) regular     (  ) regularly irregular     (  ) irregularly irregular  Murmurs -->     ( x ) normal s1s2     (  ) systolic murmur     (  ) diastolic murmur     (  ) continuous murmur      (  ) S3 present     (  ) S4 present    LUNGS:   ( x )Unlabored respirations     (  ) tachypnea  (  ) B/L air entry     (  ) decreased breath sounds in:  (location     )    (  ) no adventitious sound     (  ) crackles     (  ) wheezing      (  ) rhonchi      (specify location:       )  (  ) chest wall tenderness (specify location:       )    ABDOMEN:   ( x ) Soft     (  ) tense   |   (  ) nondistended     (  ) distended   |   (  ) +BS     (  ) hypoactive bowel sounds     (  ) hyperactive bowel sounds  (  ) nontender     (  ) RUQ tenderness     (  ) RLQ tenderness     (  ) LLQ tenderness     (  ) epigastric tenderness     (  ) diffuse tenderness  (  ) Splenomegaly      (  ) Hepatomegaly      (  ) Jaundice     (  ) ecchymosis     EXTREMITIES:  ( x ) Normal     (  ) Rash     (  ) ecchymosis     (  ) varicose veins      (  ) pitting edema     (  ) non-pitting edema   (  ) ulceration     (  ) gangrene:     (location:     )    NERVOUS SYSTEM:    ( x ) A&Ox3     (  ) confused     (  ) lethargic  CN II-XII:     (  ) Intact     (  ) deficits found     (Specify:     )   Upper extremities:     (  ) no sensorimotor deficits     (  ) weakness     (  ) loss of proprioception/vibration     (  ) loss of touch/temperature (specify:    )  Lower extremities:     (  ) no sensorimotor deficits     (  ) weakness     (  ) loss of proprioception/vibration     (  ) loss of touch/temperature (specify:    )    SKIN:   ( x ) No rashes or lesions     (  ) maculopapular rash     (  ) pustules     (  ) vesicles     (  ) ulcer     (  ) ecchymosis     (specify location:     )    ( x ) Indwelling Delong Catheter:   Date inserted :  9/10  Reason (  ) Critical illness     ( x ) urinary retention    (  ) Accurate Ins/Outs Monitoring     (  ) CMO patient    PRIMARY DIAGNOSIS:  57-year-old female past medical history of asthma, hypertension, osteomyelitis of left ankle (not on Ab), prediabetes presented for 7-10 day history of difficulty urinating, found to have urosepsis.     ASSESSMENT:  # Left ankle osteomyelitis with chronic sinus draining duct  # Hypokalemia  # Hypocalcemia    PLAN:  ·	Sepsis secondary to Acute UTI (resolved)   - ID recs noted: On Cefepime and Flagyl   - Ucx : NGTD    ·	Multiple fractured teeth, carious lesions.   - Dental medicine on board  - Patient needs all remaining teeth extracted.   - NO swelling or infection present. No sign of acute infection.   - Patient requires extraction of remaining dentition :  Dental F/U with outpatient dentist for comprehensive dental care.   - Intraoral exam does not indicate that her intraoral condition is related to the sepsis, and does not indicate immediate dental attention.    ·	Hypomagnesemia resolved  - Magnesium repleted    ·	Hypokalemia (resolving)  - Repleting    ·	Hypocalcemia  - Calcium: 7.6 mg/dL (09.13.23 @ 11:47)    ·	DILIP secondary to obstructive uropathy (resolved)  ·	Acute urinary retention  - resolved (2.4 -> 1.2)  - trial of void failed so c/w delong's  - plan TOV after ambulating post-op    ·	Left ankle osteomyelitis with chronic sinus draining duct  - Left ankle infected hardware s/p L ankle ORIF on 8/2022  - XR L ankle : Hardware intact, lucency around screws, fibula fracture at proximal end of plate  - Plan: Left ankle I&D, hardware removal and antibiotic bead placement.    ·	HTN  - on amlodipine, held due to normal blood pressure    ·	Asthma   - Albuterol PRN    Code status: Full  DVT prophylaxis: Heparin  Lines: Delong's  IV fluids: Maintenance LR @75 ccs/hr   O2 support: None  Antibiotics: Cefepime and Flagyl  Feeds: NPO  Bowel Regimen: None  Bowel Movement: +  Urine Output: +  Activity: Bed bound  Disposition: Acute    PROGRESS:  ·	OR today    TO FOLLOW UP:  ·	Post-op

## 2023-09-13 NOTE — BRIEF OPERATIVE NOTE - NSICDXBRIEFPROCEDURE_GEN_ALL_CORE_FT
PROCEDURES:  Debridement of osteomyelitis 13-Sep-2023 17:08:00 fibula, CPT code CPT code 21242 Leeroy Fernandez  Removal of deep implant 13-Sep-2023 17:10:46  Leeroy Fernandez   PROCEDURES:  Debridement of osteomyelitis 13-Sep-2023 17:08:00 fibula, CPT code CPT code 08237 Leeroy Fernandez  Removal of deep implant 13-Sep-2023 17:10:46 CPT code 64095 Leeroy Fernandez  XR ankle left, stress view 13-Sep-2023 17:24:44 CPT code 46341; stable syndesmosis Leeroy Fernandez

## 2023-09-13 NOTE — PRE PROCEDURE NOTE - PRE PROCEDURE EVALUATION
Orthopaedic Surgery Preop Note      Planned procedure: LEFT ANKLE REMOVAL OF HARDWARE, I&D, APPLICATION OF ANTIBIOTIC BEADS      Consent: R/B/A discussed w/ pt/pt's family who expressed understanding and wished to proceed w/ surgery. Consent documented and signed appropriately.      Consults/clearances:   Medicine  Anesthesia      Vitals:  T(C): 36.7 (09-12-23 @ 20:46), Max: 37 (09-12-23 @ 14:26)  HR: 76 (09-12-23 @ 22:05) (62 - 76)  BP: 110/62 (09-12-23 @ 22:05) (99/61 - 132/65)  RR: 18 (09-12-23 @ 22:05) (18 - 18)  SpO2: 97% (09-12-23 @ 22:05) (96% - 97%)        NPO @ MN, IVF  CXR: see PACS  EKG: see chart  Type and screen x2: done   2 units prbc on hold  Blood bank called:    Labs                        9.1    8.85  )-----------( 291      ( 12 Sep 2023 07:35 )             26.5     09-12    140  |  104  |  6<L>  ----------------------------<  134<H>  2.9<L>   |  24  |  0.7    Ca    7.8<L>      12 Sep 2023 23:10  Mg     1.9     09-12    TPro  5.1<L>  /  Alb  2.5<L>  /  TBili  <0.2  /  DBili  x   /  AST  11  /  ALT  6   /  AlkPhos  64  09-12    LIVER FUNCTIONS - ( 12 Sep 2023 07:35 )  Alb: 2.5 g/dL / Pro: 5.1 g/dL / ALK PHOS: 64 U/L / ALT: 6 U/L / AST: 11 U/L / GGT: x           PT/INR - ( 12 Sep 2023 07:35 )   PT: 10.80 sec;   INR: 0.95 ratio         PTT - ( 12 Sep 2023 07:35 )  PTT:32.8 sec    Anticoagulation status (include name of anticaogulant):  Please hold Lovenox after midnight    A/P: Patient is a 57y y/o Female Pending left ankle I&D, ROSA, applicaiton of antibiotic beads on 9/13/23    [ ] NPO and IVF @ midnight  [ ] T&S x2 completed  [ ] Coags completed  [ ] Pain control/analgesia prn per primary team   [ ] Incentive Spirometry   [x] Medical risk stratification with note signed by attending  [ ] F/U Pending Labs  [ ] Notify Ortho with any questions- spectra [id number]    [x]DISCUSSED WITH PRIMARY TEAM MEMBER (name of team member: Peggy Law  [x]Date and Time DISCUSSED WITH PRIMARY TEAM MEMBER: 9/12/23 at 2:10 AM

## 2023-09-13 NOTE — PROGRESS NOTE ADULT - ASSESSMENT
57-year-old female past medical history of asthma, hypertension, osteomyelitis of left ankle (not on Ab), prediabetes presented for 7-10 day history of difficulty urinating, found to have DILIP, urinary retention.     A/P  # Sepsis POA secondary to Acute UTI /Dental carries/  Abscess adjacent to R mandible  - resolved now   - c/w cefepime and flagyl   - urine and blood Cx are NTD  - Dental consult  appreciated     # Left Ankle OM with chronic sinus draining tract  - consulted by ortho, scheduled for OR today (  removal of hardware)   - Cefepime and flagyl per ID - will need PICC line     # Hypokalemia  - replete potassium     # Anemia  - monitor H/H, keep Hb above 7.5  - no active bleeding noted   - if Hb drops send anemia work up      #DILIP 2/2 obstructive uropathy/ Acute urinary retention   - failed TOV 9/9  - c/w ISLAS, monitor urine output     # HTN  - DASH diet     # Asthma   -USHA PRN      DVT prophylaxis: heparin    Pending:  left ankle irrigation and debridement, removal of hardware, PICC line   Plan of care d/w patient   Dispo: TBD, PT consult post op.

## 2023-09-14 LAB
ALBUMIN SERPL ELPH-MCNC: 2.3 G/DL — LOW (ref 3.5–5.2)
ANION GAP SERPL CALC-SCNC: 8 MMOL/L — SIGNIFICANT CHANGE UP (ref 7–14)
BUN SERPL-MCNC: 5 MG/DL — LOW (ref 10–20)
CALCIUM SERPL-MCNC: 7.8 MG/DL — LOW (ref 8.4–10.5)
CHLORIDE SERPL-SCNC: 107 MMOL/L — SIGNIFICANT CHANGE UP (ref 98–110)
CO2 SERPL-SCNC: 23 MMOL/L — SIGNIFICANT CHANGE UP (ref 17–32)
CREAT SERPL-MCNC: 0.6 MG/DL — LOW (ref 0.7–1.5)
EGFR: 105 ML/MIN/1.73M2 — SIGNIFICANT CHANGE UP
GLUCOSE SERPL-MCNC: 98 MG/DL — SIGNIFICANT CHANGE UP (ref 70–99)
GRAM STN FLD: SIGNIFICANT CHANGE UP
GRAM STN FLD: SIGNIFICANT CHANGE UP
HCT VFR BLD CALC: 25.7 % — LOW (ref 37–47)
HCT VFR BLD CALC: 26.9 % — LOW (ref 37–47)
HGB BLD-MCNC: 8.5 G/DL — LOW (ref 12–16)
HGB BLD-MCNC: 8.9 G/DL — LOW (ref 12–16)
MAGNESIUM SERPL-MCNC: 1.6 MG/DL — LOW (ref 1.8–2.4)
MCHC RBC-ENTMCNC: 31.8 PG — HIGH (ref 27–31)
MCHC RBC-ENTMCNC: 31.9 PG — HIGH (ref 27–31)
MCHC RBC-ENTMCNC: 33.1 G/DL — SIGNIFICANT CHANGE UP (ref 32–37)
MCHC RBC-ENTMCNC: 33.1 G/DL — SIGNIFICANT CHANGE UP (ref 32–37)
MCV RBC AUTO: 96.3 FL — SIGNIFICANT CHANGE UP (ref 81–99)
MCV RBC AUTO: 96.4 FL — SIGNIFICANT CHANGE UP (ref 81–99)
NIGHT BLUE STAIN TISS: SIGNIFICANT CHANGE UP
NRBC # BLD: 0 /100 WBCS — SIGNIFICANT CHANGE UP (ref 0–0)
NRBC # BLD: 0 /100 WBCS — SIGNIFICANT CHANGE UP (ref 0–0)
PLATELET # BLD AUTO: 257 K/UL — SIGNIFICANT CHANGE UP (ref 130–400)
PLATELET # BLD AUTO: 264 K/UL — SIGNIFICANT CHANGE UP (ref 130–400)
PMV BLD: 10.5 FL — HIGH (ref 7.4–10.4)
PMV BLD: 10.7 FL — HIGH (ref 7.4–10.4)
POTASSIUM SERPL-MCNC: 3.9 MMOL/L — SIGNIFICANT CHANGE UP (ref 3.5–5)
POTASSIUM SERPL-SCNC: 3.9 MMOL/L — SIGNIFICANT CHANGE UP (ref 3.5–5)
RBC # BLD: 2.67 M/UL — LOW (ref 4.2–5.4)
RBC # BLD: 2.79 M/UL — LOW (ref 4.2–5.4)
RBC # FLD: 14.4 % — SIGNIFICANT CHANGE UP (ref 11.5–14.5)
RBC # FLD: 14.5 % — SIGNIFICANT CHANGE UP (ref 11.5–14.5)
SODIUM SERPL-SCNC: 138 MMOL/L — SIGNIFICANT CHANGE UP (ref 135–146)
SPECIMEN SOURCE: SIGNIFICANT CHANGE UP
WBC # BLD: 13.07 K/UL — HIGH (ref 4.8–10.8)
WBC # BLD: 13.59 K/UL — HIGH (ref 4.8–10.8)
WBC # FLD AUTO: 13.07 K/UL — HIGH (ref 4.8–10.8)
WBC # FLD AUTO: 13.59 K/UL — HIGH (ref 4.8–10.8)

## 2023-09-14 PROCEDURE — 99233 SBSQ HOSP IP/OBS HIGH 50: CPT

## 2023-09-14 RX ORDER — HEPARIN SODIUM 5000 [USP'U]/ML
5000 INJECTION INTRAVENOUS; SUBCUTANEOUS EVERY 12 HOURS
Refills: 0 | Status: DISCONTINUED | OUTPATIENT
Start: 2023-09-14 | End: 2023-09-19

## 2023-09-14 RX ORDER — ACETAMINOPHEN 500 MG
975 TABLET ORAL EVERY 8 HOURS
Refills: 0 | Status: DISCONTINUED | OUTPATIENT
Start: 2023-09-14 | End: 2023-09-19

## 2023-09-14 RX ORDER — SODIUM CHLORIDE 9 MG/ML
1000 INJECTION INTRAMUSCULAR; INTRAVENOUS; SUBCUTANEOUS
Refills: 0 | Status: DISCONTINUED | OUTPATIENT
Start: 2023-09-14 | End: 2023-09-14

## 2023-09-14 RX ORDER — HYDROMORPHONE HYDROCHLORIDE 2 MG/ML
1 INJECTION INTRAMUSCULAR; INTRAVENOUS; SUBCUTANEOUS EVERY 4 HOURS
Refills: 0 | Status: DISCONTINUED | OUTPATIENT
Start: 2023-09-14 | End: 2023-09-19

## 2023-09-14 RX ORDER — MAGNESIUM SULFATE 500 MG/ML
2 VIAL (ML) INJECTION ONCE
Refills: 0 | Status: COMPLETED | OUTPATIENT
Start: 2023-09-14 | End: 2023-09-14

## 2023-09-14 RX ORDER — SODIUM CHLORIDE 9 MG/ML
1000 INJECTION INTRAMUSCULAR; INTRAVENOUS; SUBCUTANEOUS
Refills: 0 | Status: DISCONTINUED | OUTPATIENT
Start: 2023-09-14 | End: 2023-09-19

## 2023-09-14 RX ORDER — ACETAMINOPHEN 500 MG
1000 TABLET ORAL ONCE
Refills: 0 | Status: COMPLETED | OUTPATIENT
Start: 2023-09-14 | End: 2023-09-14

## 2023-09-14 RX ORDER — SODIUM CHLORIDE 9 MG/ML
1000 INJECTION INTRAMUSCULAR; INTRAVENOUS; SUBCUTANEOUS ONCE
Refills: 0 | Status: COMPLETED | OUTPATIENT
Start: 2023-09-14 | End: 2023-09-14

## 2023-09-14 RX ADMIN — HYDROMORPHONE HYDROCHLORIDE 1 MILLIGRAM(S): 2 INJECTION INTRAMUSCULAR; INTRAVENOUS; SUBCUTANEOUS at 11:23

## 2023-09-14 RX ADMIN — PANTOPRAZOLE SODIUM 40 MILLIGRAM(S): 20 TABLET, DELAYED RELEASE ORAL at 05:56

## 2023-09-14 RX ADMIN — Medication 975 MILLIGRAM(S): at 23:00

## 2023-09-14 RX ADMIN — Medication 150 GRAM(S): at 12:43

## 2023-09-14 RX ADMIN — Medication 975 MILLIGRAM(S): at 01:40

## 2023-09-14 RX ADMIN — Medication 100 MILLIGRAM(S): at 13:04

## 2023-09-14 RX ADMIN — HYDROMORPHONE HYDROCHLORIDE 1 MILLIGRAM(S): 2 INJECTION INTRAMUSCULAR; INTRAVENOUS; SUBCUTANEOUS at 19:01

## 2023-09-14 RX ADMIN — HYDROMORPHONE HYDROCHLORIDE 1 MILLIGRAM(S): 2 INJECTION INTRAMUSCULAR; INTRAVENOUS; SUBCUTANEOUS at 05:53

## 2023-09-14 RX ADMIN — HYDROMORPHONE HYDROCHLORIDE 1 MILLIGRAM(S): 2 INJECTION INTRAMUSCULAR; INTRAVENOUS; SUBCUTANEOUS at 19:30

## 2023-09-14 RX ADMIN — Medication 975 MILLIGRAM(S): at 13:06

## 2023-09-14 RX ADMIN — Medication 1000 MILLIGRAM(S): at 09:30

## 2023-09-14 RX ADMIN — HYDROMORPHONE HYDROCHLORIDE 1 MILLIGRAM(S): 2 INJECTION INTRAMUSCULAR; INTRAVENOUS; SUBCUTANEOUS at 08:37

## 2023-09-14 RX ADMIN — CEFEPIME 100 MILLIGRAM(S): 1 INJECTION, POWDER, FOR SOLUTION INTRAMUSCULAR; INTRAVENOUS at 17:37

## 2023-09-14 RX ADMIN — SODIUM CHLORIDE 75 MILLILITER(S): 9 INJECTION INTRAMUSCULAR; INTRAVENOUS; SUBCUTANEOUS at 16:14

## 2023-09-14 RX ADMIN — Medication 100 MILLIGRAM(S): at 05:56

## 2023-09-14 RX ADMIN — Medication 975 MILLIGRAM(S): at 22:27

## 2023-09-14 RX ADMIN — SODIUM CHLORIDE 3000 MILLILITER(S): 9 INJECTION INTRAMUSCULAR; INTRAVENOUS; SUBCUTANEOUS at 15:10

## 2023-09-14 RX ADMIN — Medication 100 MILLIGRAM(S): at 22:29

## 2023-09-14 RX ADMIN — CEFEPIME 100 MILLIGRAM(S): 1 INJECTION, POWDER, FOR SOLUTION INTRAMUSCULAR; INTRAVENOUS at 05:53

## 2023-09-14 RX ADMIN — Medication 400 MILLIGRAM(S): at 08:37

## 2023-09-14 RX ADMIN — HEPARIN SODIUM 5000 UNIT(S): 5000 INJECTION INTRAVENOUS; SUBCUTANEOUS at 17:38

## 2023-09-14 RX ADMIN — Medication 3 MILLIGRAM(S): at 22:27

## 2023-09-14 NOTE — PHYSICAL THERAPY INITIAL EVALUATION ADULT - GENERAL OBSERVATIONS, REHAB EVAL
PT IE completeted. Pt encountererd semi-reclined in bed. Pt was extensively educated on NWB px L LE. pt verbalized and demonstrated understanding

## 2023-09-14 NOTE — PROGRESS NOTE ADULT - ASSESSMENT
s/p left ankle irrigation and debridement with zulma pod 1     pain control   dvt proph   medical management  oob to chair   nwb left lower extremity in splint 2 weeks minimum.   if wounds are ok will upgrade wb to wbat in cam boot   PLEASE FOLLOW UP OR CULTURES   ABX AS PER ID   UPON DISCHARGE NEEDS FOLLOW UP WITH DR YU 2 WEEKS POST OP

## 2023-09-14 NOTE — PROGRESS NOTE ADULT - ASSESSMENT
57-year-old female past medical history of asthma, hypertension, osteomyelitis of left ankle (not on Ab), prediabetes presented for 7-10 day history of difficulty urinating, found to have DILIP, urinary retention.     A/P  # Sepsis POA secondary to Acute UTI /Dental carries/  Abscess adjacent to R mandible  - resolved now   - c/w cefepime and flagyl   - urine and blood Cx are NTD  - Dental consult  appreciated, OP follow up recommended.     # Left Ankle OM with chronic sinus draining tract  - consulted by ortho, hard wear was  removed on 9/13  - Cefepime and flagyl per ID - will need PICC line, please confirm with ID, pt needs follow up     # Hypomagnesemia  - replete Mg     # Anemia  - monitor H/H, keep Hb above 7.5  - no active bleeding noted   - if Hb drops send anemia work up      #DILIP 2/2 obstructive uropathy/ Acute urinary retention   - failed TOV 9/9  - start IV hydration and TOV today after PT evaluation     # HTN  - DASH diet     # Asthma   -USHA PRN      DVT prophylaxis: heparin    Pending:  ID follow up, if IV Abx recommended for 6 weeks, call IR for a PICC line, start IV hydration, TOV today after PT evaluation, discharge planning, anticipate discharge in 24 hours   Plan of care d/w patient and her daughter   Dispo: [facility]

## 2023-09-14 NOTE — PHYSICAL THERAPY INITIAL EVALUATION ADULT - PERTINENT HX OF CURRENT PROBLEM, REHAB EVAL
57-year-old female past medical history of asthma, hypertension, osteomyelitis of left ankle (not on Ab), prediabetes presented for 7-10 day history of difficulty urinating. She reports having pain initiating the stream, and only having a few drops of urine come out at at time. No pain while urinating. No hematuria. This has been associated with 1 week of nausea/ vomiting clear liquid and inability to eat.

## 2023-09-14 NOTE — PROGRESS NOTE ADULT - ASSESSMENT
PHYSICAL EXAM:    GENERAL:   ( x ) NAD, lying in bed comfortably     (  ) obtunded     (  ) lethargic     (  ) somnolent    HEAD:   ( x ) Atraumatic     (  ) hematoma     (  ) laceration (specify location:       )     NECK:  ( x ) Supple     (  ) neck stiffness     (  ) nuchal rigidity     (  )  no JVD     (  ) JVD present ( -- cm)    HEART:  Rate -->     ( x ) normal rate     (  ) bradycardic     (  ) tachycardic  Rhythm -->     ( x ) regular     (  ) regularly irregular     (  ) irregularly irregular  Murmurs -->     ( x ) normal s1s2     (  ) systolic murmur     (  ) diastolic murmur     (  ) continuous murmur      (  ) S3 present     (  ) S4 present    LUNGS:   ( x )Unlabored respirations     (  ) tachypnea  (  ) B/L air entry     (  ) decreased breath sounds in:  (location     )    (  ) no adventitious sound     (  ) crackles     (  ) wheezing      (  ) rhonchi      (specify location:       )  (  ) chest wall tenderness (specify location:       )    ABDOMEN:   ( x ) Soft     (  ) tense   |   (  ) nondistended     (  ) distended   |   (  ) +BS     (  ) hypoactive bowel sounds     (  ) hyperactive bowel sounds  (  ) nontender     (  ) RUQ tenderness     (  ) RLQ tenderness     (  ) LLQ tenderness     (  ) epigastric tenderness     (  ) diffuse tenderness  (  ) Splenomegaly      (  ) Hepatomegaly      (  ) Jaundice     (  ) ecchymosis     EXTREMITIES:  ( x ) Normal     (  ) Rash     (  ) ecchymosis     (  ) varicose veins      (  ) pitting edema     (  ) non-pitting edema   (  ) ulceration     (  ) gangrene:     (location:     )    NERVOUS SYSTEM:    ( x ) A&Ox3     (  ) confused     (  ) lethargic  CN II-XII:     (  ) Intact     (  ) deficits found     (Specify:     )   Upper extremities:     (  ) no sensorimotor deficits     (  ) weakness     (  ) loss of proprioception/vibration     (  ) loss of touch/temperature (specify:    )  Lower extremities:     (  ) no sensorimotor deficits     (  ) weakness     (  ) loss of proprioception/vibration     (  ) loss of touch/temperature (specify:    )    SKIN:   ( x ) No rashes or lesions     (  ) maculopapular rash     (  ) pustules     (  ) vesicles     (  ) ulcer     (  ) ecchymosis     (specify location:     )    ( x ) Indwelling Delong Catheter:   Date inserted :  9/10  Reason (  ) Critical illness     ( x ) urinary retention    (  ) Accurate Ins/Outs Monitoring     (  ) CMO patient    PRIMARY DIAGNOSIS:  57-year-old female past medical history of asthma, hypertension, osteomyelitis of left ankle (not on Ab), prediabetes presented for 7-10 day history of difficulty urinating, found to have urosepsis.     ASSESSMENT:  # Left ankle osteomyelitis with chronic sinus draining duct  # Hypokalemia  # Hypocalcemia    PLAN:  ·	Sepsis secondary to Acute UTI (resolved)   - ID recs noted: On Cefepime and Flagyl   - Ucx : NGTD    ·	Multiple fractured teeth, carious lesions.   - Dental medicine on board  - Patient needs all remaining teeth extracted.   - NO swelling or infection present. No sign of acute infection.   - Patient requires extraction of remaining dentition :  Dental F/U with outpatient dentist for comprehensive dental care.   - Intraoral exam does not indicate that her intraoral condition is related to the sepsis, and does not indicate immediate dental attention.    ·	Hypomagnesemia resolved  - Magnesium repleted    ·	Hypokalemia (resolving)  - Repleting    ·	Hypocalcemia  - Calcium: 7.6 mg/dL (09.13.23 @ 11:47)    ·	DILIP secondary to obstructive uropathy (resolved)  ·	Acute urinary retention  - resolved (2.4 -> 1.2)  - trial of void failed so c/w delong's  - plan TOV after ambulating post-op    ·	Chronic osteomyelitis of left ankle with retained orthopaedic hardware   - Left ankle infected hardware s/p L ankle ORIF on 8/2022  - XR L ankle : Hardware intact, lucency around screws, fibula fracture at proximal end of plate  - L ankle I&D, removal of hardware (9/13)  - NWB LLE x 2 weeks until wound heals; then if wounds stable plan to transition to WBAT in CAM boot  ·	HTN  - on amlodipine, held due to normal blood pressure    ·	Asthma   - Albuterol PRN    Code status: Full  DVT prophylaxis: Heparin  Lines: Delong's  IV fluids:   O2 support: None  Antibiotics: Cefepime and Flagyl  Feeds: NPO  Bowel Regimen: None  Bowel Movement: +  Urine Output: +  Activity: Bed bound  Disposition: Acute    PROGRESS:  ·	    TO FOLLOW UP:  ·	PT consult  ·	Delong's  ·	Anticoagulants  ·	Calcium levels  ·	Potassium levels     PHYSICAL EXAM:    GENERAL:   ( x ) NAD, lying in bed comfortably     (  ) obtunded     (  ) lethargic     (  ) somnolent    HEAD:   ( x ) Atraumatic     (  ) hematoma     (  ) laceration (specify location:       )     NECK:  ( x ) Supple     (  ) neck stiffness     (  ) nuchal rigidity     (  )  no JVD     (  ) JVD present ( -- cm)    HEART:  Rate -->     ( x ) normal rate     (  ) bradycardic     (  ) tachycardic  Rhythm -->     ( x ) regular     (  ) regularly irregular     (  ) irregularly irregular  Murmurs -->     ( x ) normal s1s2     (  ) systolic murmur     (  ) diastolic murmur     (  ) continuous murmur      (  ) S3 present     (  ) S4 present    LUNGS:   ( x )Unlabored respirations     (  ) tachypnea  (  ) B/L air entry     (  ) decreased breath sounds in:  (location     )    (  ) no adventitious sound     (  ) crackles     (  ) wheezing      (  ) rhonchi      (specify location:       )  (  ) chest wall tenderness (specify location:       )    ABDOMEN:   ( x ) Soft     (  ) tense   |   (  ) nondistended     (  ) distended   |   (  ) +BS     (  ) hypoactive bowel sounds     (  ) hyperactive bowel sounds  (  ) nontender     (  ) RUQ tenderness     (  ) RLQ tenderness     (  ) LLQ tenderness     (  ) epigastric tenderness     (  ) diffuse tenderness  (  ) Splenomegaly      (  ) Hepatomegaly      (  ) Jaundice     (  ) ecchymosis     EXTREMITIES:  ( x ) Normal     (  ) Rash     (  ) ecchymosis     (  ) varicose veins      (  ) pitting edema     (  ) non-pitting edema   (  ) ulceration     (  ) gangrene:     (location:     )    NERVOUS SYSTEM:    ( x ) A&Ox3     (  ) confused     (  ) lethargic  CN II-XII:     (  ) Intact     (  ) deficits found     (Specify:     )   Upper extremities:     (  ) no sensorimotor deficits     (  ) weakness     (  ) loss of proprioception/vibration     (  ) loss of touch/temperature (specify:    )  Lower extremities:     (  ) no sensorimotor deficits     (  ) weakness     (  ) loss of proprioception/vibration     (  ) loss of touch/temperature (specify:    )    SKIN:   ( x ) No rashes or lesions     (  ) maculopapular rash     (  ) pustules     (  ) vesicles     (  ) ulcer     (  ) ecchymosis     (specify location:     )    ( x ) Indwelling Delong Catheter:   Date inserted :  9/10  Reason (  ) Critical illness     ( x ) urinary retention    (  ) Accurate Ins/Outs Monitoring     (  ) CMO patient    PRIMARY DIAGNOSIS:  57-year-old female past medical history of asthma, hypertension, osteomyelitis of left ankle (not on Ab), prediabetes presented for 7-10 day history of difficulty urinating, found to have urosepsis.     ASSESSMENT:  # Left ankle osteomyelitis with chronic sinus draining duct  # Hypokalemia  # Hypocalcemia    PLAN:  ·	Sepsis secondary to Acute UTI (resolved)   - ID recs noted: On Cefepime and Flagyl   - Ucx : NGTD    ·	Multiple fractured teeth, carious lesions.   - Dental medicine on board  - Patient needs all remaining teeth extracted.   - NO swelling or infection present. No sign of acute infection.   - Patient requires extraction of remaining dentition :  Dental F/U with outpatient dentist for comprehensive dental care.   - Intraoral exam does not indicate that her intraoral condition is related to the sepsis, and does not indicate immediate dental attention.    ·	Hypomagnesemia (resolved)  - Magnesium repleted    ·	Hypokalemia (resolving)  - Repleting  - High potassium diet    ·	Hypocalcemia  - Calcium: 7.6 mg/dL (09.13.23 @ 11:47)    ·	DILIP secondary to obstructive uropathy (resolved)  ·	Acute urinary retention  - resolved (2.4 -> 1.2)  - trial of void failed so c/w delong's  - plan TOV after ambulating post-op    ·	Chronic osteomyelitis of left ankle with retained orthopaedic hardware   - Left ankle infected hardware s/p L ankle ORIF on 8/2022  - XR L ankle : Hardware intact, lucency around screws, fibula fracture at proximal end of plate  - L ankle I&D, removal of hardware (9/13)  - NWB LLE x 2 weeks until wound heals; then if wounds stable plan to transition to WBAT in CAM boot    ·	HTN  - on amlodipine, held due to normal blood pressure    ·	Asthma   - Albuterol PRN    Code status: Full  Pain control: Tylenol standing and Dilaudid PRN  DVT prophylaxis: Heparin  Lines: Delong's  IV fluids: @75 ccs/hr NS  O2 support: None  Antibiotics: Cefepime and Flagyl  Feeds: High potassium diet  Bowel Regimen: None  Bowel Movement: +  Urine Output: +  Activity: Bed bound; PT Consult  Disposition: SNIF    TO FOLLOW UP:  ·	PT consult   ·	Delong's : Plan for TOV today  ·	Anticoagulants  ·	Calcium levels  ·	Potassium levels     PHYSICAL EXAM:    GENERAL:   ( x ) NAD, lying in bed comfortably     (  ) obtunded     (  ) lethargic     (  ) somnolent    HEAD:   ( x ) Atraumatic     (  ) hematoma     (  ) laceration (specify location:       )     NECK:  ( x ) Supple     (  ) neck stiffness     (  ) nuchal rigidity     (  )  no JVD     (  ) JVD present ( -- cm)    HEART:  Rate -->     ( x ) normal rate     (  ) bradycardic     (  ) tachycardic  Rhythm -->     ( x ) regular     (  ) regularly irregular     (  ) irregularly irregular  Murmurs -->     ( x ) normal s1s2     (  ) systolic murmur     (  ) diastolic murmur     (  ) continuous murmur      (  ) S3 present     (  ) S4 present    LUNGS:   ( x )Unlabored respirations     (  ) tachypnea  (  ) B/L air entry     (  ) decreased breath sounds in:  (location     )    (  ) no adventitious sound     (  ) crackles     (  ) wheezing      (  ) rhonchi      (specify location:       )  (  ) chest wall tenderness (specify location:       )    ABDOMEN:   ( x ) Soft     (  ) tense   |   (  ) nondistended     (  ) distended   |   (  ) +BS     (  ) hypoactive bowel sounds     (  ) hyperactive bowel sounds  (  ) nontender     (  ) RUQ tenderness     (  ) RLQ tenderness     (  ) LLQ tenderness     (  ) epigastric tenderness     (  ) diffuse tenderness  (  ) Splenomegaly      (  ) Hepatomegaly      (  ) Jaundice     (  ) ecchymosis     EXTREMITIES:  ( x ) Normal     (  ) Rash     (  ) ecchymosis     (  ) varicose veins      (  ) pitting edema     (  ) non-pitting edema   (  ) ulceration     (  ) gangrene:     (location:     )    NERVOUS SYSTEM:    ( x ) A&Ox3     (  ) confused     (  ) lethargic  CN II-XII:     (  ) Intact     (  ) deficits found     (Specify:     )   Upper extremities:     (  ) no sensorimotor deficits     (  ) weakness     (  ) loss of proprioception/vibration     (  ) loss of touch/temperature (specify:    )  Lower extremities:     (  ) no sensorimotor deficits     (  ) weakness     (  ) loss of proprioception/vibration     (  ) loss of touch/temperature (specify:    )    SKIN:   ( x ) No rashes or lesions     (  ) maculopapular rash     (  ) pustules     (  ) vesicles     (  ) ulcer     (  ) ecchymosis     (specify location:     )    ( x ) Indwelling Delong Catheter:   Date inserted :  9/10  Reason (  ) Critical illness     ( x ) urinary retention    (  ) Accurate Ins/Outs Monitoring     (  ) CMO patient    PRIMARY DIAGNOSIS:  57-year-old female past medical history of asthma, hypertension, osteomyelitis of left ankle (not on Ab), prediabetes presented for 7-10 day history of difficulty urinating, found to have urosepsis.     ASSESSMENT:  # Left ankle osteomyelitis with chronic sinus draining duct  # Hypokalemia  # Hypocalcemia    PLAN:  ·	Sepsis secondary to Acute UTI (resolved)   - ID recs noted: On Cefepime and Flagyl   - Ucx : NGTD    ·	Multiple fractured teeth, carious lesions.   - Dental medicine on board  - Patient needs all remaining teeth extracted.   - NO swelling or infection present. No sign of acute infection.   - Patient requires extraction of remaining dentition :  Dental F/U with outpatient dentist for comprehensive dental care.   - Intraoral exam does not indicate that her intraoral condition is related to the sepsis, and does not indicate immediate dental attention.    ·	Hypomagnesemia (resolved)  - Magnesium repleted    ·	Hypokalemia (resolving)  - Repleting  - High potassium diet    ·	Hypocalcemia  - Calcium: 7.6 mg/dL (09.13.23 @ 11:47)    ·	DILIP secondary to obstructive uropathy (resolved)  ·	Acute urinary retention  - resolved (2.4 -> 1.2)  - trial of void failed so c/w delong's  - plan TOV after ambulating post-op    ·	Chronic osteomyelitis of left ankle with retained orthopaedic hardware   - Left ankle infected hardware s/p L ankle ORIF on 8/2022  - XR L ankle : Hardware intact, lucency around screws, fibula fracture at proximal end of plate  - L ankle I&D, removal of hardware (9/13)  - NWB LLE x 2 weeks until wound heals; then if wounds stable plan to transition to WBAT in CAM boot    ·	HTN  - on amlodipine, held due to normal blood pressure    ·	Asthma   - Albuterol PRN    Code status: Full  Pain control: Tylenol standing and Dilaudid PRN  DVT prophylaxis: Heparin  Lines: Delong's  IV fluids: @75 ccs/hr NS  O2 support: None  Antibiotics: Cefepime and Flagyl  Feeds: High potassium diet  Bowel Regimen: None  Bowel Movement: +  Urine Output: +  Activity: Bed bound; PT Consult  Disposition: SNIF    TO FOLLOW UP:  ·	PT consult   ·	Delong's : Plan for TOV today  ·	Calcium levels  ·	Potassium levels  ·	Culture: Acid fast, Fungal and Surgical swab     PHYSICAL EXAM:    GENERAL:   ( x ) NAD, lying in bed comfortably     (  ) obtunded     (  ) lethargic     (  ) somnolent    HEAD:   ( x ) Atraumatic     (  ) hematoma     (  ) laceration (specify location:       )     NECK:  ( x ) Supple     (  ) neck stiffness     (  ) nuchal rigidity     (  )  no JVD     (  ) JVD present ( -- cm)    HEART:  Rate -->     ( x ) normal rate     (  ) bradycardic     (  ) tachycardic  Rhythm -->     ( x ) regular     (  ) regularly irregular     (  ) irregularly irregular  Murmurs -->     ( x ) normal s1s2     (  ) systolic murmur     (  ) diastolic murmur     (  ) continuous murmur      (  ) S3 present     (  ) S4 present    LUNGS:   ( x )Unlabored respirations     (  ) tachypnea  (  ) B/L air entry     (  ) decreased breath sounds in:  (location     )    (  ) no adventitious sound     (  ) crackles     (  ) wheezing      (  ) rhonchi      (specify location:       )  (  ) chest wall tenderness (specify location:       )    ABDOMEN:   ( x ) Soft     (  ) tense   |   (  ) nondistended     (  ) distended   |   (  ) +BS     (  ) hypoactive bowel sounds     (  ) hyperactive bowel sounds  (  ) nontender     (  ) RUQ tenderness     (  ) RLQ tenderness     (  ) LLQ tenderness     (  ) epigastric tenderness     (  ) diffuse tenderness  (  ) Splenomegaly      (  ) Hepatomegaly      (  ) Jaundice     (  ) ecchymosis     EXTREMITIES:  ( x ) Normal     (  ) Rash     (  ) ecchymosis     (  ) varicose veins      (  ) pitting edema     (  ) non-pitting edema   (  ) ulceration     (  ) gangrene:     (location:     )    NERVOUS SYSTEM:    ( x ) A&Ox3     (  ) confused     (  ) lethargic  CN II-XII:     (  ) Intact     (  ) deficits found     (Specify:     )   Upper extremities:     (  ) no sensorimotor deficits     (  ) weakness     (  ) loss of proprioception/vibration     (  ) loss of touch/temperature (specify:    )  Lower extremities:     (  ) no sensorimotor deficits     (  ) weakness     (  ) loss of proprioception/vibration     (  ) loss of touch/temperature (specify:    )    SKIN:   ( x ) No rashes or lesions     (  ) maculopapular rash     (  ) pustules     (  ) vesicles     (  ) ulcer     (  ) ecchymosis     (specify location:     )    ( x ) Indwelling Delong Catheter:   Date inserted :  9/10  Reason (  ) Critical illness     ( x ) urinary retention    (  ) Accurate Ins/Outs Monitoring     (  ) CMO patient    PRIMARY DIAGNOSIS:  57-year-old female past medical history of asthma, hypertension, osteomyelitis of left ankle (not on Ab), prediabetes presented for 7-10 day history of difficulty urinating, found to have urosepsis.     ASSESSMENT:  # Left ankle osteomyelitis with chronic sinus draining duct  # Leukocytosis  # Hypokalemia  # Hypocalcemia  # Hypomagnesemia    PLAN:  ·	Sepsis secondary to Acute UTI (resolved)   ·	Leukocytosis (Uptrending)  - WBC Count: 13.59 K/uL (09.14.23 @ 07:37)  - On Cefepime and Flagyl   - Ucx : NGTD    ·	Multiple fractured teeth, carious lesions.   - Dental medicine on board  - Patient needs all remaining teeth extracted.   - NO swelling or infection present. No sign of acute infection.   - Patient requires extraction of remaining dentition :  Dental F/U with outpatient dentist for comprehensive dental care.   - Intraoral exam does not indicate that her intraoral condition is related to the sepsis, and does not indicate immediate dental attention.    ·	Hypomagnesemia   - Magnesium: 1.6 mg/dL (09.14.23 @ 07:37)    ·	Hypokalemia (resolved)  - Potassium: 3.9 mmol/L (09.14.23 @ 07:37)  - High potassium diet    ·	Hypocalcemia  - Calcium: 7.6 mg/dL (09.13.23 @ 11:47)    ·	DILIP secondary to obstructive uropathy (resolved)  ·	Acute urinary retention  - resolved (2.4 -> 1.2)  - trial of void failed so c/w delong's  - plan TOV after ambulating post-op    ·	Chronic osteomyelitis of left ankle with retained orthopaedic hardware   - Left ankle infected hardware s/p L ankle ORIF on 8/2022  - XR L ankle : Hardware intact, lucency around screws, fibula fracture at proximal end of plate  - L ankle I&D, removal of hardware (9/13)  - NWB LLE x 2 weeks until wound heals; then if wounds stable plan to transition to WBAT in CAM boot    ·	HTN  - on amlodipine, held due to normal blood pressure    ·	Asthma   - Albuterol PRN    Code status: Full  Pain control: Tylenol standing and Dilaudid PRN  DVT prophylaxis: Heparin  Lines: Delnog's  IV fluids: @75 ccs/hr NS  O2 support: None  Antibiotics: Cefepime and Flagyl  Feeds: High potassium diet  Bowel Regimen: None  Bowel Movement: +  Urine Output: +  Activity: Bed bound; PT Consult  Disposition: SNIF    TO FOLLOW UP:  ·	PT consult   ·	Edlong's : Plan for TOV today  ·	Calcium levels  ·	Potassium levels  ·	Culture: Acid fast, Fungal and Surgical swab     PHYSICAL EXAM:    GENERAL:   ( x ) NAD, lying in bed comfortably     (  ) obtunded     (  ) lethargic     (  ) somnolent    HEAD:   ( x ) Atraumatic     (  ) hematoma     (  ) laceration (specify location:       )     NECK:  ( x ) Supple     (  ) neck stiffness     (  ) nuchal rigidity     (  )  no JVD     (  ) JVD present ( -- cm)    HEART:  Rate -->     ( x ) normal rate     (  ) bradycardic     (  ) tachycardic  Rhythm -->     ( x ) regular     (  ) regularly irregular     (  ) irregularly irregular  Murmurs -->     ( x ) normal s1s2     (  ) systolic murmur     (  ) diastolic murmur     (  ) continuous murmur      (  ) S3 present     (  ) S4 present    LUNGS:   ( x )Unlabored respirations     (  ) tachypnea  (  ) B/L air entry     (  ) decreased breath sounds in:  (location     )    (  ) no adventitious sound     (  ) crackles     (  ) wheezing      (  ) rhonchi      (specify location:       )  (  ) chest wall tenderness (specify location:       )    ABDOMEN:   ( x ) Soft     (  ) tense   |   (  ) nondistended     (  ) distended   |   (  ) +BS     (  ) hypoactive bowel sounds     (  ) hyperactive bowel sounds  (  ) nontender     (  ) RUQ tenderness     (  ) RLQ tenderness     (  ) LLQ tenderness     (  ) epigastric tenderness     (  ) diffuse tenderness  (  ) Splenomegaly      (  ) Hepatomegaly      (  ) Jaundice     (  ) ecchymosis     EXTREMITIES:  ( x ) Normal     (  ) Rash     (  ) ecchymosis     (  ) varicose veins      (  ) pitting edema     (  ) non-pitting edema   (  ) ulceration     (  ) gangrene:     (location:     )    NERVOUS SYSTEM:    ( x ) A&Ox3     (  ) confused     (  ) lethargic  CN II-XII:     (  ) Intact     (  ) deficits found     (Specify:     )   Upper extremities:     (  ) no sensorimotor deficits     (  ) weakness     (  ) loss of proprioception/vibration     (  ) loss of touch/temperature (specify:    )  Lower extremities:     (  ) no sensorimotor deficits     (  ) weakness     (  ) loss of proprioception/vibration     (  ) loss of touch/temperature (specify:    )    SKIN:   ( x ) No rashes or lesions     (  ) maculopapular rash     (  ) pustules     (  ) vesicles     (  ) ulcer     (  ) ecchymosis     (specify location:     )    ( x ) Indwelling Delong Catheter:   Date inserted :  9/10  Reason (  ) Critical illness     ( x ) urinary retention    (  ) Accurate Ins/Outs Monitoring     (  ) CMO patient    PRIMARY DIAGNOSIS:  57-year-old female past medical history of asthma, hypertension, osteomyelitis of left ankle (not on Ab), prediabetes presented for 7-10 day history of difficulty urinating, found to have urosepsis.     ASSESSMENT:  # Left ankle osteomyelitis with chronic sinus draining duct  # Leukocytosis  # Hypokalemia  # Hypocalcemia  # Hypomagnesemia    PLAN:  ·	Sepsis secondary to Acute UTI (resolved)   ·	Leukocytosis (Uptrending)  - WBC Count: 13.59 K/uL (09.14.23 @ 07:37)  - On Cefepime and Flagyl   - Ucx : NGTD    ·	Multiple fractured teeth, carious lesions.   - Dental medicine on board  - Patient needs all remaining teeth extracted.   - NO swelling or infection present. No sign of acute infection.   - Patient requires extraction of remaining dentition :  Dental F/U with outpatient dentist for comprehensive dental care.   - Intraoral exam does not indicate that her intraoral condition is related to the sepsis, and does not indicate immediate dental attention.    ·	Hypomagnesemia   - Magnesium: 1.6 mg/dL (09.14.23 @ 07:37)    ·	Hypokalemia (resolved)  - Potassium: 3.9 mmol/L (09.14.23 @ 07:37)  - High potassium diet    ·	Hypocalcemia (Pseudo)  - Calcium: 7.6 mg/dL (09.13.23 @ 11:47)  - Corrected: 9.6     ·	DILIP secondary to obstructive uropathy (resolved)  ·	Acute urinary retention  - resolved (2.4 -> 1.2)  - trial of void failed so c/w delong's  - plan TOV after ambulating post-op    ·	Chronic osteomyelitis of left ankle with retained orthopaedic hardware   - Left ankle infected hardware s/p L ankle ORIF on 8/2022  - XR L ankle : Hardware intact, lucency around screws, fibula fracture at proximal end of plate  - L ankle I&D, removal of hardware (9/13)  - NWB LLE x 2 weeks until wound heals; then if wounds stable plan to transition to WBAT in CAM boot    ·	HTN  - on amlodipine, held due to normal blood pressure    ·	Asthma   - Albuterol PRN    Code status: Full  Pain control: Tylenol standing and Dilaudid PRN  DVT prophylaxis: Heparin  Lines: Delong's  IV fluids: @75 ccs/hr NS  O2 support: None  Antibiotics: Cefepime and Flagyl  Feeds: High potassium diet  Bowel Regimen: None  Bowel Movement: +  Urine Output: +  Activity: Bed bound; PT Consult  Disposition: SNIF    TO FOLLOW UP:  ·	PT consult   ·	Delong's : Plan for TOV today  ·	Calcium levels  ·	Potassium levels  ·	Culture: Acid fast, Fungal and Surgical swab     PHYSICAL EXAM:    GENERAL:   ( x ) NAD, lying in bed comfortably     (  ) obtunded     (  ) lethargic     (  ) somnolent    HEAD:   ( x ) Atraumatic     (  ) hematoma     (  ) laceration (specify location:       )     NECK:  ( x ) Supple     (  ) neck stiffness     (  ) nuchal rigidity     (  )  no JVD     (  ) JVD present ( -- cm)    HEART:  Rate -->     ( x ) normal rate     (  ) bradycardic     (  ) tachycardic  Rhythm -->     ( x ) regular     (  ) regularly irregular     (  ) irregularly irregular  Murmurs -->     ( x ) normal s1s2     (  ) systolic murmur     (  ) diastolic murmur     (  ) continuous murmur      (  ) S3 present     (  ) S4 present    LUNGS:   ( x )Unlabored respirations     (  ) tachypnea  (  ) B/L air entry     (  ) decreased breath sounds in:  (location     )    (  ) no adventitious sound     (  ) crackles     (  ) wheezing      (  ) rhonchi      (specify location:       )  (  ) chest wall tenderness (specify location:       )    ABDOMEN:   ( x ) Soft     (  ) tense   |   (  ) nondistended     (  ) distended   |   (  ) +BS     (  ) hypoactive bowel sounds     (  ) hyperactive bowel sounds  (  ) nontender     (  ) RUQ tenderness     (  ) RLQ tenderness     (  ) LLQ tenderness     (  ) epigastric tenderness     (  ) diffuse tenderness  (  ) Splenomegaly      (  ) Hepatomegaly      (  ) Jaundice     (  ) ecchymosis     EXTREMITIES:  ( x ) Normal     (  ) Rash     (  ) ecchymosis     (  ) varicose veins      (  ) pitting edema     (  ) non-pitting edema   (  ) ulceration     (  ) gangrene:     (location:     )    NERVOUS SYSTEM:    ( x ) A&Ox3     (  ) confused     (  ) lethargic  CN II-XII:     (  ) Intact     (  ) deficits found     (Specify:     )   Upper extremities:     (  ) no sensorimotor deficits     (  ) weakness     (  ) loss of proprioception/vibration     (  ) loss of touch/temperature (specify:    )  Lower extremities:     (  ) no sensorimotor deficits     (  ) weakness     (  ) loss of proprioception/vibration     (  ) loss of touch/temperature (specify:    )    SKIN:   ( x ) No rashes or lesions     (  ) maculopapular rash     (  ) pustules     (  ) vesicles     (  ) ulcer     (  ) ecchymosis     (specify location:     )    ( x ) Indwelling Delong Catheter:   Date inserted :  9/10  Reason (  ) Critical illness     ( x ) urinary retention    (  ) Accurate Ins/Outs Monitoring     (  ) CMO patient    PRIMARY DIAGNOSIS:  57-year-old female past medical history of asthma, hypertension, osteomyelitis of left ankle (not on Ab), prediabetes presented for 7-10 day history of difficulty urinating, found to have urosepsis.     ASSESSMENT:  # Left ankle osteomyelitis with chronic sinus draining duct  # Leukocytosis  # Hypokalemia  # Hypocalcemia  # Hypomagnesemia    PLAN:  ·	Sepsis secondary to Acute UTI (resolved)   ·	Leukocytosis (Uptrending)  - WBC Count: 13.59 K/uL (09.14.23 @ 07:37)  - On Cefepime and Flagyl   - Ucx : NGTD    ·	Multiple fractured teeth, carious lesions.   - Dental medicine on board  - Patient needs all remaining teeth extracted.   - NO swelling or infection present. No sign of acute infection.   - Patient requires extraction of remaining dentition :  Dental F/U with outpatient dentist for comprehensive dental care.   - Intraoral exam does not indicate that her intraoral condition is related to the sepsis, and does not indicate immediate dental attention.    ·	Hypomagnesemia   - Magnesium: 1.6 mg/dL (09.14.23 @ 07:37)    ·	Hypokalemia (resolved)  - Potassium: 3.9 mmol/L (09.14.23 @ 07:37)  - High potassium diet    ·	Hypocalcemia (Pseudo)  - Calcium: 7.6 mg/dL (09.13.23 @ 11:47)  - Corrected: 9.6     ·	DILIP secondary to obstructive uropathy (resolved)  ·	Acute urinary retention  - resolved (2.4 -> 1.2)  - trial of void failed so c/w delong's  - plan TOV after ambulating post-op    ·	Chronic osteomyelitis of left ankle with retained orthopaedic hardware   - Left ankle infected hardware s/p L ankle ORIF on 8/2022  - XR L ankle : Hardware intact, lucency around screws, fibula fracture at proximal end of plate  - L ankle I&D, removal of hardware (9/13)  - NWB LLE x 2 weeks until wound heals; then if wounds stable plan to transition to WBAT in CAM boot    ·	HTN  - on amlodipine, held due to normal blood pressure    ·	Asthma   - Albuterol PRN    Code status: Full  Pain control: Tylenol standing and Dilaudid PRN  DVT prophylaxis: Heparin  Lines: Delong's  IV fluids: @75 ccs/hr NS  O2 support: None  Antibiotics: Cefepime and Flagyl  Feeds: High potassium diet  Bowel Regimen: None  Bowel Movement: +  Urine Output: +  Activity: Bed bound; PT Consult  Disposition: SNIF    TO FOLLOW UP:  ·	PT consult   ·	Delong's : Plan for TOV today  ·	Potassium and Magnesium levels  ·	Culture: Acid fast, Fungal and Surgical swab : NGTD  ·	Waiting for the cultures regarding outpatient antibiotics  ·	PICC placement

## 2023-09-15 LAB
ALBUMIN SERPL ELPH-MCNC: 2.5 G/DL — LOW (ref 3.5–5.2)
ALP SERPL-CCNC: 63 U/L — SIGNIFICANT CHANGE UP (ref 30–115)
ALT FLD-CCNC: 6 U/L — SIGNIFICANT CHANGE UP (ref 0–41)
ANION GAP SERPL CALC-SCNC: 11 MMOL/L — SIGNIFICANT CHANGE UP (ref 7–14)
AST SERPL-CCNC: 11 U/L — SIGNIFICANT CHANGE UP (ref 0–41)
BASOPHILS # BLD AUTO: 0.06 K/UL — SIGNIFICANT CHANGE UP (ref 0–0.2)
BASOPHILS NFR BLD AUTO: 0.6 % — SIGNIFICANT CHANGE UP (ref 0–1)
BILIRUB SERPL-MCNC: <0.2 MG/DL — SIGNIFICANT CHANGE UP (ref 0.2–1.2)
BUN SERPL-MCNC: 5 MG/DL — LOW (ref 10–20)
CALCIUM SERPL-MCNC: 7.8 MG/DL — LOW (ref 8.4–10.4)
CHLORIDE SERPL-SCNC: 108 MMOL/L — SIGNIFICANT CHANGE UP (ref 98–110)
CO2 SERPL-SCNC: 20 MMOL/L — SIGNIFICANT CHANGE UP (ref 17–32)
CREAT SERPL-MCNC: 0.6 MG/DL — LOW (ref 0.7–1.5)
EGFR: 105 ML/MIN/1.73M2 — SIGNIFICANT CHANGE UP
EOSINOPHIL # BLD AUTO: 0.39 K/UL — SIGNIFICANT CHANGE UP (ref 0–0.7)
EOSINOPHIL NFR BLD AUTO: 3.7 % — SIGNIFICANT CHANGE UP (ref 0–8)
GLUCOSE SERPL-MCNC: 82 MG/DL — SIGNIFICANT CHANGE UP (ref 70–99)
HCT VFR BLD CALC: 26.9 % — LOW (ref 37–47)
HGB BLD-MCNC: 9 G/DL — LOW (ref 12–16)
IMM GRANULOCYTES NFR BLD AUTO: 0.5 % — HIGH (ref 0.1–0.3)
LYMPHOCYTES # BLD AUTO: 2.46 K/UL — SIGNIFICANT CHANGE UP (ref 1.2–3.4)
LYMPHOCYTES # BLD AUTO: 23.1 % — SIGNIFICANT CHANGE UP (ref 20.5–51.1)
MAGNESIUM SERPL-MCNC: 1.7 MG/DL — LOW (ref 1.8–2.4)
MCHC RBC-ENTMCNC: 32.6 PG — HIGH (ref 27–31)
MCHC RBC-ENTMCNC: 33.5 G/DL — SIGNIFICANT CHANGE UP (ref 32–37)
MCV RBC AUTO: 97.5 FL — SIGNIFICANT CHANGE UP (ref 81–99)
MONOCYTES # BLD AUTO: 0.87 K/UL — HIGH (ref 0.1–0.6)
MONOCYTES NFR BLD AUTO: 8.2 % — SIGNIFICANT CHANGE UP (ref 1.7–9.3)
NEUTROPHILS # BLD AUTO: 6.83 K/UL — HIGH (ref 1.4–6.5)
NEUTROPHILS NFR BLD AUTO: 63.9 % — SIGNIFICANT CHANGE UP (ref 42.2–75.2)
NRBC # BLD: 0 /100 WBCS — SIGNIFICANT CHANGE UP (ref 0–0)
PLATELET # BLD AUTO: 258 K/UL — SIGNIFICANT CHANGE UP (ref 130–400)
PMV BLD: 11.1 FL — HIGH (ref 7.4–10.4)
POTASSIUM SERPL-MCNC: 3.5 MMOL/L — SIGNIFICANT CHANGE UP (ref 3.5–5)
POTASSIUM SERPL-SCNC: 3.5 MMOL/L — SIGNIFICANT CHANGE UP (ref 3.5–5)
PROT SERPL-MCNC: 4.8 G/DL — LOW (ref 6–8)
RBC # BLD: 2.76 M/UL — LOW (ref 4.2–5.4)
RBC # FLD: 14.6 % — HIGH (ref 11.5–14.5)
SODIUM SERPL-SCNC: 139 MMOL/L — SIGNIFICANT CHANGE UP (ref 135–146)
WBC # BLD: 10.66 K/UL — SIGNIFICANT CHANGE UP (ref 4.8–10.8)
WBC # FLD AUTO: 10.66 K/UL — SIGNIFICANT CHANGE UP (ref 4.8–10.8)

## 2023-09-15 PROCEDURE — 99233 SBSQ HOSP IP/OBS HIGH 50: CPT

## 2023-09-15 RX ORDER — MAGNESIUM OXIDE 400 MG ORAL TABLET 241.3 MG
400 TABLET ORAL
Refills: 0 | Status: COMPLETED | OUTPATIENT
Start: 2023-09-15 | End: 2023-09-16

## 2023-09-15 RX ORDER — CHLORHEXIDINE GLUCONATE 213 G/1000ML
1 SOLUTION TOPICAL
Refills: 0 | Status: DISCONTINUED | OUTPATIENT
Start: 2023-09-15 | End: 2023-09-19

## 2023-09-15 RX ORDER — MAGNESIUM SULFATE 500 MG/ML
1 VIAL (ML) INJECTION ONCE
Refills: 0 | Status: DISCONTINUED | OUTPATIENT
Start: 2023-09-15 | End: 2023-09-15

## 2023-09-15 RX ORDER — CEFTRIAXONE 500 MG/1
2000 INJECTION, POWDER, FOR SOLUTION INTRAMUSCULAR; INTRAVENOUS EVERY 24 HOURS
Refills: 0 | Status: DISCONTINUED | OUTPATIENT
Start: 2023-09-15 | End: 2023-09-18

## 2023-09-15 RX ADMIN — Medication 100 MILLIGRAM(S): at 06:10

## 2023-09-15 RX ADMIN — HYDROMORPHONE HYDROCHLORIDE 1 MILLIGRAM(S): 2 INJECTION INTRAMUSCULAR; INTRAVENOUS; SUBCUTANEOUS at 02:46

## 2023-09-15 RX ADMIN — Medication 975 MILLIGRAM(S): at 14:39

## 2023-09-15 RX ADMIN — Medication 975 MILLIGRAM(S): at 07:00

## 2023-09-15 RX ADMIN — HYDROMORPHONE HYDROCHLORIDE 1 MILLIGRAM(S): 2 INJECTION INTRAMUSCULAR; INTRAVENOUS; SUBCUTANEOUS at 21:35

## 2023-09-15 RX ADMIN — HYDROMORPHONE HYDROCHLORIDE 1 MILLIGRAM(S): 2 INJECTION INTRAMUSCULAR; INTRAVENOUS; SUBCUTANEOUS at 03:15

## 2023-09-15 RX ADMIN — CEFEPIME 100 MILLIGRAM(S): 1 INJECTION, POWDER, FOR SOLUTION INTRAMUSCULAR; INTRAVENOUS at 06:12

## 2023-09-15 RX ADMIN — Medication 975 MILLIGRAM(S): at 22:00

## 2023-09-15 RX ADMIN — MAGNESIUM OXIDE 400 MG ORAL TABLET 400 MILLIGRAM(S): 241.3 TABLET ORAL at 18:10

## 2023-09-15 RX ADMIN — HEPARIN SODIUM 5000 UNIT(S): 5000 INJECTION INTRAVENOUS; SUBCUTANEOUS at 18:10

## 2023-09-15 RX ADMIN — CEFTRIAXONE 100 MILLIGRAM(S): 500 INJECTION, POWDER, FOR SOLUTION INTRAMUSCULAR; INTRAVENOUS at 21:21

## 2023-09-15 RX ADMIN — Medication 100 MILLIGRAM(S): at 21:21

## 2023-09-15 RX ADMIN — HYDROMORPHONE HYDROCHLORIDE 1 MILLIGRAM(S): 2 INJECTION INTRAMUSCULAR; INTRAVENOUS; SUBCUTANEOUS at 21:09

## 2023-09-15 RX ADMIN — Medication 3 MILLIGRAM(S): at 21:14

## 2023-09-15 RX ADMIN — Medication 975 MILLIGRAM(S): at 21:16

## 2023-09-15 RX ADMIN — Medication 975 MILLIGRAM(S): at 06:11

## 2023-09-15 RX ADMIN — HYDROMORPHONE HYDROCHLORIDE 1 MILLIGRAM(S): 2 INJECTION INTRAMUSCULAR; INTRAVENOUS; SUBCUTANEOUS at 11:43

## 2023-09-15 RX ADMIN — HEPARIN SODIUM 5000 UNIT(S): 5000 INJECTION INTRAVENOUS; SUBCUTANEOUS at 06:11

## 2023-09-15 RX ADMIN — Medication 100 MILLIGRAM(S): at 14:39

## 2023-09-15 RX ADMIN — PANTOPRAZOLE SODIUM 40 MILLIGRAM(S): 20 TABLET, DELAYED RELEASE ORAL at 06:11

## 2023-09-15 NOTE — PHARMACOTHERAPY INTERVENTION NOTE - COMMENTS
Recommended continuing metronidazole 500mg IV q8h as per ID consult recommendations.    Ranjan Vines, PharmD  Clinical Pharmacy Specialist, Infectious Diseases  Tele-Antimicrobial Stewardship Program (Tele-[facility])  Tele-ASP Phone: [phone] 
Modified penicillin allergy to state patient tolerated cefepime during this admission.    Ranjan Vines, PharmD  Clinical Pharmacy Specialist, Infectious Diseases  Tele-Antimicrobial Stewardship Program (Tele-[facility])  Tele-ASP Phone: [phone] 
Recommended de-escalating from cefepime to ceftriaxone 2g IV q24h as per ID consult recommendations.    Ranjan Vines, PharmD  Clinical Pharmacy Specialist, Infectious Diseases  Tele-Antimicrobial Stewardship Program (Tele-[facility])  Tele-ASP Phone: [phone]

## 2023-09-15 NOTE — CONSULT NOTE ADULT - CONSULT REASON
Dental consult regarding abscess observed in mandibular xray
L ANKLE OM
L Ankle Wound
deconditioning
urinary symptoms

## 2023-09-15 NOTE — CONSULT NOTE ADULT - SUBJECTIVE AND OBJECTIVE BOX
HPI:   57-year-old female past medical history of asthma, hypertension, osteomyelitis of left ankle (not on Ab), prediabetes presented for 7-10 day history of difficulty urinating. She reports having pain initiating the stream, and only having a few drops of urine come out at at time. No pain while urinating. No hematuria. This has been associated with 1 week of nausea/ vomiting clear liquid and inability to eat. She reports losing 5-10 pounds over the past week. No abdominal pain. No reported fever / chills. No diarrhea. ROS also positive for productive cough (whitish sputum) over the past week + sore throat. No chest pain / SOB. No sick contacts. No recent hospitalization or antibiotic use. She has 2 previous episodes of UTI (one of which she got admitted to [facility] for). She also has osteomyelitis of her R ankle, not on treatment, was planned for surgery + biopsy before she got sick.      In ED, vitals showed: BP: 100/65, HR:127, RR: 18, temp: 97.3, spO2: 98% --> HR improved with fluids  Labs significant for WBC: 21K, creatinine: 3.9, Na: 130, K:5.9, bicarb: 13, A, lactate 1.6   UA: positive for blood and LE  - CT:Diffuse urinary bladder wall thickening, mucosal hyperenhancement, compatible with urinary bladder infection, cystitis. Increased right renal pelvis and ureter urothelial enhancement, compatible with ascending urinary tract infection.  - CT Neck: Dental caries and odontogenic disease. Impacted right molar tooth associated with periapical lucency and buccal dehiscence. Small 3 x 3 mm  abscess is visualized adjacent to the right mandible.Left middle cerebral artery, M2 segment 7 mm aneurysm. Scattered groundglass opacities and tree-in-bud nodules. Etiology   favors infection/inflammation.    s/p 1L LR _ ceftriaxone, admitted to medicine    Pt is  found to have DILIP, urinary retention, but infection was mostly likely originating from LLE infected hard ware. She was consulted by ortho, hard wear was removed by  on . NWB LLE x 2 wks as per ortho      PAST MEDICAL & SURGICAL HISTORY:      Hospital Course:    TODAY'S SUBJECTIVE & REVIEW OF SYMPTOMS:     Constitutional WNL   Cardio WNL   Resp WNL   GI WNL  Heme WNL  Endo WNL  Skin WNL  MSK Weakness   Neuro WNL  Cognitive WNL  Psych WNL      MEDICATIONS  (STANDING):  acetaminophen     Tablet .. 975 milliGRAM(s) Oral every 8 hours  cefepime   IVPB 1000 milliGRAM(s) IV Intermittent every 12 hours  cefepime   IVPB      heparin   Injectable 5000 Unit(s) SubCutaneous every 12 hours  influenza   Vaccine 0.5 milliLiter(s) IntraMuscular once  magnesium sulfate  IVPB 1 Gram(s) IV Intermittent once  metroNIDAZOLE  IVPB 500 milliGRAM(s) IV Intermittent every 8 hours  metroNIDAZOLE  IVPB      pantoprazole    Tablet 40 milliGRAM(s) Oral before breakfast  polyethylene glycol 3350 17 Gram(s) Oral daily  sodium chloride 0.9%. 1000 milliLiter(s) (75 mL/Hr) IV Continuous <Continuous>    MEDICATIONS  (PRN):  albuterol    90 MICROgram(s) HFA Inhaler 2 Puff(s) Inhalation every 6 hours PRN Shortness of Breath and/or Wheezing  benzocaine 20% Spray 1 Spray(s) Topical three times a day PRN throat pain  HYDROmorphone  Injectable 1 milliGRAM(s) IV Push every 4 hours PRN Severe Pain (7 - 10)  melatonin 3 milliGRAM(s) Oral at bedtime PRN Sleep  ondansetron Injectable 4 milliGRAM(s) IV Push every 6 hours PRN Nausea and/or Vomiting      FAMILY HISTORY:      Allergies    penicillin (Anaphylaxis)    Intolerances        SOCIAL HISTORY:    [  ] Etoh  [  ] Smoking  [  ] Substance abuse     Home Environment:  [ x  ] Home Alone  [   ] Lives with Family  [   ] Home Health Aid    Dwelling:  [ x  ] Apartment  [   ] Private House  [   ] Adult Home  [   ] Skilled Nursing Facility      [   ] Short Term  [   ] Long Term  [   ] Stairs       Elevator [x   ]    FUNCTIONAL STATUS PTA: (Check all that apply)  Ambulation: [ x   ]Independent    [   ] Dependent     [   ] Non-Ambulatory  Assistive Device: [   ] SA Cane  [   ]  Q Cane  [ x  ] Walker  [   ]  Wheelchair  ADL : [ x  ] Independent  [    ]  Dependent       Vital Signs Last 24 Hrs  T(C): 36.1 (15 Sep 2023 13:05), Max: 36.3 (14 Sep 2023 19:33)  T(F): 97 (15 Sep 2023 13:05), Max: 97.3 (14 Sep 2023 19:33)  HR: 65 (15 Sep 2023 13:05) (65 - 73)  BP: 106/60 (15 Sep 2023 13:05) (94/58 - 106/60)  BP(mean): --  RR: 18 (15 Sep 2023 13:05) (18 - 18)  SpO2: 96% (15 Sep 2023 04:43) (96% - 96%)          PHYSICAL EXAM: Awake & Alert  GENERAL: NAD  HEAD:  Normocephalic  CHEST/LUNG: Clear   HEART: S1S2+  ABDOMEN: Soft, Nontender  EXTREMITIES:  LLE in splint     NERVOUS SYSTEM:  Cranial Nerves 2-12 intact [   ] Abnormal  [   ]  ROM: WFL all extremities [   ]  Abnormal [ x  ]limited LLE  Motor Strength: WFL all extremities  [   ]  Abnormal [ x  ]limited LLE  Sensation: intact to light touch [  x ] Abnormal [   ]    FUNCTIONAL STATUS:  Bed Mobility: Independent [   ]  Supervision [   ]  Needs Assistance [  x ]  N/A [   ]  Transfers: Independent [   ]  Supervision [   ]  Needs Assistance [   ]  N/A [   ]   Ambulation: Independent [   ]  Supervision [   ]  Needs Assistance [   ]  N/A [   ]  ADL: Independent [   ] Requires Assistance [   ] N/A [   ]      LABS:                        9.0    10.66 )-----------( 258      ( 15 Sep 2023 07:44 )             26.9     09-15    139  |  108  |  5<L>  ----------------------------<  82  3.5   |  20  |  0.6<L>    Ca    7.8<L>      15 Sep 2023 07:44  Mg     1.7     -15    TPro  4.8<L>  /  Alb  2.5<L>  /  TBili  <0.2  /  DBili  x   /  AST  11  /  ALT  6   /  AlkPhos  63  09-15      Urinalysis Basic - ( 15 Sep 2023 07:44 )    Color: x / Appearance: x / SG: x / pH: x  Gluc: 82 mg/dL / Ketone: x  / Bili: x / Urobili: x   Blood: x / Protein: x / Nitrite: x   Leuk Esterase: x / RBC: x / WBC x   Sq Epi: x / Non Sq Epi: x / Bacteria: x        RADIOLOGY & ADDITIONAL STUDIES:

## 2023-09-15 NOTE — PROGRESS NOTE ADULT - ASSESSMENT
57-year-old female past medical history of asthma, hypertension, osteomyelitis of left ankle (not on Ab), prediabetes presented for 7-10 day history of difficulty urinating, found to have DILIP, urinary retention.     A/P  # Sepsis POA secondary to Acute UTI /Dental carries/  Abscess adjacent to R mandible  - resolved now   - c/w cefepime and flagyl while in the hospital  - OR Cx is negative, pt needs ID follow up for Abx on discharge   - urine and blood Cx are NTD  - Dental consult  appreciated, OP follow up recommended.     # Left Ankle OM with chronic sinus draining tract  - consulted by ortho, hard wear was  removed on 9/13  - Cefepime and flagyl per ID - will need PICC line, please confirm with ID, pt needs follow up   - PT as tolerated, physiatry called for a consult ( pt is considering 4A)    # Anemia  - monitor H/H, keep Hb above 7.5  - no active bleeding noted   - if Hb drops send anemia work up      #DILIP 2/2 obstructive uropathy/ Acute urinary retention   - failed TOV 9/9 and on 9/14, pt was straight cath overnight  - will get bladder scant today after PT f/u     # HTN  - DASH diet     # Asthma   -USHA PRN      DVT prophylaxis: heparin    Pending:  ID follow up, if IV Abx recommended for 6 weeks, call IR for a PICC line, bladder scan today ( pt failed TOV), discharge planning, pt is considering 4A, physiatry called for a consult   Plan of care d/w patient   Dispo: TBD

## 2023-09-15 NOTE — PROGRESS NOTE ADULT - ASSESSMENT
PHYSICAL EXAM:    GENERAL:   ( x ) NAD, lying in bed comfortably     (  ) obtunded     (  ) lethargic     (  ) somnolent    HEAD:   ( x ) Atraumatic     (  ) hematoma     (  ) laceration (specify location:       )     NECK:  ( x ) Supple     (  ) neck stiffness     (  ) nuchal rigidity     (  )  no JVD     (  ) JVD present ( -- cm)    HEART:  Rate -->     ( x ) normal rate     (  ) bradycardic     (  ) tachycardic  Rhythm -->     ( x ) regular     (  ) regularly irregular     (  ) irregularly irregular  Murmurs -->     ( x ) normal s1s2     (  ) systolic murmur     (  ) diastolic murmur     (  ) continuous murmur      (  ) S3 present     (  ) S4 present    LUNGS:   ( x )Unlabored respirations     (  ) tachypnea  (  ) B/L air entry     (  ) decreased breath sounds in:  (location     )    ( x ) no adventitious sound     (  ) crackles     (  ) wheezing      (  ) rhonchi      (specify location:       )  (  ) chest wall tenderness (specify location:       )    ABDOMEN:   ( x ) Soft     (  ) tense   |   (  ) nondistended     (  ) distended   |   (  ) +BS     (  ) hypoactive bowel sounds     (  ) hyperactive bowel sounds  ( x ) nontender     (  ) RUQ tenderness     (  ) RLQ tenderness     (  ) LLQ tenderness     (  ) epigastric tenderness     (  ) diffuse tenderness  (  ) Splenomegaly      (  ) Hepatomegaly      (  ) Jaundice     (  ) ecchymosis     EXTREMITIES:  (  ) Normal     (  ) Rash     (  ) ecchymosis     (  ) varicose veins      (  ) pitting edema     (  ) non-pitting edema   (  ) ulceration     (  ) gangrene:     (location:     )  LLE:  ·	Splint/Dressing c/d/i  ·	Compartments soft/compressible  ·	SILT of exposed toes  ·	Motor intact EHL/FHL  ·	Digits wwp    NERVOUS SYSTEM:    ( x ) A&Ox3     (  ) confused     (  ) lethargic  CN II-XII:     ( x ) Intact     (  ) deficits found     (Specify:     )   Upper extremities:     ( x ) no sensorimotor deficits     (  ) weakness     (  ) loss of proprioception/vibration     (  ) loss of touch/temperature (specify:    )  Lower extremities:     ( x ) no sensorimotor deficits     (  ) weakness     (  ) loss of proprioception/vibration     (  ) loss of touch/temperature (specify:    )    SKIN:   ( x ) No rashes or lesions     (  ) maculopapular rash     (  ) pustules     (  ) vesicles     (  ) ulcer     (  ) ecchymosis     (specify location:     )    PRIMARY DIAGNOSIS:  57-year-old female past medical history of asthma, hypertension, osteomyelitis of left ankle (not on Ab), prediabetes presented for 7-10 day history of difficulty urinating, found to have urosepsis.     ASSESSMENT:  # Left ankle osteomyelitis with chronic sinus draining duct  # Leukocytosis  # Hypokalemia  # Hypocalcemia  # Hypomagnesemia    PLAN:  ·	Sepsis secondary to Acute UTI (resolved)   ·	Leukocytosis (Resolved)  - WBC Count: 10.66 K/uL (09.15.23 @ 07:44)  - WBC Count: 13.59 K/uL (09.14.23 @ 07:37)  - On Cefepime and Flagyl   - Ucx : NGTD  - Culture: Acid fast, Fungal and Surgical swab : NGTD    ·	Multiple fractured teeth, carious lesions.   - Dental medicine on board  - Patient needs all remaining teeth extracted.   - NO swelling or infection present. No sign of acute infection.   - Patient requires extraction of remaining dentition :  Dental F/U with outpatient dentist for comprehensive dental care.   - Intraoral exam does not indicate that her intraoral condition is related to the sepsis, and does not indicate immediate dental attention.    ·	Hypomagnesemia   - Magnesium: 1.6 mg/dL (09.14.23 @ 07:37)    ·	Hypokalemia (resolved)  - Potassium: 3.9 mmol/L (09.14.23 @ 07:37)  - High potassium diet    ·	Hypocalcemia (Pseudo)  - Calcium: 7.6 mg/dL (09.13.23 @ 11:47)  - Corrected: 9.6     ·	DILIP secondary to obstructive uropathy (resolved)  ·	Acute urinary retention  - resolved (2.4 -> 1.2)  - Straight catheterisation done 7 AM : 810 ml drained; Plan to follow bladder scan at 11 PM    ·	Chronic osteomyelitis of left ankle with retained orthopaedic hardware   - Left ankle infected hardware s/p L ankle ORIF on 8/2022  - XR L ankle : Hardware intact, lucency around screws, fibula fracture at proximal end of plate  - L ankle I&D, removal of hardware (9/13)  - NWB LLE x 2 weeks until wound heals; then if wounds stable plan to transition to WBAT in CAM boot    ·	HTN  - on amlodipine, held due to soft blood pressures, had a hypotensive episode requiring fluid bolus    ·	Asthma   - Albuterol PRN    Code status: Full  Pain control: Tylenol standing and Dilaudid PRN  DVT prophylaxis: Heparin  Lines: None  IV fluids: None  O2 support: None  Antibiotics: Cefepime and Flagyl  Feeds: High potassium diet  Bowel Regimen: None  Bowel Movement: +  Urine Output: Straight catheterisation  Activity: PT Consult  Disposition: SNIF    TO FOLLOW UP:  ·	PT consult   ·	Intermittent catheterisation  ·	Potassium and Magnesium levels  ·	ID consult for OP antibiotics as the cultures are NGTD  ·	F/U with Dr Fernandez 2 week post-op     PHYSICAL EXAM:    GENERAL:   ( x ) NAD, lying in bed comfortably     (  ) obtunded     (  ) lethargic     (  ) somnolent    HEAD:   ( x ) Atraumatic     (  ) hematoma     (  ) laceration (specify location:       )     NECK:  ( x ) Supple     (  ) neck stiffness     (  ) nuchal rigidity     (  )  no JVD     (  ) JVD present ( -- cm)    HEART:  Rate -->     ( x ) normal rate     (  ) bradycardic     (  ) tachycardic  Rhythm -->     ( x ) regular     (  ) regularly irregular     (  ) irregularly irregular  Murmurs -->     ( x ) normal s1s2     (  ) systolic murmur     (  ) diastolic murmur     (  ) continuous murmur      (  ) S3 present     (  ) S4 present    LUNGS:   ( x )Unlabored respirations     (  ) tachypnea  (  ) B/L air entry     (  ) decreased breath sounds in:  (location     )    ( x ) no adventitious sound     (  ) crackles     (  ) wheezing      (  ) rhonchi      (specify location:       )  (  ) chest wall tenderness (specify location:       )    ABDOMEN:   ( x ) Soft     (  ) tense   |   (  ) nondistended     (  ) distended   |   (  ) +BS     (  ) hypoactive bowel sounds     (  ) hyperactive bowel sounds  ( x ) nontender     (  ) RUQ tenderness     (  ) RLQ tenderness     (  ) LLQ tenderness     (  ) epigastric tenderness     (  ) diffuse tenderness  (  ) Splenomegaly      (  ) Hepatomegaly      (  ) Jaundice     (  ) ecchymosis     EXTREMITIES:  (  ) Normal     (  ) Rash     (  ) ecchymosis     (  ) varicose veins      (  ) pitting edema     (  ) non-pitting edema   (  ) ulceration     (  ) gangrene:     (location:     )  LLE:  ·	Splint/Dressing c/d/i  ·	Compartments soft/compressible  ·	SILT of exposed toes  ·	Motor intact EHL/FHL  ·	Digits wwp    NERVOUS SYSTEM:    ( x ) A&Ox3     (  ) confused     (  ) lethargic  CN II-XII:     ( x ) Intact     (  ) deficits found     (Specify:     )   Upper extremities:     ( x ) no sensorimotor deficits     (  ) weakness     (  ) loss of proprioception/vibration     (  ) loss of touch/temperature (specify:    )  Lower extremities:     ( x ) no sensorimotor deficits     (  ) weakness     (  ) loss of proprioception/vibration     (  ) loss of touch/temperature (specify:    )    SKIN:   ( x ) No rashes or lesions     (  ) maculopapular rash     (  ) pustules     (  ) vesicles     (  ) ulcer     (  ) ecchymosis     (specify location:     )    PRIMARY DIAGNOSIS:  57-year-old female past medical history of asthma, hypertension, osteomyelitis of left ankle (not on Ab), prediabetes presented for 7-10 day history of difficulty urinating, found to have urosepsis.     ASSESSMENT:  # Left ankle osteomyelitis with chronic sinus draining duct  # Leukocytosis  # Hypokalemia  # Hypocalcemia  # Hypomagnesemia    PLAN:  ·	Sepsis secondary to Acute UTI (resolved)   ·	Leukocytosis (Resolved)  - WBC Count: 10.66 K/uL (09.15.23 @ 07:44)  - WBC Count: 13.59 K/uL (09.14.23 @ 07:37)  - On Cefepime and Flagyl   - Ucx : NGTD  - Culture: Acid fast, Fungal and Surgical swab : NGTD  - ID consulted: Plan to discharge on 1g Ertapenem 1g OD X 6 weeks    ·	Multiple fractured teeth, carious lesions.   - Dental medicine on board  - Patient needs all remaining teeth extracted.   - NO swelling or infection present. No sign of acute infection.   - Patient requires extraction of remaining dentition :  Dental F/U with outpatient dentist for comprehensive dental care.   - Intraoral exam does not indicate that her intraoral condition is related to the sepsis, and does not indicate immediate dental attention.    ·	Hypomagnesemia   - Magnesium: 1.7 mg/dL (09.15.23 @ 07:44)  - started on magnesium oxide    ·	Hypokalemia (resolved)  - Potassium: 3.9 mmol/L (09.14.23 @ 07:37)  - High potassium diet    ·	Hypocalcemia (Pseudo)  - Calcium: 7.6 mg/dL (09.13.23 @ 11:47)  - Corrected: 9.6     ·	DILIP secondary to obstructive uropathy (resolved)  ·	Acute urinary retention  - resolved (2.4 -> 1.2)  - Straight catheterisation done 7 AM : 810 ml drained; Plan to follow bladder scan at 11 PM  - Straight catheterisation done again in 1 PM    ·	Chronic osteomyelitis of left ankle with retained orthopaedic hardware   - Left ankle infected hardware s/p L ankle ORIF on 8/2022  - XR L ankle : Hardware intact, lucency around screws, fibula fracture at proximal end of plate  - L ankle I&D, removal of hardware (9/13)  - NWB LLE x 2 weeks until wound heals; then if wounds stable plan to transition to WBAT in CAM boot    ·	HTN  - on amlodipine, held due to soft blood pressures, had a hypotensive episode requiring fluid bolus    ·	Asthma   - Albuterol PRN    Code status: Full  Pain control: Tylenol standing and Dilaudid PRN  DVT prophylaxis: Heparin  Lines: None  IV fluids: None  O2 support: None  Antibiotics: Cefepime and Flagyl  Feeds: High potassium diet  Bowel Regimen: None  Bowel Movement: +  Urine Output: Straight catheterisation  Activity: PT Consult  Disposition: SNIF    TO FOLLOW UP:  ·	PT consult   ·	Intermittent catheterisation  ·	Magnesium levels  ·	F/U with Dr Fernandez 2 week post-op (Orthopaedics)

## 2023-09-15 NOTE — PROGRESS NOTE ADULT - ASSESSMENT
ASSESSMENT  57-year-old female past medical history of asthma, hypertension, osteomyelitis of left ankle (not on Ab), prediabetes presented for 7-10 day history of difficulty urinating.    IMPRESSION  #Ascending UTI - Urine Cx NG    #Dental infection with small abscess   - CT Neck Soft Tissue w/ IV Cont (09.07.23 @ 16:54): 1.  Multiple dental caries and periapical lucencies. Recommend follow-up  with dental exam. 2.  Apparent 3 mm rim-enhancing fluid collection overlying the right  mandibular alveolar ridge, potentially a tiny abscess (ser 4 im 87). 3.  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 4.  Scattered groundglass opacities and tree-in-bud nodules. Etiology  favors infection/inflammation.    #OM of left ankle with chroic sinus draining wound   - followed by Ortho - recommended for removal ofhardware and prolonged antibiotic course - scheduled on 9/13  - Xray Foot AP + Lateral + Oblique, Left (09.08.23 @ 13:54): The patient is status post screw and plate fixation of the distal fibula.  There is a subacute or chronic minimally displaced incompletely healed  fracture of the fibula at the proximal margin of the hardware,  demonstrating incomplete bony bridging/callus formation. The hardware is   intact. There is no radiographic evidence of osteomyelitis. No acute fracture is  seen. There is no dislocation.There is no definite ankle mortise  asymmetry. Osteopenia is noted. There is evidence of skin ulceration over the  lateral malleolus/fibular plate.  - s/p debridement with removal of implant -- found to have purulence 9/13  #Abx allergy: penicillin (Anaphylaxis)    RECOMMENDATIONS  - follow-up OR cultures until final  - if Cx eventually show NG, plan for ertapenem 1g daily to complete 6 weeks from washout   - narrow cefepime to ceftriaxone 2g daily here  - continue flagyl 500 mg q 8 hours   - will need PICC line    Please call or message on [employer] Teams if with any questions.  Spectra [phone]

## 2023-09-16 LAB
-  AMPICILLIN/SULBACTAM: SIGNIFICANT CHANGE UP
-  CEFAZOLIN: SIGNIFICANT CHANGE UP
-  CLINDAMYCIN: SIGNIFICANT CHANGE UP
-  ERYTHROMYCIN: SIGNIFICANT CHANGE UP
-  GENTAMICIN: SIGNIFICANT CHANGE UP
-  OXACILLIN: SIGNIFICANT CHANGE UP
-  PENICILLIN: SIGNIFICANT CHANGE UP
-  RIFAMPIN: SIGNIFICANT CHANGE UP
-  TETRACYCLINE: SIGNIFICANT CHANGE UP
-  TRIMETHOPRIM/SULFAMETHOXAZOLE: SIGNIFICANT CHANGE UP
-  VANCOMYCIN: SIGNIFICANT CHANGE UP
ALBUMIN SERPL ELPH-MCNC: 2.5 G/DL — LOW (ref 3.5–5.2)
ALP SERPL-CCNC: 60 U/L — SIGNIFICANT CHANGE UP (ref 30–115)
ALT FLD-CCNC: 5 U/L — SIGNIFICANT CHANGE UP (ref 0–41)
ANION GAP SERPL CALC-SCNC: 10 MMOL/L — SIGNIFICANT CHANGE UP (ref 7–14)
AST SERPL-CCNC: 9 U/L — SIGNIFICANT CHANGE UP (ref 0–41)
BASOPHILS # BLD AUTO: 0.08 K/UL — SIGNIFICANT CHANGE UP (ref 0–0.2)
BASOPHILS NFR BLD AUTO: 0.8 % — SIGNIFICANT CHANGE UP (ref 0–1)
BILIRUB SERPL-MCNC: <0.2 MG/DL — SIGNIFICANT CHANGE UP (ref 0.2–1.2)
BUN SERPL-MCNC: 5 MG/DL — LOW (ref 10–20)
CALCIUM SERPL-MCNC: 8 MG/DL — LOW (ref 8.4–10.5)
CHLORIDE SERPL-SCNC: 109 MMOL/L — SIGNIFICANT CHANGE UP (ref 98–110)
CO2 SERPL-SCNC: 21 MMOL/L — SIGNIFICANT CHANGE UP (ref 17–32)
CREAT SERPL-MCNC: 0.6 MG/DL — LOW (ref 0.7–1.5)
CULTURE RESULTS: SIGNIFICANT CHANGE UP
EGFR: 105 ML/MIN/1.73M2 — SIGNIFICANT CHANGE UP
EOSINOPHIL # BLD AUTO: 0.29 K/UL — SIGNIFICANT CHANGE UP (ref 0–0.7)
EOSINOPHIL NFR BLD AUTO: 2.8 % — SIGNIFICANT CHANGE UP (ref 0–8)
GLUCOSE SERPL-MCNC: 107 MG/DL — HIGH (ref 70–99)
HCT VFR BLD CALC: 26.4 % — LOW (ref 37–47)
HGB BLD-MCNC: 8.8 G/DL — LOW (ref 12–16)
IMM GRANULOCYTES NFR BLD AUTO: 0.4 % — HIGH (ref 0.1–0.3)
LYMPHOCYTES # BLD AUTO: 2.92 K/UL — SIGNIFICANT CHANGE UP (ref 1.2–3.4)
LYMPHOCYTES # BLD AUTO: 28.2 % — SIGNIFICANT CHANGE UP (ref 20.5–51.1)
MAGNESIUM SERPL-MCNC: 1.4 MG/DL — LOW (ref 1.8–2.4)
MCHC RBC-ENTMCNC: 32.5 PG — HIGH (ref 27–31)
MCHC RBC-ENTMCNC: 33.3 G/DL — SIGNIFICANT CHANGE UP (ref 32–37)
MCV RBC AUTO: 97.4 FL — SIGNIFICANT CHANGE UP (ref 81–99)
METHOD TYPE: SIGNIFICANT CHANGE UP
MONOCYTES # BLD AUTO: 0.87 K/UL — HIGH (ref 0.1–0.6)
MONOCYTES NFR BLD AUTO: 8.4 % — SIGNIFICANT CHANGE UP (ref 1.7–9.3)
NEUTROPHILS # BLD AUTO: 6.16 K/UL — SIGNIFICANT CHANGE UP (ref 1.4–6.5)
NEUTROPHILS NFR BLD AUTO: 59.4 % — SIGNIFICANT CHANGE UP (ref 42.2–75.2)
NRBC # BLD: 0 /100 WBCS — SIGNIFICANT CHANGE UP (ref 0–0)
ORGANISM # SPEC MICROSCOPIC CNT: SIGNIFICANT CHANGE UP
ORGANISM # SPEC MICROSCOPIC CNT: SIGNIFICANT CHANGE UP
PLATELET # BLD AUTO: 288 K/UL — SIGNIFICANT CHANGE UP (ref 130–400)
PMV BLD: 10.7 FL — HIGH (ref 7.4–10.4)
POTASSIUM SERPL-MCNC: 4 MMOL/L — SIGNIFICANT CHANGE UP (ref 3.5–5)
POTASSIUM SERPL-SCNC: 4 MMOL/L — SIGNIFICANT CHANGE UP (ref 3.5–5)
PROT SERPL-MCNC: 4.8 G/DL — LOW (ref 6–8)
RBC # BLD: 2.71 M/UL — LOW (ref 4.2–5.4)
RBC # FLD: 14.8 % — HIGH (ref 11.5–14.5)
SODIUM SERPL-SCNC: 140 MMOL/L — SIGNIFICANT CHANGE UP (ref 135–146)
SPECIMEN SOURCE: SIGNIFICANT CHANGE UP
WBC # BLD: 10.36 K/UL — SIGNIFICANT CHANGE UP (ref 4.8–10.8)
WBC # FLD AUTO: 10.36 K/UL — SIGNIFICANT CHANGE UP (ref 4.8–10.8)

## 2023-09-16 PROCEDURE — 99233 SBSQ HOSP IP/OBS HIGH 50: CPT

## 2023-09-16 RX ORDER — MAGNESIUM SULFATE 500 MG/ML
2 VIAL (ML) INJECTION
Refills: 0 | Status: COMPLETED | OUTPATIENT
Start: 2023-09-16 | End: 2023-09-16

## 2023-09-16 RX ADMIN — HYDROMORPHONE HYDROCHLORIDE 1 MILLIGRAM(S): 2 INJECTION INTRAMUSCULAR; INTRAVENOUS; SUBCUTANEOUS at 03:51

## 2023-09-16 RX ADMIN — Medication 975 MILLIGRAM(S): at 21:39

## 2023-09-16 RX ADMIN — PANTOPRAZOLE SODIUM 40 MILLIGRAM(S): 20 TABLET, DELAYED RELEASE ORAL at 06:52

## 2023-09-16 RX ADMIN — MAGNESIUM OXIDE 400 MG ORAL TABLET 400 MILLIGRAM(S): 241.3 TABLET ORAL at 07:59

## 2023-09-16 RX ADMIN — Medication 975 MILLIGRAM(S): at 15:37

## 2023-09-16 RX ADMIN — Medication 3 MILLIGRAM(S): at 21:42

## 2023-09-16 RX ADMIN — Medication 25 GRAM(S): at 12:50

## 2023-09-16 RX ADMIN — Medication 25 GRAM(S): at 14:36

## 2023-09-16 RX ADMIN — HYDROMORPHONE HYDROCHLORIDE 1 MILLIGRAM(S): 2 INJECTION INTRAMUSCULAR; INTRAVENOUS; SUBCUTANEOUS at 21:41

## 2023-09-16 RX ADMIN — HYDROMORPHONE HYDROCHLORIDE 1 MILLIGRAM(S): 2 INJECTION INTRAMUSCULAR; INTRAVENOUS; SUBCUTANEOUS at 14:44

## 2023-09-16 RX ADMIN — HEPARIN SODIUM 5000 UNIT(S): 5000 INJECTION INTRAVENOUS; SUBCUTANEOUS at 06:52

## 2023-09-16 RX ADMIN — Medication 100 MILLIGRAM(S): at 21:39

## 2023-09-16 RX ADMIN — Medication 975 MILLIGRAM(S): at 14:35

## 2023-09-16 RX ADMIN — Medication 975 MILLIGRAM(S): at 22:03

## 2023-09-16 RX ADMIN — HEPARIN SODIUM 5000 UNIT(S): 5000 INJECTION INTRAVENOUS; SUBCUTANEOUS at 17:38

## 2023-09-16 RX ADMIN — HYDROMORPHONE HYDROCHLORIDE 1 MILLIGRAM(S): 2 INJECTION INTRAMUSCULAR; INTRAVENOUS; SUBCUTANEOUS at 22:00

## 2023-09-16 RX ADMIN — HYDROMORPHONE HYDROCHLORIDE 1 MILLIGRAM(S): 2 INJECTION INTRAMUSCULAR; INTRAVENOUS; SUBCUTANEOUS at 15:37

## 2023-09-16 RX ADMIN — CEFTRIAXONE 100 MILLIGRAM(S): 500 INJECTION, POWDER, FOR SOLUTION INTRAMUSCULAR; INTRAVENOUS at 21:39

## 2023-09-16 RX ADMIN — Medication 975 MILLIGRAM(S): at 06:52

## 2023-09-16 RX ADMIN — Medication 100 MILLIGRAM(S): at 14:37

## 2023-09-16 RX ADMIN — Medication 100 MILLIGRAM(S): at 06:52

## 2023-09-16 NOTE — PROGRESS NOTE ADULT - ASSESSMENT
57-year-old female past medical history of asthma, hypertension, osteomyelitis of left ankle (not on Ab), prediabetes presented for 7-10 day history of difficulty urinating, found to have DILIP, urinary retention.     A/P  # Sepsis POA secondary to Acute UTI /Dental carries/  Abscess adjacent to R mandible  - resolved now   - c/w cefepime and flagyl while in the hospital  - OR Cx is negative,  ID recommended IV Abx on discharge  - call IR for PICC line on Monday   - urine and blood Cx are NTD  - Dental consult  appreciated, OP follow up recommended.     # Left Ankle OM with chronic sinus draining tract  - consulted by ortho, hard wear was  removed on 9/13  - Cefepime and flagyl per ID , needs long term Abx   - PT as tolerated, pt is agreeable to SNF now     # Anemia  - monitor H/H, keep Hb above 7.5  - no active bleeding noted   - if Hb drops send anemia work up      #DILIP 2/2 obstructive uropathy/ Acute urinary retention   - failed TOV 9/9 and on 9/14, pt  - c/w  straight cath now     # Hypomagnesemia  - replete Mg     # HTN  - DASH diet     # Asthma   -USHA PRN      DVT prophylaxis: heparin    Pending:  PICC line on Monday, d/c to SNF with IV Abx   Plan of care d/w patient   Dispo: SNF

## 2023-09-17 LAB
ALBUMIN SERPL ELPH-MCNC: 2.3 G/DL — LOW (ref 3.5–5.2)
ALP SERPL-CCNC: 62 U/L — SIGNIFICANT CHANGE UP (ref 30–115)
ALT FLD-CCNC: <5 U/L — SIGNIFICANT CHANGE UP (ref 0–41)
ANION GAP SERPL CALC-SCNC: 10 MMOL/L — SIGNIFICANT CHANGE UP (ref 7–14)
AST SERPL-CCNC: 10 U/L — SIGNIFICANT CHANGE UP (ref 0–41)
BILIRUB SERPL-MCNC: <0.2 MG/DL — SIGNIFICANT CHANGE UP (ref 0.2–1.2)
BUN SERPL-MCNC: 5 MG/DL — LOW (ref 10–20)
CALCIUM SERPL-MCNC: 7.8 MG/DL — LOW (ref 8.4–10.4)
CHLORIDE SERPL-SCNC: 107 MMOL/L — SIGNIFICANT CHANGE UP (ref 98–110)
CO2 SERPL-SCNC: 23 MMOL/L — SIGNIFICANT CHANGE UP (ref 17–32)
CREAT SERPL-MCNC: 0.6 MG/DL — LOW (ref 0.7–1.5)
EGFR: 105 ML/MIN/1.73M2 — SIGNIFICANT CHANGE UP
GLUCOSE SERPL-MCNC: 97 MG/DL — SIGNIFICANT CHANGE UP (ref 70–99)
HCT VFR BLD CALC: 27 % — LOW (ref 37–47)
HGB BLD-MCNC: 8.6 G/DL — LOW (ref 12–16)
MAGNESIUM SERPL-MCNC: 1.9 MG/DL — SIGNIFICANT CHANGE UP (ref 1.8–2.4)
MCHC RBC-ENTMCNC: 31 PG — SIGNIFICANT CHANGE UP (ref 27–31)
MCHC RBC-ENTMCNC: 31.9 G/DL — LOW (ref 32–37)
MCV RBC AUTO: 97.5 FL — SIGNIFICANT CHANGE UP (ref 81–99)
NRBC # BLD: 0 /100 WBCS — SIGNIFICANT CHANGE UP (ref 0–0)
PLATELET # BLD AUTO: 339 K/UL — SIGNIFICANT CHANGE UP (ref 130–400)
PMV BLD: 10.5 FL — HIGH (ref 7.4–10.4)
POTASSIUM SERPL-MCNC: 3.5 MMOL/L — SIGNIFICANT CHANGE UP (ref 3.5–5)
POTASSIUM SERPL-SCNC: 3.5 MMOL/L — SIGNIFICANT CHANGE UP (ref 3.5–5)
PROT SERPL-MCNC: 4.7 G/DL — LOW (ref 6–8)
RBC # BLD: 2.77 M/UL — LOW (ref 4.2–5.4)
RBC # FLD: 14.9 % — HIGH (ref 11.5–14.5)
SODIUM SERPL-SCNC: 140 MMOL/L — SIGNIFICANT CHANGE UP (ref 135–146)
WBC # BLD: 8.78 K/UL — SIGNIFICANT CHANGE UP (ref 4.8–10.8)
WBC # FLD AUTO: 8.78 K/UL — SIGNIFICANT CHANGE UP (ref 4.8–10.8)

## 2023-09-17 PROCEDURE — 99232 SBSQ HOSP IP/OBS MODERATE 35: CPT

## 2023-09-17 RX ADMIN — HEPARIN SODIUM 5000 UNIT(S): 5000 INJECTION INTRAVENOUS; SUBCUTANEOUS at 18:03

## 2023-09-17 RX ADMIN — Medication 3 MILLIGRAM(S): at 22:41

## 2023-09-17 RX ADMIN — Medication 975 MILLIGRAM(S): at 21:51

## 2023-09-17 RX ADMIN — CEFTRIAXONE 100 MILLIGRAM(S): 500 INJECTION, POWDER, FOR SOLUTION INTRAMUSCULAR; INTRAVENOUS at 21:51

## 2023-09-17 RX ADMIN — Medication 975 MILLIGRAM(S): at 05:43

## 2023-09-17 RX ADMIN — HYDROMORPHONE HYDROCHLORIDE 1 MILLIGRAM(S): 2 INJECTION INTRAMUSCULAR; INTRAVENOUS; SUBCUTANEOUS at 22:41

## 2023-09-17 RX ADMIN — Medication 975 MILLIGRAM(S): at 06:08

## 2023-09-17 RX ADMIN — HYDROMORPHONE HYDROCHLORIDE 1 MILLIGRAM(S): 2 INJECTION INTRAMUSCULAR; INTRAVENOUS; SUBCUTANEOUS at 18:40

## 2023-09-17 RX ADMIN — HYDROMORPHONE HYDROCHLORIDE 1 MILLIGRAM(S): 2 INJECTION INTRAMUSCULAR; INTRAVENOUS; SUBCUTANEOUS at 04:11

## 2023-09-17 RX ADMIN — HYDROMORPHONE HYDROCHLORIDE 1 MILLIGRAM(S): 2 INJECTION INTRAMUSCULAR; INTRAVENOUS; SUBCUTANEOUS at 12:12

## 2023-09-17 RX ADMIN — Medication 100 MILLIGRAM(S): at 21:51

## 2023-09-17 RX ADMIN — HEPARIN SODIUM 5000 UNIT(S): 5000 INJECTION INTRAVENOUS; SUBCUTANEOUS at 05:42

## 2023-09-17 RX ADMIN — Medication 975 MILLIGRAM(S): at 22:06

## 2023-09-17 RX ADMIN — HYDROMORPHONE HYDROCHLORIDE 1 MILLIGRAM(S): 2 INJECTION INTRAMUSCULAR; INTRAVENOUS; SUBCUTANEOUS at 04:45

## 2023-09-17 RX ADMIN — Medication 975 MILLIGRAM(S): at 15:02

## 2023-09-17 RX ADMIN — Medication 100 MILLIGRAM(S): at 14:04

## 2023-09-17 RX ADMIN — Medication 100 MILLIGRAM(S): at 05:43

## 2023-09-17 RX ADMIN — HYDROMORPHONE HYDROCHLORIDE 1 MILLIGRAM(S): 2 INJECTION INTRAMUSCULAR; INTRAVENOUS; SUBCUTANEOUS at 12:30

## 2023-09-17 RX ADMIN — HYDROMORPHONE HYDROCHLORIDE 1 MILLIGRAM(S): 2 INJECTION INTRAMUSCULAR; INTRAVENOUS; SUBCUTANEOUS at 23:47

## 2023-09-17 RX ADMIN — PANTOPRAZOLE SODIUM 40 MILLIGRAM(S): 20 TABLET, DELAYED RELEASE ORAL at 05:42

## 2023-09-17 RX ADMIN — Medication 975 MILLIGRAM(S): at 14:02

## 2023-09-17 NOTE — PROGRESS NOTE ADULT - ASSESSMENT
PHYSICAL EXAM:    GENERAL:   ( x ) NAD, lying in bed comfortably     (  ) obtunded     (  ) lethargic     (  ) somnolent    HEAD:   ( x ) Atraumatic     (  ) hematoma     (  ) laceration (specify location:       )     NECK:  ( x ) Supple     (  ) neck stiffness     (  ) nuchal rigidity     (  )  no JVD     (  ) JVD present ( -- cm)    HEART:  Rate -->     ( x ) normal rate     (  ) bradycardic     (  ) tachycardic  Rhythm -->     ( x ) regular     (  ) regularly irregular     (  ) irregularly irregular  Murmurs -->     ( x ) normal s1s2     (  ) systolic murmur     (  ) diastolic murmur     (  ) continuous murmur      (  ) S3 present     (  ) S4 present    LUNGS:   ( x )Unlabored respirations     (  ) tachypnea  (  ) B/L air entry     (  ) decreased breath sounds in:  (location     )    ( x ) no adventitious sound     (  ) crackles     (  ) wheezing      (  ) rhonchi      (specify location:       )  (  ) chest wall tenderness (specify location:       )    ABDOMEN:   ( x ) Soft     (  ) tense   |   (  ) nondistended     (  ) distended   |   (  ) +BS     (  ) hypoactive bowel sounds     (  ) hyperactive bowel sounds  ( x ) nontender     (  ) RUQ tenderness     (  ) RLQ tenderness     (  ) LLQ tenderness     (  ) epigastric tenderness     (  ) diffuse tenderness  (  ) Splenomegaly      (  ) Hepatomegaly      (  ) Jaundice     (  ) ecchymosis     EXTREMITIES:  (  ) Normal     (  ) Rash     (  ) ecchymosis     (  ) varicose veins      (  ) pitting edema     (  ) non-pitting edema   (  ) ulceration     (  ) gangrene:     (location:     )  LLE:  ·	Splint/Dressing c/d/i  ·	Compartments soft/compressible  ·	SILT of exposed toes  ·	Motor intact EHL/FHL  ·	Digits wwp    NERVOUS SYSTEM:    ( x ) A&Ox3     (  ) confused     (  ) lethargic  CN II-XII:     ( x ) Intact     (  ) deficits found     (Specify:     )   Upper extremities:     ( x ) no sensorimotor deficits     (  ) weakness     (  ) loss of proprioception/vibration     (  ) loss of touch/temperature (specify:    )  Lower extremities:     ( x ) no sensorimotor deficits     (  ) weakness     (  ) loss of proprioception/vibration     (  ) loss of touch/temperature (specify:    )    SKIN:   ( x ) No rashes or lesions     (  ) maculopapular rash     (  ) pustules     (  ) vesicles     (  ) ulcer     (  ) ecchymosis     (specify location:     )    PRIMARY DIAGNOSIS:  57-year-old female past medical history of asthma, hypertension, osteomyelitis of left ankle (not on Ab), prediabetes presented for 7-10 day history of difficulty urinating, found to have urosepsis.     ASSESSMENT:  # Left ankle osteomyelitis with chronic sinus draining duct  # Leukocytosis  # Hypokalemia  # Hypocalcemia  # Hypomagnesemia    PLAN:  ·	Sepsis secondary to Acute UTI (resolved)   ·	Leukocytosis (Resolved)  - WBC Count: 10.66 K/uL (09.15.23 @ 07:44)  - WBC Count: 13.59 K/uL (09.14.23 @ 07:37)  - On Cefepime and Flagyl   - Ucx : NGTD  - Culture: Acid fast, Fungal and Surgical swab : NGTD  - ID consulted: Plan to discharge on 1g Ertapenem 1g OD X 6 weeks    ·	Multiple fractured teeth, carious lesions.   - Dental medicine on board  - Patient needs all remaining teeth extracted.   - NO swelling or infection present. No sign of acute infection.   - Patient requires extraction of remaining dentition :  Dental F/U with outpatient dentist for comprehensive dental care.   - Intraoral exam does not indicate that her intraoral condition is related to the sepsis, and does not indicate immediate dental attention.    ·	Hypomagnesemia   - Magnesium: 1.7 mg/dL (09.15.23 @ 07:44)  - started on magnesium oxide    ·	Hypokalemia (resolved)  - Potassium: 3.9 mmol/L (09.14.23 @ 07:37)  - High potassium diet    ·	Hypocalcemia (Pseudo)  - Calcium: 7.6 mg/dL (09.13.23 @ 11:47)  - Corrected: 9.6     ·	DILIP secondary to obstructive uropathy (resolved)  ·	Acute urinary retention  - resolved (2.4 -> 1.2)  - Straight catheterisation done 7 AM : 810 ml drained; Plan to follow bladder scan at 11 PM  - Straight catheterisation done again in 1 PM    ·	Chronic osteomyelitis of left ankle with retained orthopaedic hardware   - Left ankle infected hardware s/p L ankle ORIF on 8/2022  - XR L ankle : Hardware intact, lucency around screws, fibula fracture at proximal end of plate  - L ankle I&D, removal of hardware (9/13)  - NWB LLE x 2 weeks until wound heals; then if wounds stable plan to transition to WBAT in CAM boot    ·	HTN  - on amlodipine, held due to soft blood pressures, had a hypotensive episode requiring fluid bolus    ·	Asthma   - Albuterol PRN    Code status: Full  Pain control: Tylenol standing and Dilaudid PRN  DVT prophylaxis: Heparin  Lines: None  IV fluids: None  O2 support: None  Antibiotics: Cefepime and Flagyl  Feeds: High potassium diet  Bowel Regimen: None  Bowel Movement: +  Urine Output: Straight catheterisation  Activity: PT Consult  Disposition: SNIF    While in the hospital pt failed TOV twice, getting straight cath now.   Today pt is comfortable, denies any specific complaints, agreeable for SNF now.     TO FOLLOW UP:  ·	PICC line on Monday,   ·	d/c to SNF with IV Abx

## 2023-09-18 LAB
ALBUMIN SERPL ELPH-MCNC: 2.6 G/DL — LOW (ref 3.5–5.2)
ALP SERPL-CCNC: 63 U/L — SIGNIFICANT CHANGE UP (ref 30–115)
ALT FLD-CCNC: <5 U/L — SIGNIFICANT CHANGE UP (ref 0–41)
ANION GAP SERPL CALC-SCNC: 10 MMOL/L — SIGNIFICANT CHANGE UP (ref 7–14)
AST SERPL-CCNC: 10 U/L — SIGNIFICANT CHANGE UP (ref 0–41)
BILIRUB SERPL-MCNC: <0.2 MG/DL — SIGNIFICANT CHANGE UP (ref 0.2–1.2)
BUN SERPL-MCNC: 6 MG/DL — LOW (ref 10–20)
CA-I BLD-SCNC: 4.7 MG/DL — SIGNIFICANT CHANGE UP (ref 4.5–5.6)
CALCIUM SERPL-MCNC: 8.1 MG/DL — LOW (ref 8.4–10.5)
CHLORIDE SERPL-SCNC: 109 MMOL/L — SIGNIFICANT CHANGE UP (ref 98–110)
CO2 SERPL-SCNC: 23 MMOL/L — SIGNIFICANT CHANGE UP (ref 17–32)
CREAT SERPL-MCNC: 0.6 MG/DL — LOW (ref 0.7–1.5)
EGFR: 105 ML/MIN/1.73M2 — SIGNIFICANT CHANGE UP
GLUCOSE SERPL-MCNC: 84 MG/DL — SIGNIFICANT CHANGE UP (ref 70–99)
HCT VFR BLD CALC: 26.4 % — LOW (ref 37–47)
HGB BLD-MCNC: 8.5 G/DL — LOW (ref 12–16)
MAGNESIUM SERPL-MCNC: 1.6 MG/DL — LOW (ref 1.8–2.4)
MCHC RBC-ENTMCNC: 31.7 PG — HIGH (ref 27–31)
MCHC RBC-ENTMCNC: 32.2 G/DL — SIGNIFICANT CHANGE UP (ref 32–37)
MCV RBC AUTO: 98.5 FL — SIGNIFICANT CHANGE UP (ref 81–99)
NRBC # BLD: 0 /100 WBCS — SIGNIFICANT CHANGE UP (ref 0–0)
PLATELET # BLD AUTO: 362 K/UL — SIGNIFICANT CHANGE UP (ref 130–400)
PMV BLD: 10.1 FL — SIGNIFICANT CHANGE UP (ref 7.4–10.4)
POTASSIUM SERPL-MCNC: 3.6 MMOL/L — SIGNIFICANT CHANGE UP (ref 3.5–5)
POTASSIUM SERPL-SCNC: 3.6 MMOL/L — SIGNIFICANT CHANGE UP (ref 3.5–5)
PROT SERPL-MCNC: 4.8 G/DL — LOW (ref 6–8)
RBC # BLD: 2.68 M/UL — LOW (ref 4.2–5.4)
RBC # FLD: 15.2 % — HIGH (ref 11.5–14.5)
SODIUM SERPL-SCNC: 142 MMOL/L — SIGNIFICANT CHANGE UP (ref 135–146)
WBC # BLD: 7.09 K/UL — SIGNIFICANT CHANGE UP (ref 4.8–10.8)
WBC # FLD AUTO: 7.09 K/UL — SIGNIFICANT CHANGE UP (ref 4.8–10.8)

## 2023-09-18 PROCEDURE — 36573 INSJ PICC RS&I 5 YR+: CPT

## 2023-09-18 PROCEDURE — 99232 SBSQ HOSP IP/OBS MODERATE 35: CPT

## 2023-09-18 RX ORDER — CEFAZOLIN SODIUM 1 G
2000 VIAL (EA) INJECTION EVERY 8 HOURS
Refills: 0 | Status: DISCONTINUED | OUTPATIENT
Start: 2023-09-18 | End: 2023-09-19

## 2023-09-18 RX ORDER — MAGNESIUM SULFATE 500 MG/ML
2 VIAL (ML) INJECTION
Refills: 0 | Status: COMPLETED | OUTPATIENT
Start: 2023-09-18 | End: 2023-09-18

## 2023-09-18 RX ADMIN — Medication 975 MILLIGRAM(S): at 06:40

## 2023-09-18 RX ADMIN — Medication 100 MILLIGRAM(S): at 05:37

## 2023-09-18 RX ADMIN — Medication 25 GRAM(S): at 13:09

## 2023-09-18 RX ADMIN — Medication 975 MILLIGRAM(S): at 21:27

## 2023-09-18 RX ADMIN — Medication 25 GRAM(S): at 12:07

## 2023-09-18 RX ADMIN — HYDROMORPHONE HYDROCHLORIDE 1 MILLIGRAM(S): 2 INJECTION INTRAMUSCULAR; INTRAVENOUS; SUBCUTANEOUS at 22:48

## 2023-09-18 RX ADMIN — HEPARIN SODIUM 5000 UNIT(S): 5000 INJECTION INTRAVENOUS; SUBCUTANEOUS at 05:37

## 2023-09-18 RX ADMIN — CHLORHEXIDINE GLUCONATE 1 APPLICATION(S): 213 SOLUTION TOPICAL at 06:40

## 2023-09-18 RX ADMIN — HYDROMORPHONE HYDROCHLORIDE 1 MILLIGRAM(S): 2 INJECTION INTRAMUSCULAR; INTRAVENOUS; SUBCUTANEOUS at 03:11

## 2023-09-18 RX ADMIN — HYDROMORPHONE HYDROCHLORIDE 1 MILLIGRAM(S): 2 INJECTION INTRAMUSCULAR; INTRAVENOUS; SUBCUTANEOUS at 21:26

## 2023-09-18 RX ADMIN — Medication 3 MILLIGRAM(S): at 21:29

## 2023-09-18 RX ADMIN — Medication 975 MILLIGRAM(S): at 22:00

## 2023-09-18 RX ADMIN — Medication 975 MILLIGRAM(S): at 17:09

## 2023-09-18 RX ADMIN — Medication 975 MILLIGRAM(S): at 05:37

## 2023-09-18 RX ADMIN — Medication 200 MILLIGRAM(S): at 21:27

## 2023-09-18 RX ADMIN — HYDROMORPHONE HYDROCHLORIDE 1 MILLIGRAM(S): 2 INJECTION INTRAMUSCULAR; INTRAVENOUS; SUBCUTANEOUS at 16:17

## 2023-09-18 RX ADMIN — HEPARIN SODIUM 5000 UNIT(S): 5000 INJECTION INTRAVENOUS; SUBCUTANEOUS at 17:10

## 2023-09-18 NOTE — PROGRESS NOTE ADULT - ASSESSMENT
57-year-old female past medical history of asthma, hypertension, osteomyelitis of left ankle (not on Ab), prediabetes presented for 7-10 day history of difficulty urinating, found to have urosepsis.     ASSESSMENT:  # Left ankle osteomyelitis with chronic sinus draining duct  # Leukocytosis  # Hypokalemia  # Hypocalcemia  # Hypomagnesemia    PLAN:  Sepsis secondary to Acute UTI (resolved)   Leukocytosis (Resolved)  -WBC count 7.09 k  - WBC Count: 10.66 K/uL (09.15.23 @ 07:44)  - WBC Count: 13.59 K/uL (09.14.23 @ 07:37)  - On Cefepime and Flagyl   - Ucx : NGTD  - Culture: Acid fast, Fungal and Surgical swab : NGTD  - ID consulted: Plan to discharge on 2g cefazolin q8hrs 6 wk course from debridement    Multiple fractured teeth, carious lesions.   - Dental medicine on board  - Patient needs all remaining teeth extracted.   - NO swelling or infection present. No sign of acute infection.   - Patient requires extraction of remaining dentition :  Dental F/U with outpatient dentist for comprehensive dental care.   - Intraoral exam does not indicate that her intraoral condition is related to the sepsis, and does not indicate immediate dental attention.    Hypomagnesemia   - Magnesium: 1.6  - started on magnesium oxide    Hypokalemia (resolved)  - Potassium: 3.6  - High potassium diet    Hypocalcemia (Pseudo)  - Calcium: 7.6 mg/dL (09.13.23 @ 11:47)  - Corrected: 9.6     DILIP secondary to obstructive uropathy (resolved)  Acute urinary retention  - resolved (2.4 -> 1.2)  - Straight catheterisation done 7 AM : 810 ml drained; Plan to follow bladder scan at 11 PM  - Straight catheterisation done again in 1 PM    Chronic osteomyelitis of left ankle with retained orthopaedic hardware   - Left ankle infected hardware s/p L ankle ORIF on 8/2022  - XR L ankle : Hardware intact, lucency around screws, fibula fracture at proximal end of plate  - L ankle I&D, removal of hardware (9/13)  - NWB LLE x 2 weeks until wound heals; then if wounds stable plan to transition to WBAT in CAM boot    HTN  - on amlodipine, held due to soft blood pressures, had a hypotensive episode requiring fluid bolus    Asthma   - Albuterol PRN    Code status: Full  Pain control: Tylenol standing and Dilaudid PRN  DVT prophylaxis: Heparin  Lines: None  IV fluids: None  O2 support: None  Antibiotics: Cefepime and Flagyl  Feeds: High potassium diet  Bowel Regimen: None  Bowel Movement: +  Urine Output: Straight catheterisation  Activity: PT Consult  Disposition: SNIF    While in the hospital pt failed TOV twice, getting straight cath now.   Today pt is comfortable, denies any specific complaints, agreeable for SNF now.     TO FOLLOW UP:    d/c to SNF with IV Abx

## 2023-09-18 NOTE — PROGRESS NOTE ADULT - ASSESSMENT
57-year-old female past medical history of asthma, hypertension, osteomyelitis of left ankle (not on Ab), prediabetes presented for 7-10 day history of difficulty urinating, found to have DILIP, urinary retention.     A/P  # Sepsis POA secondary to Acute UTI /Dental carries/  Abscess adjacent to R mandible  - resolved now   - ID is following, recommendations noted:  -Switch antibiotics to cefazolin 2g  8 hours (allergies reviewed - cefazolin does not share side chain similarities with penicillin)  - Plan for 6 week course from debridement   - call IR for PICC line today   - Dental consult  appreciated, OP follow up recommended.     # Left Ankle OM with chronic sinus draining tract  - consulted by ortho, hard wear was  removed on 9/13  - c/w Abx as per ID, see above   - PT as tolerated, pt is agreeable to SNF now     # Anemia  - monitor H/H, keep Hb above 7.5  - no active bleeding noted   - if Hb drops send anemia work up      #DILIP 2/2 obstructive uropathy/ Acute urinary retention   - failed TOV 9/9 and on 9/14, pt  - c/w  straight cath now     # Hypomagnesemia  - replete Mg     # HTN  - DASH diet     # Asthma   -USHA PRN      DVT prophylaxis: heparin    Pending:  PICC line today, d/c to SNF with IV Abx in 24 hours, anticipate discharge.   Plan of care d/w patient   Dispo: SNF

## 2023-09-18 NOTE — PROCEDURE NOTE - ADDITIONAL PROCEDURE DETAILS
Successful placement of single lumen 5F Bard right upper extremity PICC terminating in the cavoatrial junction, ready for immediate use.

## 2023-09-19 ENCOUNTER — TRANSCRIPTION ENCOUNTER (OUTPATIENT)
Age: 58
End: 2023-09-19

## 2023-09-19 LAB
-  AMPICILLIN/SULBACTAM: SIGNIFICANT CHANGE UP
-  CEFAZOLIN: SIGNIFICANT CHANGE UP
-  CLINDAMYCIN: SIGNIFICANT CHANGE UP
-  ERYTHROMYCIN: SIGNIFICANT CHANGE UP
-  GENTAMICIN: SIGNIFICANT CHANGE UP
-  OXACILLIN: SIGNIFICANT CHANGE UP
-  PENICILLIN: SIGNIFICANT CHANGE UP
-  RIFAMPIN: SIGNIFICANT CHANGE UP
-  TETRACYCLINE: SIGNIFICANT CHANGE UP
-  TRIMETHOPRIM/SULFAMETHOXAZOLE: SIGNIFICANT CHANGE UP
-  VANCOMYCIN: SIGNIFICANT CHANGE UP
ALBUMIN SERPL ELPH-MCNC: 2.6 G/DL — LOW (ref 3.5–5.2)
ALP SERPL-CCNC: 69 U/L — SIGNIFICANT CHANGE UP (ref 30–115)
ALT FLD-CCNC: <5 U/L — SIGNIFICANT CHANGE UP (ref 0–41)
ANION GAP SERPL CALC-SCNC: 12 MMOL/L — SIGNIFICANT CHANGE UP (ref 7–14)
AST SERPL-CCNC: 12 U/L — SIGNIFICANT CHANGE UP (ref 0–41)
BASOPHILS # BLD AUTO: 0.05 K/UL — SIGNIFICANT CHANGE UP (ref 0–0.2)
BASOPHILS NFR BLD AUTO: 0.5 % — SIGNIFICANT CHANGE UP (ref 0–1)
BILIRUB SERPL-MCNC: <0.2 MG/DL — SIGNIFICANT CHANGE UP (ref 0.2–1.2)
BUN SERPL-MCNC: 6 MG/DL — LOW (ref 10–20)
CALCIUM SERPL-MCNC: 8.4 MG/DL — SIGNIFICANT CHANGE UP (ref 8.4–10.4)
CHLORIDE SERPL-SCNC: 107 MMOL/L — SIGNIFICANT CHANGE UP (ref 98–110)
CO2 SERPL-SCNC: 22 MMOL/L — SIGNIFICANT CHANGE UP (ref 17–32)
CREAT SERPL-MCNC: 0.6 MG/DL — LOW (ref 0.7–1.5)
CULTURE RESULTS: NO GROWTH — SIGNIFICANT CHANGE UP
CULTURE RESULTS: SIGNIFICANT CHANGE UP
EGFR: 105 ML/MIN/1.73M2 — SIGNIFICANT CHANGE UP
EOSINOPHIL # BLD AUTO: 0.16 K/UL — SIGNIFICANT CHANGE UP (ref 0–0.7)
EOSINOPHIL NFR BLD AUTO: 1.6 % — SIGNIFICANT CHANGE UP (ref 0–8)
GLUCOSE SERPL-MCNC: 104 MG/DL — HIGH (ref 70–99)
HCT VFR BLD CALC: 26.9 % — LOW (ref 37–47)
HGB BLD-MCNC: 8.9 G/DL — LOW (ref 12–16)
IMM GRANULOCYTES NFR BLD AUTO: 0.3 % — SIGNIFICANT CHANGE UP (ref 0.1–0.3)
LYMPHOCYTES # BLD AUTO: 2.64 K/UL — SIGNIFICANT CHANGE UP (ref 1.2–3.4)
LYMPHOCYTES # BLD AUTO: 26.4 % — SIGNIFICANT CHANGE UP (ref 20.5–51.1)
MAGNESIUM SERPL-MCNC: 1.4 MG/DL — LOW (ref 1.8–2.4)
MCHC RBC-ENTMCNC: 31.9 PG — HIGH (ref 27–31)
MCHC RBC-ENTMCNC: 33.1 G/DL — SIGNIFICANT CHANGE UP (ref 32–37)
MCV RBC AUTO: 96.4 FL — SIGNIFICANT CHANGE UP (ref 81–99)
METHOD TYPE: SIGNIFICANT CHANGE UP
MONOCYTES # BLD AUTO: 0.77 K/UL — HIGH (ref 0.1–0.6)
MONOCYTES NFR BLD AUTO: 7.7 % — SIGNIFICANT CHANGE UP (ref 1.7–9.3)
NEUTROPHILS # BLD AUTO: 6.35 K/UL — SIGNIFICANT CHANGE UP (ref 1.4–6.5)
NEUTROPHILS NFR BLD AUTO: 63.5 % — SIGNIFICANT CHANGE UP (ref 42.2–75.2)
NRBC # BLD: 0 /100 WBCS — SIGNIFICANT CHANGE UP (ref 0–0)
ORGANISM # SPEC MICROSCOPIC CNT: SIGNIFICANT CHANGE UP
ORGANISM # SPEC MICROSCOPIC CNT: SIGNIFICANT CHANGE UP
PLATELET # BLD AUTO: 424 K/UL — HIGH (ref 130–400)
PMV BLD: 9.9 FL — SIGNIFICANT CHANGE UP (ref 7.4–10.4)
POTASSIUM SERPL-MCNC: 3.6 MMOL/L — SIGNIFICANT CHANGE UP (ref 3.5–5)
POTASSIUM SERPL-SCNC: 3.6 MMOL/L — SIGNIFICANT CHANGE UP (ref 3.5–5)
PROT SERPL-MCNC: 5.4 G/DL — LOW (ref 6–8)
RBC # BLD: 2.79 M/UL — LOW (ref 4.2–5.4)
RBC # FLD: 15 % — HIGH (ref 11.5–14.5)
SARS-COV-2 RNA SPEC QL NAA+PROBE: SIGNIFICANT CHANGE UP
SODIUM SERPL-SCNC: 141 MMOL/L — SIGNIFICANT CHANGE UP (ref 135–146)
SPECIMEN SOURCE: SIGNIFICANT CHANGE UP
SPECIMEN SOURCE: SIGNIFICANT CHANGE UP
WBC # BLD: 10 K/UL — SIGNIFICANT CHANGE UP (ref 4.8–10.8)
WBC # FLD AUTO: 10 K/UL — SIGNIFICANT CHANGE UP (ref 4.8–10.8)

## 2023-09-19 PROCEDURE — 99232 SBSQ HOSP IP/OBS MODERATE 35: CPT

## 2023-09-19 RX ORDER — CEFAZOLIN SODIUM 1 G
2 VIAL (EA) INJECTION
Qty: 210 | Refills: 0
Start: 2023-09-19 | End: 2023-10-23

## 2023-09-19 RX ORDER — CEFAZOLIN SODIUM 1 G
2 VIAL (EA) INJECTION
Qty: 0 | Refills: 0
Start: 2023-09-19

## 2023-09-19 RX ADMIN — Medication 975 MILLIGRAM(S): at 05:38

## 2023-09-19 RX ADMIN — HEPARIN SODIUM 5000 UNIT(S): 5000 INJECTION INTRAVENOUS; SUBCUTANEOUS at 05:38

## 2023-09-19 RX ADMIN — HYDROMORPHONE HYDROCHLORIDE 1 MILLIGRAM(S): 2 INJECTION INTRAMUSCULAR; INTRAVENOUS; SUBCUTANEOUS at 01:41

## 2023-09-19 RX ADMIN — HYDROMORPHONE HYDROCHLORIDE 1 MILLIGRAM(S): 2 INJECTION INTRAMUSCULAR; INTRAVENOUS; SUBCUTANEOUS at 15:01

## 2023-09-19 RX ADMIN — Medication 975 MILLIGRAM(S): at 13:28

## 2023-09-19 RX ADMIN — HYDROMORPHONE HYDROCHLORIDE 1 MILLIGRAM(S): 2 INJECTION INTRAMUSCULAR; INTRAVENOUS; SUBCUTANEOUS at 09:30

## 2023-09-19 RX ADMIN — Medication 975 MILLIGRAM(S): at 05:57

## 2023-09-19 RX ADMIN — Medication 200 MILLIGRAM(S): at 13:28

## 2023-09-19 RX ADMIN — HYDROMORPHONE HYDROCHLORIDE 1 MILLIGRAM(S): 2 INJECTION INTRAMUSCULAR; INTRAVENOUS; SUBCUTANEOUS at 02:15

## 2023-09-19 RX ADMIN — Medication 200 MILLIGRAM(S): at 05:38

## 2023-09-19 RX ADMIN — HEPARIN SODIUM 5000 UNIT(S): 5000 INJECTION INTRAVENOUS; SUBCUTANEOUS at 17:27

## 2023-09-19 NOTE — CHART NOTE - NSCHARTNOTEFT_GEN_A_CORE
Chart reviewed     Cultures are growing rare Staph aureus.     Follow-up to see if MSSA or MSSA     Continue current antibiotics for now    Please call or message on [employer] Teams if with any questions.  Spectra [phone]
Chart reviewed.     OR Cx growing MSSA    Switch antibiotics to cefazolin 2g  8 hours (allergies reviewed - cefazolin does not share side chain similarities with penicillin)  Plan for 6 week course from debridement     Please call or message on [employer] Teams if with any questions.  Spectra [phone]
pt seen at bedside   wound check performed   sutures in place c/d/i   no swelling or erythema   xeroform gauze and webrill placed   ace wrap placed   instructions for follow up given directly to patient and resident   fu dr vaughn 1 week
Blood pressure: 95/52  Heart rate: 67  Patient oriented and stable on appearance and chest, neurological and abdominal examinations  1 L fluid bolus NS was given  CBC and Type and screen drawn stat  Hemoglobin stable  Continued close monitoring
PATIENT'S PROCEDURE POSTPONED TO TOMORROW DUE TO LEVEL 1 TRAUMA BUMPING CASE THIS AFTERNOON. DISCUSSION WAS HAD WITH PATIENT, WHO AGREED TO HAVE PROCEDURE DONE TOMORROW WITH DR. YU.    -ADDED ON FOR L ANKLE I&D, REMOVAL OF HARDWARE,, APPLICATION OF ANTIBIOTIIC BEADS WITH DR. YU 9/13  -PLEASE KEEP NPO AT MIDNIGHT WITH IV FLUIDS  -PLEASE REPLETE MG AND K AND OBTAIN REPEAT CMP

## 2023-09-19 NOTE — DISCHARGE NOTE PROVIDER - NSDCCPCAREPLAN_GEN_ALL_CORE_FT
PRINCIPAL DISCHARGE DIAGNOSIS  Diagnosis: Sepsis  Assessment and Plan of Treatment: You presented to the hospital after being unable to urinate for 7-10 days. In the hospital you were found to have an acute kidney injury with urinary retention. However, you were found to have an elevated white blood cell count from a probable infection. This infection likely arose from your osteomyelitis of left ankle and infected hard ware. Orthopedics removed this hardware on 9/13.   You will need to be on antibiotics after leaving the hospital for several weeks. You will receive these antibiotics through the picc line that was placed by interventional radiology. You will see orthopedics as an outpatient 1 week post-discharge for a wound check.      SECONDARY DISCHARGE DIAGNOSES  Diagnosis: DILIP (acute kidney injury)  Assessment and Plan of Treatment:     Diagnosis: UTI (urinary tract infection)  Assessment and Plan of Treatment:     Diagnosis: Dental abscess  Assessment and Plan of Treatment:     Diagnosis: Cerebral aneurysm  Assessment and Plan of Treatment:

## 2023-09-19 NOTE — DISCHARGE NOTE PROVIDER - HOSPITAL COURSE
57-year-old female past medical history of asthma, hypertension, osteomyelitis of left ankle (not on Ab), prediabetes presented for 7-10 day history of difficulty urinating. She reported having pain initiating the stream, and only having a few drops of urine come out at at time. No pain while urinating. No hematuria. This had been associated with 1 week of nausea/ vomiting clear liquid and inability to eat. She reported losing 5-10 pounds over the past week. No abdominal pain. No reported fever / chills. No diarrhea. ROS also positive for productive cough (whitish sputum) over the past week + sore throat. No chest pain / SOB. No sick contacts. No recent hospitalization or antibiotic use. She has 2 previous episodes of UTI (one of which she got admitted to [facility] for). She also has osteomyelitis of her R ankle, not on treatment, was planned for surgery + biopsy before she got sick.      In ED, vitals showed: BP: 100/65, HR:127, RR: 18, temp: 97.3, spO2: 98% --> HR improved with fluids  Labs significant for WBC: 21K, creatinine: 3.9, Na: 130, K:5.9, bicarb: 13, A, lactate 1.6   UA: positive for blood and LE  - CT:Diffuse urinary bladder wall thickening, mucosal hyper enhancement, compatible with urinary bladder infection, cystitis. Increased right renal pelvis and ureter urothelial enhancement, compatible with ascending urinary tract infection.  - CT Neck: Dental caries and odontogenic disease. Impacted right molar tooth associated with periapical lucency and buccal dehiscence. Small 3 x 3 mm  abscess is visualized adjacent to the right mandible. Left middle cerebral artery, M2 segment 7 mm aneurysm. Scattered ground glass opacities and tree-in-bud nodules. Etiology   favors infection/inflammation.    s/p 1L LR _ ceftriaxone, admitted to medicine  The pt was started on cefepime 1g q 12 hours and flagyl 500 mg q 8 hours. ID was consulted and recommended removal of hardware from left ankle over concern for OM. Xray of left foot showed   status post screw and plate fixation of the distal fibula.  There is a subacute or chronic minimally displaced incompletely healed  fracture of the fibula at the proximal margin of the hardware,  demonstrating incomplete bony bridging/callus formation. The hardware is   intact. There is no radiographic evidence of osteomyelitis. No acute fracture is  seen. There is no dislocation. There is no definite ankle mortise  asymmetry. Osteopenia is noted. There is evidence of skin ulceration over the  lateral malleolus/fibular plate.     The hardware was removed by orthopedics on . Operative findings included a stable ankle, fibrous union, purulence about fibrous nonunion; syndesmosis stable. The pt is to remain NWB x 2 weeks. Then if wounds stable, WBAT in cam BOOT.    She was found to have DILIP secondary to urinary retention, requiring repeat straight catheterizations. However, urine cx was negative and the infection was mostly likely originating from removed LLE infected hard ware.  ID recommended ertapenem 1g daily to complete 6 weeks from washout if surgical cx showed no growth. However, the cx grew rare Staph aureus. A picc line was placed by IR for long-term abx usage. Antibiotics were switched to cefazolin 2g q 8 hours (allergies reviewed - cefazolin does not share side chain similarities with penicillin)  Plan for 6 week course from debridement.     The pt's DILIP resolved w/ straight catheterization.     57-year-old female past medical history of asthma, hypertension, osteomyelitis of left ankle (not on Ab), prediabetes presented for 7-10 day history of difficulty urinating. She reported having pain initiating the stream, and only having a few drops of urine come out at at time. No pain while urinating. No hematuria. This had been associated with 1 week of nausea/ vomiting clear liquid and inability to eat. She reported losing 5-10 pounds over the past week. No abdominal pain. No reported fever / chills. No diarrhea. ROS also positive for productive cough (whitish sputum) over the past week + sore throat. No chest pain / SOB. No sick contacts. No recent hospitalization or antibiotic use. She has 2 previous episodes of UTI (one of which she got admitted to [facility] [place] for). She also has osteomyelitis of her R ankle, not on treatment, was planned for surgery + biopsy before she got sick.      In ED, vitals showed: BP: 100/65, HR:127, RR: 18, temp: 97.3, spO2: 98% --> HR improved with fluids  Labs significant for WBC: 21K, creatinine: 3.9, Na: 130, K:5.9, bicarb: 13, A, lactate 1.6   UA: positive for blood and LE  - CT:Diffuse urinary bladder wall thickening, mucosal hyper enhancement, compatible with urinary bladder infection, cystitis. Increased right renal pelvis and ureter urothelial enhancement, compatible with ascending urinary tract infection.  - CT Neck: Dental caries and odontogenic disease. Impacted right molar tooth associated with periapical lucency and buccal dehiscence. Small 3 x 3 mm  abscess is visualized adjacent to the right mandible. Left middle cerebral artery, M2 segment 7 mm aneurysm. Scattered ground glass opacities and tree-in-bud nodules. Etiology   favors infection/inflammation.    s/p 1L LR _ ceftriaxone, admitted to medicine  The pt was started on cefepime 1g q 12 hours and flagyl 500 mg q 8 hours. ID was consulted and recommended removal of hardware from left ankle over concern for OM. Xray of left foot showed   status post screw and plate fixation of the distal fibula.  There is a subacute or chronic minimally displaced incompletely healed  fracture of the fibula at the proximal margin of the hardware,  demonstrating incomplete bony bridging/callus formation. The hardware is   intact. There is no radiographic evidence of osteomyelitis. No acute fracture is  seen. There is no dislocation. There is no definite ankle mortise  asymmetry. Osteopenia is noted. There is evidence of skin ulceration over the  lateral malleolus/fibular plate.     The hardware was removed by orthopedics on . Operative findings included a stable ankle, fibrous union, purulence about fibrous nonunion; syndesmosis stable. The pt is to remain NWB x 2 weeks. Then if wounds stable, WBAT in cam BOOT.    Patient was seen by dentistry and found to require extraction of remaining dentition. Intraoral exam does not indicate that her intraoral condition is related to the sepsis, and does not indicate immediate dental attention. Patient needs comprehensive dental care, but no emergent work is necessary. Patient to followup with outside dentist for full extractions and denture fabrication.       RECOMMENDATIONS:  2) Dental F/U with outpatient dentist for comprehensive dental care.   3) If any difficulty swallowing/breathing, fever occur, return to ER.     She was found to have DILIP secondary to urinary retention, requiring repeat straight catheterizations. However, urine cx was negative and the infection was mostly likely originating from removed LLE infected hard ware.  ID recommended ertapenem 1g daily to complete 6 weeks from washout if surgical cx showed no growth. However, the cx grew rare Staph aureus. A picc line was placed by IR for long-term abx usage. Antibiotics were switched to cefazolin 2g q 8 hours (allergies reviewed - cefazolin does not share side chain similarities with penicillin)  Plan for 6 week course from debridement.     The pt's DILIP resolved w/ straight catheterization.     57-year-old female past medical history of asthma, hypertension, osteomyelitis of left ankle (not on Ab), prediabetes presented for 7-10 day history of difficulty urinating. She reported having pain initiating the stream, and only having a few drops of urine come out at at time. No pain while urinating. No hematuria. This had been associated with 1 week of nausea/ vomiting clear liquid and inability to eat. She reported losing 5-10 pounds over the past week. No abdominal pain. No reported fever / chills. No diarrhea. ROS also positive for productive cough (whitish sputum) over the past week + sore throat. No chest pain / SOB. No sick contacts. No recent hospitalization or antibiotic use. She has 2 previous episodes of UTI (one of which she got admitted to [facility] for). She also has osteomyelitis of her R ankle, not on treatment, was planned for surgery + biopsy before she got sick.      In ED, vitals showed: BP: 100/65, HR:127, RR: 18, temp: 97.3, spO2: 98% --> HR improved with fluids  Labs significant for WBC: 21K, creatinine: 3.9, Na: 130, K:5.9, bicarb: 13, A, lactate 1.6   UA: positive for blood and LE  - CT:Diffuse urinary bladder wall thickening, mucosal hyper enhancement, compatible with urinary bladder infection, cystitis. Increased right renal pelvis and ureter urothelial enhancement, compatible with ascending urinary tract infection.  - CT Neck: Dental caries and odontogenic disease. Impacted right molar tooth associated with periapical lucency and buccal dehiscence. Small 3 x 3 mm  abscess is visualized adjacent to the right mandible. Left middle cerebral artery, M2 segment 7 mm aneurysm. Scattered ground glass opacities and tree-in-bud nodules. Etiology   favors infection/inflammation.    s/p 1L LR _ ceftriaxone, admitted to medicine  The pt was started on cefepime 1g q 12 hours and flagyl 500 mg q 8 hours. ID was consulted and recommended removal of hardware from left ankle over concern for OM. Xray of left foot showed   status post screw and plate fixation of the distal fibula.  There is a subacute or chronic minimally displaced incompletely healed  fracture of the fibula at the proximal margin of the hardware,  demonstrating incomplete bony bridging/callus formation. The hardware is   intact. There is no radiographic evidence of osteomyelitis. No acute fracture is  seen. There is no dislocation. There is no definite ankle mortise  asymmetry. Osteopenia is noted. There is evidence of skin ulceration over the  lateral malleolus/fibular plate.     The hardware was removed by orthopedics on . Operative findings included a stable ankle, fibrous union, purulence about fibrous nonunion; syndesmosis stable. The pt is to remain NWB x 2 weeks. Then if wounds stable, WBAT in cam BOOT.    Patient was seen by dentistry and found to require extraction of remaining dentition. Intraoral exam does not indicate that her intraoral condition is related to the sepsis, and does not indicate immediate dental attention. Patient needs comprehensive dental care, but no emergent work is necessary. Patient to followup with outside dentist for full extractions and denture fabrication.   The pt should f/u w/ outpatient dentist for comprehensive dental care.   If any difficulty swallowing/breathing, fever occur, return to ER.     She was found to have DILIP secondary to urinary retention, requiring repeat straight catheterizations. However, urine cx was negative and the infection was mostly likely originating from removed LLE infected hard ware.  ID recommended ertapenem 1g daily to complete 6 weeks from washout if surgical cx showed no growth. However, the cx grew rare Staph aureus. A picc line was placed by IR for long-term abx usage. Antibiotics were switched to cefazolin 2g q 8 hours (allergies reviewed - cefazolin does not share side chain similarities with penicillin)  Plan for 6 week course from debridement (until Oct. 25th).     The pt's DILIP resolved w/ straight catheterization.     57-year-old female past medical history of asthma, hypertension, osteomyelitis of left ankle (not on Ab), prediabetes presented for 7-10 day history of difficulty urinating. She reported having pain initiating the stream, and only having a few drops of urine come out at at time. No pain while urinating. No hematuria. This had been associated with 1 week of nausea/ vomiting clear liquid and inability to eat. She reported losing 5-10 pounds over the past week. No abdominal pain. No reported fever / chills. No diarrhea. ROS also positive for productive cough (whitish sputum) over the past week + sore throat. No chest pain / SOB. No sick contacts. No recent hospitalization or antibiotic use. She has 2 previous episodes of UTI (one of which she got admitted to [facility] for). She also has osteomyelitis of her R ankle, not on treatment, was planned for surgery + biopsy before she got sick.      In ED, vitals showed: BP: 100/65, HR:127, RR: 18, temp: 97.3, spO2: 98% --> HR improved with fluids  Labs significant for WBC: 21K, creatinine: 3.9, Na: 130, K:5.9, bicarb: 13, A, lactate 1.6   UA: positive for blood and LE  - CT:Diffuse urinary bladder wall thickening, mucosal hyper enhancement, compatible with urinary bladder infection, cystitis. Increased right renal pelvis and ureter urothelial enhancement, compatible with ascending urinary tract infection.  - CT Neck: Dental caries and odontogenic disease. Impacted right molar tooth associated with periapical lucency and buccal dehiscence. Small 3 x 3 mm  abscess is visualized adjacent to the right mandible. Left middle cerebral artery, M2 segment 7 mm aneurysm. Scattered ground glass opacities and tree-in-bud nodules. Etiology   favors infection/inflammation.    s/p 1L LR _ ceftriaxone, admitted to medicine  The pt was started on cefepime 1g q 12 hours and flagyl 500 mg q 8 hours. ID was consulted and recommended removal of hardware from left ankle over concern for OM. Xray of left foot showed   status post screw and plate fixation of the distal fibula.  There is a subacute or chronic minimally displaced incompletely healed  fracture of the fibula at the proximal margin of the hardware,  demonstrating incomplete bony bridging/callus formation. The hardware is   intact. There is no radiographic evidence of osteomyelitis. No acute fracture is  seen. There is no dislocation. There is no definite ankle mortise  asymmetry. Osteopenia is noted. There is evidence of skin ulceration over the  lateral malleolus/fibular plate.     The hardware was removed by orthopedics on . Operative findings included a stable ankle, fibrous union, purulence about fibrous nonunion; syndesmosis stable. The pt is to remain NWB x 2 weeks. Then if wounds stable, WBAT in cam BOOT.    Patient was seen by dentistry and found to require extraction of remaining dentition. Intraoral exam does not indicate that her intraoral condition is related to the sepsis, and does not indicate immediate dental attention. Patient needs comprehensive dental care, but no emergent work is necessary. Patient to followup with outside dentist for full extractions and denture fabrication.   The pt should f/u w/ outpatient dentist for comprehensive dental care.   If any difficulty swallowing/breathing, fever occur, return to ER.     She was found to have DILIP secondary to urinary retention, requiring repeat straight catheterizations. However, urine cx was negative and the infection was mostly likely originating from removed LLE infected hard ware.  ID recommended ertapenem 1g daily to complete 6 weeks from washout if surgical cx showed no growth. However, the cx grew rare Staph aureus. A picc line was placed by IR for long-term abx usage. Antibiotics were switched to cefazolin 2g q 8 hours (allergies reviewed - cefazolin does not share side chain similarities with penicillin)  Plan for 6 week course from debridement (until Oct. 24th).     The pt's DILIP resolved w/ straight catheterization.

## 2023-09-19 NOTE — DISCHARGE NOTE PROVIDER - NSDCMRMEDTOKEN_GEN_ALL_CORE_FT
amLODIPine 2.5 mg oral tablet: 1 orally once a day   amLODIPine 2.5 mg oral tablet: 1 orally once a day  ceFAZolin 2 g/100 mL-NaCl 0.9% intravenous solution: 2 gram(s) intravenous every 8 hours  ceFAZolin 2 g/100 mL-NaCl 0.9% intravenous solution: 2 gram(s) intravenous every 8 hours   amLODIPine 2.5 mg oral tablet: 1 orally once a day  ceFAZolin 2 g/100 mL-NaCl 0.9% intravenous solution: 2 gram(s) intravenous every 8 hours

## 2023-09-19 NOTE — PROGRESS NOTE ADULT - REASON FOR ADMISSION
Problem: Fall Risk (Adult)  Goal: Identify Related Risk Factors and Signs and Symptoms  Outcome: Ongoing (interventions implemented as appropriate)    Goal: Absence of Fall  Outcome: Ongoing (interventions implemented as appropriate)      Problem: Skin Injury Risk (Adult)  Goal: Identify Related Risk Factors and Signs and Symptoms  Outcome: Ongoing (interventions implemented as appropriate)      Problem: Patient Care Overview  Goal: Plan of Care Review  Outcome: Ongoing (interventions implemented as appropriate)   08/11/18 3392   Coping/Psychosocial   Plan of Care Reviewed With patient;spouse   Plan of Care Review   Progress improving   OTHER   Outcome Summary Pt A&Ox4, pt can be forgetful. Pt denies any pain this shift. Pt upgraded to soft texture, nectar thick diet. Incontinent at times. VSS, pt runs sinus kaela. SCDS for VTE. Stroke booklet given and educated. Continue to monitor. Safety maintained.        Problem: Stroke (Ischemic) (Adult)  Goal: Signs and Symptoms of Listed Potential Problems Will be Absent, Minimized or Managed (Stroke)  Outcome: Ongoing (interventions implemented as appropriate)        
urinary symptoms

## 2023-09-19 NOTE — DISCHARGE NOTE PROVIDER - CARE PROVIDER_API CALL
Beronica Tripathi  Endocrinology/Metab/Diabetes  [address] [place] [address], [address]  [place] [address]  Phone: [phone]  Fax: [phone]  Established Patient  Follow Up Time: 2 weeks   Beronica Tripathi  Endocrinology/Metab/Diabetes  [address] [place] [address], [address]  [place] [address]  Phone: [phone]  Fax: [phone]  Established Patient  Follow Up Time: 2 weeks    Amarjit Hoffman follow-up with Dr. Amarjit Hoffman [facility] for Telehealth. We will call the patient between 10:30-1:30      [address] [facility]       [phone]       Fax [phone]  Phone: [phone]  Fax: (   )    -  Follow Up Time:     Leeroy Fernandez  Orthopaedic Surgery  [address] [place] [address]  Phone: [phone]  Fax: [phone]  Follow Up Time: 1 week

## 2023-09-19 NOTE — PROGRESS NOTE ADULT - PROVIDER SPECIALTY LIST ADULT
Hospitalist
Infectious Disease
Internal Medicine
Orthopedics
Internal Medicine
Orthopedics
Hospitalist
Hospitalist
Infectious Disease
Internal Medicine
Internal Medicine
Orthopedics
Orthopedics
Hospitalist
Infectious Disease
Internal Medicine
Internal Medicine

## 2023-09-19 NOTE — DISCHARGE NOTE PROVIDER - PROVIDER TOKENS
PROVIDER:[TOKEN:[[id number]],FOLLOWUP:[2 weeks],ESTABLISHEDPATIENT:[T]] PROVIDER:[TOKEN:[[id number]],FOLLOWUP:[2 weeks],ESTABLISHEDPATIENT:[T]],FREE:[LAST:[Dalton],FIRST:[Amarjit],PHONE:[phone]],FAX:[(   )    -],ADDRESS:[ID follow-up with Dr. Amarjit Hoffman [facility] for Telehealth. We will call the patient between 10:30-1:30      [address] [facility]       [phone]       Fax [phone]]],PROVIDER:[TOKEN:[[id number]],FOLLOWUP:[1 week]]

## 2023-09-19 NOTE — PROGRESS NOTE ADULT - SUBJECTIVE AND OBJECTIVE BOX
24H events:    Patient is a 57y old Female who presents with a chief complaint of urinary symptoms (11 Sep 2023 12:44)    Primary diagnosis of Urinary retention        PAST MEDICAL & SURGICAL HISTORY    SOCIAL HISTORY:  Social History:      ALLERGIES:  penicillin (Anaphylaxis)      VITALS:   T(F): 97.4  HR: 63  BP: 89/54  RR: 18  SpO2: 98%    LABS:                            HOME MEDICATIONS:  amLODIPine 2.5 mg oral tablet: 1 orally once a day      MEDICATIONS:  STANDING MEDICATIONS  cefepime   IVPB      cefepime   IVPB 1000 milliGRAM(s) IV Intermittent every 12 hours  heparin   Injectable 5000 Unit(s) SubCutaneous every 12 hours  influenza   Vaccine 0.5 milliLiter(s) IntraMuscular once  lactated ringers. 1000 milliLiter(s) IV Continuous <Continuous>  metroNIDAZOLE  IVPB 500 milliGRAM(s) IV Intermittent every 8 hours  metroNIDAZOLE  IVPB      pantoprazole    Tablet 40 milliGRAM(s) Oral before breakfast  polyethylene glycol 3350 17 Gram(s) Oral daily    PRN MEDICATIONS  acetaminophen     Tablet .. 650 milliGRAM(s) Oral every 6 hours PRN  albuterol    90 MICROgram(s) HFA Inhaler 2 Puff(s) Inhalation every 6 hours PRN  benzocaine 20% Spray 1 Spray(s) Topical three times a day PRN  ondansetron Injectable 4 milliGRAM(s) IV Push every 6 hours PRN        
24H events:    Patient is a 57y old Female who presents with a chief complaint of urinary symptoms (12 Sep 2023 12:47)    Primary diagnosis of Urinary retention        PAST MEDICAL & SURGICAL HISTORY    SOCIAL HISTORY:  Social History:      ALLERGIES:  penicillin (Anaphylaxis)      VITALS:   T(F): 98.6  HR: 62  BP: 111/60  RR: 18  SpO2: 96%    LABS:                        9.1    8.85  )-----------( 291      ( 12 Sep 2023 07:35 )             26.5     09-12    137  |  102  |  5<L>  ----------------------------<  89  3.0<L>   |  24  |  0.6<L>    Ca    7.6<L>      12 Sep 2023 11:10  Mg     1.5     09-12    TPro  5.1<L>  /  Alb  2.5<L>  /  TBili  <0.2  /  DBili  x   /  AST  11  /  ALT  6   /  AlkPhos  64  09-12    PT/INR - ( 12 Sep 2023 07:35 )   PT: 10.80 sec;   INR: 0.95 ratio         PTT - ( 12 Sep 2023 07:35 )  PTT:32.8 sec  Urinalysis Basic - ( 12 Sep 2023 11:10 )    Color: x / Appearance: x / SG: x / pH: x  Gluc: 89 mg/dL / Ketone: x  / Bili: x / Urobili: x   Blood: x / Protein: x / Nitrite: x   Leuk Esterase: x / RBC: x / WBC x   Sq Epi: x / Non Sq Epi: x / Bacteria: x                    HOME MEDICATIONS:  amLODIPine 2.5 mg oral tablet: 1 orally once a day      MEDICATIONS:  STANDING MEDICATIONS  cefepime   IVPB      cefepime   IVPB 1000 milliGRAM(s) IV Intermittent every 12 hours  influenza   Vaccine 0.5 milliLiter(s) IntraMuscular once  lactated ringers. 1000 milliLiter(s) IV Continuous <Continuous>  magnesium sulfate  IVPB 2 Gram(s) IV Intermittent once  metroNIDAZOLE  IVPB 500 milliGRAM(s) IV Intermittent every 8 hours  metroNIDAZOLE  IVPB      pantoprazole    Tablet 40 milliGRAM(s) Oral before breakfast  polyethylene glycol 3350 17 Gram(s) Oral daily  potassium chloride  20 mEq/100 mL IVPB 20 milliEquivalent(s) IV Intermittent every 2 hours    PRN MEDICATIONS  acetaminophen     Tablet .. 650 milliGRAM(s) Oral every 6 hours PRN  albuterol    90 MICROgram(s) HFA Inhaler 2 Puff(s) Inhalation every 6 hours PRN  benzocaine 20% Spray 1 Spray(s) Topical three times a day PRN  ondansetron Injectable 4 milliGRAM(s) IV Push every 6 hours PRN        
57-year-old female past medical history of asthma, hypertension, osteomyelitis of left ankle (not on Ab), prediabetes presented for 7-10 day history of difficulty urinating, found to have DILIP, urinary retention, but infection was mostly likely originating from LLE infected hard ware. She was consulted by ortho, hard wear was removed by  on 9/13.   In last 24 hours pt failed TOV, this morning she looks comfortable, denies pain at rest, has no specific complaints, refusing discharge to SNF, after a long conversation with ortho PA in the room pt agreed to consider 4A rehab.       Vital Signs Last 24 Hrs  T(C): 36 (15 Sep 2023 04:43), Max: 36.3 (14 Sep 2023 19:33)  T(F): 96.8 (15 Sep 2023 04:43), Max: 97.3 (14 Sep 2023 19:33)  HR: 65 (15 Sep 2023 04:43) (65 - 74)  BP: 95/64 (15 Sep 2023 04:43) (79/50 - 102/58)  BP(mean): --  RR: 18 (15 Sep 2023 04:43) (18 - 18)  SpO2: 96% (15 Sep 2023 04:43) (96% - 96%)        PHYSICAL EXAM:  GENERAL: NAD, skinny lady, pleasant   HEAD:  Atraumatic, Normocephalic  NECK: Supple, No JVD, Normal thyroid  NERVOUS SYSTEM:  Alert & Oriented X3, Good concentration; Motor Strength 5/5 B/L upper and lower extremities; DTRs 2+ intact and symmetric  CHEST/LUNG: CTA b/l   HEART: S1, S2, RRR   ABDOMEN: Soft, Nontender, Nondistended; Bowel sounds present  EXTREMITIES:  2+ Peripheral Pulses, No clubbing, cyanosis, or edema, dressing noted on left ankle       LABS:                                   9.0    10.66 )-----------( 258      ( 15 Sep 2023 07:44 )             26.9   09-15    139  |  108  |  5<L>  ----------------------------<  82  3.5   |  20  |  0.6<L>    Ca    7.8<L>      15 Sep 2023 07:44  Mg     1.7     09-15    TPro  4.8<L>  /  Alb  2.5<L>  /  TBili  <0.2  /  DBili  x   /  AST  11  /  ALT  6   /  AlkPhos  63  09-15    Urinalysis Basic - ( 13 Sep 2023 11:47 )    Color: x / Appearance: x / SG: x / pH: x  Gluc: 87 mg/dL / Ketone: x  / Bili: x / Urobili: x   Blood: x / Protein: x / Nitrite: x   Leuk Esterase: x / RBC: x / WBC x   Sq Epi: x / Non Sq Epi: x / Bacteria: x    Culture - Blood in AM (09.08.23 @ 06:33)   Specimen Source: .Blood None  Culture Results:   No growth at 5 daysCulture - Urine (09.07.23 @ 16:00)   Specimen Source: Clean Catch Clean Catch (Midstream)  Culture Results:   No growth  Culture - Tissue with Gram Stain (09.13.23 @ 19:00)   Gram Stain:   No polymorphonuclear cells seen per low power field   No organisms seen per oil power field  Specimen Source: .Tissue left ankleCulture - Tissue with Gram Stain (09.13.23 @ 18:58)   Gram Stain:   No polymorphonuclear cells seen per low power field   No organisms seen per oil power field  Specimen Source: .Tissue left ankle    RADIOLOGY & ADDITIONAL TESTS:  < from: Xray Foot AP + Lateral + Oblique, Left (09.08.23 @ 13:54) >  FINDINGS/  IMPRESSION:    The patient is status post screw and plate fixation of the distal fibula.   There is a subacute or chronic minimally displaced incompletely healed   fracture of the fibula at the proximal margin of the hardware,   demonstrating incomplete bony bridging/callus formation. The hardware is   intact.    There is no radiographic evidence of osteomyelitis. No acute fracture is   seen. There is no dislocation.There is no definite ankle mortise   asymmetry.    Osteopenia is noted. There is evidence of skin ulceration over the   lateral malleolus/fibular plate.    < from: CT Abdomen and Pelvis w/ IV Cont (09.07.23 @ 16:55) >  IMPRESSION:    Diffuse urinary bladder wall thickening, mucosal hyperenhancement,   compatible with urinary bladder infection, cystitis.  Increased right renal pelvis and ureter urothelial enhancement,   compatible with ascending urinary tract infection.    < from: CT Neck Soft Tissue w/ IV Cont (09.07.23 @ 16:54) >  IMPRESSION:    1.  Multiple dental caries and periapical lucencies. Recommend follow-up   with dental exam.    2.  Apparent 3 mm rim-enhancing fluid collection overlying the right   mandibular alveolar ridge, potentially a tiny abscess (ser 4 im 87).    3.  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    4.  Scattered groundglass opacities and tree-in-bud nodules. Etiology   favors infection/inflammation.    MEDICATIONS  (STANDING):  acetaminophen     Tablet .. 975 milliGRAM(s) Oral every 8 hours  cefepime   IVPB 1000 milliGRAM(s) IV Intermittent every 12 hours  cefepime   IVPB      heparin   Injectable 5000 Unit(s) SubCutaneous every 12 hours  influenza   Vaccine 0.5 milliLiter(s) IntraMuscular once  magnesium sulfate  IVPB 1 Gram(s) IV Intermittent once  metroNIDAZOLE  IVPB 500 milliGRAM(s) IV Intermittent every 8 hours  metroNIDAZOLE  IVPB      pantoprazole    Tablet 40 milliGRAM(s) Oral before breakfast  polyethylene glycol 3350 17 Gram(s) Oral daily  sodium chloride 0.9%. 1000 milliLiter(s) (75 mL/Hr) IV Continuous <Continuous>    MEDICATIONS  (PRN):  albuterol    90 MICROgram(s) HFA Inhaler 2 Puff(s) Inhalation every 6 hours PRN Shortness of Breath and/or Wheezing  benzocaine 20% Spray 1 Spray(s) Topical three times a day PRN throat pain  HYDROmorphone  Injectable 1 milliGRAM(s) IV Push every 4 hours PRN Severe Pain (7 - 10)  melatonin 3 milliGRAM(s) Oral at bedtime PRN Sleep  ondansetron Injectable 4 milliGRAM(s) IV Push every 6 hours PRN Nausea and/or Vomiting        
57-year-old female past medical history of asthma, hypertension, osteomyelitis of left ankle (not on Ab), prediabetes presented for 7-10 day history of difficulty urinating, found to have DILIP, urinary retention, but infection was mostly likely originating from LLE infected hard ware. She was consulted by ortho, hard wear was removed by  on 9/13.   While in the hospital pt failed TOV twice, getting straight cath now.   Today pt c/o left foot pain at times, has a discomfort around the PICC line.       Vital Signs Last 24 Hrs  T(C): 36.4 (19 Sep 2023 04:43), Max: 37.2 (18 Sep 2023 13:20)  T(F): 97.6 (19 Sep 2023 04:43), Max: 98.9 (18 Sep 2023 13:20)  HR: 70 (19 Sep 2023 04:43) (63 - 78)  BP: 110/64 (19 Sep 2023 04:43) (110/64 - 147/67)  BP(mean): --  RR: 18 (19 Sep 2023 04:43) (18 - 18)      PHYSICAL EXAM:  GENERAL: NAD, skinny lady, pleasant   HEAD:  Atraumatic, Normocephalic  NECK: Supple, No JVD, Normal thyroid  NERVOUS SYSTEM:  Alert & Oriented X3, Good concentration; Motor Strength 5/5 B/L upper and lower extremities; DTRs 2+ intact and symmetric  CHEST/LUNG: CTA b/l   HEART: S1, S2, RRR   ABDOMEN: Soft, Nontender, Nondistended; Bowel sounds present  EXTREMITIES:  2+ Peripheral Pulses, No clubbing, cyanosis, or edema, dressing noted on left ankle       LABS:                        8.5    7.09  )-----------( 362      ( 18 Sep 2023 06:13 )             26.4   09-18    142  |  109  |  6<L>  ----------------------------<  84  3.6   |  23  |  0.6<L>    Ca    8.1<L>      18 Sep 2023 06:13  Mg     1.6     09-18    TPro  4.8<L>  /  Alb  2.6<L>  /  TBili  <0.2  /  DBili  x   /  AST  10  /  ALT  <5  /  AlkPhos  63  09-18      Urinalysis Basic - ( 13 Sep 2023 11:47 )    Color: x / Appearance: x / SG: x / pH: x  Gluc: 87 mg/dL / Ketone: x  / Bili: x / Urobili: x   Blood: x / Protein: x / Nitrite: x   Leuk Esterase: x / RBC: x / WBC x   Sq Epi: x / Non Sq Epi: x / Bacteria: x    Culture - Blood in AM (09.08.23 @ 06:33)   Specimen Source: .Blood None  Culture Results:   No growth at 5 daysCulture - Urine (09.07.23 @ 16:00)   Specimen Source: Clean Catch Clean Catch (Midstream)  Culture Results:   No growth  Culture - Tissue with Gram Stain (09.13.23 @ 19:00)   Gram Stain:   No polymorphonuclear cells seen per low power field   No organisms seen per oil power field  Specimen Source: .Tissue left ankleCulture - Tissue with Gram Stain (09.13.23 @ 18:58)   Gram Stain:   No polymorphonuclear cells seen per low power field   No organisms seen per oil power field  Specimen Source: .Tissue left ankle  Culture - Other (09.13.23 @ 19:01)   - Ampicillin/Sulbactam: S <=8/4  - Cefazolin: S <=4  - Clindamycin: S <=0.25  - Erythromycin: R >4  - Gentamicin: S <=1 Should not be used as monotherapy  - Oxacillin: S <=0.25 Oxacillin predicts susceptibility for dicloxacillin, methicillin, and nafcillin  - Penicillin: R >8  - Rifampin: S <=1 Should not be used as monotherapy  - Tetracycline: S <=1  - Trimethoprim/Sulfamethoxazole: S <=0.5/9.5  - Vancomycin: S 2  Specimen Source: .Other None  Culture Results:   Rare Staphylococcus aureus  Organism Identification: Staphylococcus aureus    RADIOLOGY & ADDITIONAL TESTS:  < from: Xray Foot AP + Lateral + Oblique, Left (09.08.23 @ 13:54) >  FINDINGS/  IMPRESSION:    The patient is status post screw and plate fixation of the distal fibula.   There is a subacute or chronic minimally displaced incompletely healed   fracture of the fibula at the proximal margin of the hardware,   demonstrating incomplete bony bridging/callus formation. The hardware is   intact.    There is no radiographic evidence of osteomyelitis. No acute fracture is   seen. There is no dislocation.There is no definite ankle mortise   asymmetry.    Osteopenia is noted. There is evidence of skin ulceration over the   lateral malleolus/fibular plate.    < from: CT Abdomen and Pelvis w/ IV Cont (09.07.23 @ 16:55) >  IMPRESSION:    Diffuse urinary bladder wall thickening, mucosal hyperenhancement,   compatible with urinary bladder infection, cystitis.  Increased right renal pelvis and ureter urothelial enhancement,   compatible with ascending urinary tract infection.    < from: CT Neck Soft Tissue w/ IV Cont (09.07.23 @ 16:54) >  IMPRESSION:    1.  Multiple dental caries and periapical lucencies. Recommend follow-up   with dental exam.    2.  Apparent 3 mm rim-enhancing fluid collection overlying the right   mandibular alveolar ridge, potentially a tiny abscess (ser 4 im 87).    3.  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    4.  Scattered groundglass opacities and tree-in-bud nodules. Etiology   favors infection/inflammation.    MEDICATIONS  (STANDING):  acetaminophen     Tablet .. 975 milliGRAM(s) Oral every 8 hours  ceFAZolin   IVPB 2000 milliGRAM(s) IV Intermittent every 8 hours  chlorhexidine 2% Cloths 1 Application(s) Topical <User Schedule>  heparin   Injectable 5000 Unit(s) SubCutaneous every 12 hours  influenza   Vaccine 0.5 milliLiter(s) IntraMuscular once  pantoprazole    Tablet 40 milliGRAM(s) Oral before breakfast  polyethylene glycol 3350 17 Gram(s) Oral daily  sodium chloride 0.9%. 1000 milliLiter(s) (75 mL/Hr) IV Continuous <Continuous>    MEDICATIONS  (PRN):  albuterol    90 MICROgram(s) HFA Inhaler 2 Puff(s) Inhalation every 6 hours PRN Shortness of Breath and/or Wheezing  benzocaine 20% Spray 1 Spray(s) Topical three times a day PRN throat pain  HYDROmorphone  Injectable 1 milliGRAM(s) IV Push every 4 hours PRN Severe Pain (7 - 10)  melatonin 3 milliGRAM(s) Oral at bedtime PRN Sleep  ondansetron Injectable 4 milliGRAM(s) IV Push every 6 hours PRN Nausea and/or Vomiting  oxycodone    5 mG/acetaminophen 325 mG 2 Tablet(s) Oral every 4 hours PRN Moderate Pain (4 - 6)                
LUZ MARINA WISE  57y, Female  Allergy: penicillin (Anaphylaxis)      LOS  12d    CHIEF COMPLAINT: urinary symptoms (19 Sep 2023 12:55)      INTERVAL EVENTS/HPI  - No acute events overnight  - T(F): , Max: 98.5 (09-19-23 @ 12:47)  - Denies any worsening symptoms  - Tolerating medication  - WBC Count: 10.00 (09-19-23 @ 12:29)  WBC Count: 7.09 (09-18-23 @ 06:13)     - Creatinine: 0.6 (09-18-23 @ 06:13)       ROS  General: Denies rigors, nightsweats  HEENT: Denies headache, rhinorrhea, sore throat, eye pain  CV: Denies CP, palpitations  PULM: Denies wheezing, hemoptysis  GI: Denies hematemesis, hematochezia, melena  : Denies discharge, hematuria  MSK: Denies arthralgias, myalgias  SKIN: Denies rash, lesions  NEURO: Denies paresthesias, weakness  PSYCH: Denies depression, anxiety    VITALS:  T(F): 98.5, Max: 98.5 (09-19-23 @ 12:47)  HR: 74  BP: 124/70  RR: 18Vital Signs Last 24 Hrs  T(C): 36.9 (19 Sep 2023 12:47), Max: 36.9 (18 Sep 2023 20:59)  T(F): 98.5 (19 Sep 2023 12:47), Max: 98.5 (19 Sep 2023 12:47)  HR: 74 (19 Sep 2023 12:47) (70 - 78)  BP: 124/70 (19 Sep 2023 12:47) (110/64 - 124/71)  BP(mean): --  RR: 18 (19 Sep 2023 12:47) (18 - 18)  SpO2: --        PHYSICAL EXAM:  Gen: NAD, resting in bed  HEENT: Normocephalic, atraumatic  Neck: supple, no lymphadenopathy  CV: Regular rate & regular rhythm  Lungs: decreased BS at bases, no fremitus  Abdomen: Soft, BS present  Ext: RLE dressed  Neuro: non focal, awake  Skin: no rash, no erythema  Lines: no phlebitis    FH: Non-contributory  Social Hx: Non-contributory    TESTS & MEASUREMENTS:                        8.9    10.00 )-----------( 424      ( 19 Sep 2023 12:29 )             26.9     09-18    142  |  109  |  6<L>  ----------------------------<  84  3.6   |  23  |  0.6<L>    Ca    8.1<L>      18 Sep 2023 06:13  Mg     1.6     09-18    TPro  4.8<L>  /  Alb  2.6<L>  /  TBili  <0.2  /  DBili  x   /  AST  10  /  ALT  <5  /  AlkPhos  63  09-18      LIVER FUNCTIONS - ( 18 Sep 2023 06:13 )  Alb: 2.6 g/dL / Pro: 4.8 g/dL / ALK PHOS: 63 U/L / ALT: <5 U/L / AST: 10 U/L / GGT: x           Urinalysis Basic - ( 18 Sep 2023 06:13 )    Color: x / Appearance: x / SG: x / pH: x  Gluc: 84 mg/dL / Ketone: x  / Bili: x / Urobili: x   Blood: x / Protein: x / Nitrite: x   Leuk Esterase: x / RBC: x / WBC x   Sq Epi: x / Non Sq Epi: x / Bacteria: x        Culture - Surgical Swab (collected 09-13-23 @ 19:02)  Source: .Surgical Swab LEFT ANKLE  Preliminary Report (09-15-23 @ 06:34):    No growth    Culture - Acid Fast - Other w/Smear (collected 09-13-23 @ 19:01)  Source: .Other None  Preliminary Report (09-16-23 @ 15:07):    Culture is being performed.    Culture - Acid Fast - Other w/Smear (collected 09-13-23 @ 19:01)  Source: .Other None  Preliminary Report (09-16-23 @ 15:07):    Culture is being performed.    Culture - Fungal, Other (collected 09-13-23 @ 19:01)  Source: .Other None  Preliminary Report (09-16-23 @ 15:03):    Culture is being performed. Fungal cultures are held for 4 weeks.    Culture - Fungal, Other (collected 09-13-23 @ 19:01)  Source: .Other None  Preliminary Report (09-16-23 @ 15:03):    Culture is being performed. Fungal cultures are held for 4 weeks.    Culture - Other (collected 09-13-23 @ 19:01)  Source: .Other None  Final Report (09-16-23 @ 17:18):    Rare Staphylococcus aureus  Organism: Staphylococcus aureus (09-16-23 @ 17:18)  Organism: Staphylococcus aureus (09-16-23 @ 17:18)      Method Type: KATARZYNA      -  Ampicillin/Sulbactam: S <=8/4      -  Cefazolin: S <=4      -  Clindamycin: S <=0.25      -  Erythromycin: R >4      -  Gentamicin: S <=1 Should not be used as monotherapy      -  Oxacillin: S <=0.25 Oxacillin predicts susceptibility for dicloxacillin, methicillin, and nafcillin      -  Penicillin: R >8      -  Rifampin: S <=1 Should not be used as monotherapy      -  Tetracycline: S <=1      -  Trimethoprim/Sulfamethoxazole: S <=0.5/9.5      -  Vancomycin: S 2    Culture - Fungal, Tissue (collected 09-13-23 @ 19:00)  Source: .Tissue left ankle  Preliminary Report (09-16-23 @ 15:03):    Culture is being performed. Fungal cultures are held for 4 weeks.    Culture - Acid Fast - Tissue w/Smear (collected 09-13-23 @ 19:00)  Source: .Tissue left ankle  Preliminary Report (09-16-23 @ 15:07):    Culture is being performed.    Culture - Tissue with Gram Stain (collected 09-13-23 @ 19:00)  Source: .Tissue left ankle  Gram Stain (09-14-23 @ 04:18):    No polymorphonuclear cells seen per low power field    No organisms seen per oil power field  Final Report (09-19-23 @ 09:25):    No growth    Culture - Fungal, Tissue (collected 09-13-23 @ 19:00)  Source: .Tissue left ankle  Preliminary Report (09-16-23 @ 15:03):    Culture is being performed. Fungal cultures are held for 4 weeks.    Culture - Acid Fast - Tissue w/Smear (collected 09-13-23 @ 19:00)  Source: .Tissue left ankle  Preliminary Report (09-16-23 @ 15:07):    Culture is being performed.    Culture - Tissue with Gram Stain (collected 09-13-23 @ 18:58)  Source: .Tissue left ankle  Gram Stain (09-14-23 @ 04:18):    No polymorphonuclear cells seen per low power field    No organisms seen per oil power field  Final Report (09-19-23 @ 09:11):    Growth in fluid media only Staphylococcus aureus  Organism: Staphylococcus aureus (09-19-23 @ 09:11)  Organism: Staphylococcus aureus (09-19-23 @ 09:11)      Method Type: KATARZYNA      -  Ampicillin/Sulbactam: S <=8/4      -  Cefazolin: S <=4      -  Clindamycin: S <=0.25      -  Erythromycin: R >4      -  Gentamicin: S <=1 Should not be used as monotherapy      -  Oxacillin: S <=0.25 Oxacillin predicts susceptibility for dicloxacillin, methicillin, and nafcillin      -  Penicillin: R 8      -  Rifampin: S <=1 Should not be used as monotherapy      -  Tetracycline: S <=1      -  Trimethoprim/Sulfamethoxazole: S <=0.5/9.5      -  Vancomycin: S 2    Culture - Blood (collected 09-08-23 @ 06:33)  Source: .Blood None  Final Report (09-13-23 @ 15:08):    No growth at 5 days    Culture - Blood (collected 09-08-23 @ 01:36)  Source: .Blood None  Final Report (09-13-23 @ 07:01):    No growth at 5 days    Culture - Urine (collected 09-07-23 @ 16:00)  Source: Clean Catch Clean Catch (Midstream)  Final Report (09-08-23 @ 22:28):    No growth            INFECTIOUS DISEASES TESTING  COVID-19 PCR: NotDetec (09-18-23 @ 23:20)  MRSA PCR Result.: Negative (09-13-23 @ 09:01)  Procalcitonin, Serum: 0.22 (09-08-23 @ 06:33)      INFLAMMATORY MARKERS      RADIOLOGY & ADDITIONAL TESTS:  I have personally reviewed the last available Chest xray  CXR      CT      CARDIOLOGY TESTING  12 Lead ECG:   Ventricular Rate 51 BPM    Atrial Rate 51 BPM    P-R Interval 138 ms    QRS Duration 84 ms    Q-T Interval 458 ms    QTC Calculation(Bazett) 422 ms    P Axis 77 degrees    R Axis 55 degrees    T Axis 70 degrees    Diagnosis Line Sinus bradycardia with sinus arrhythmia  Otherwise normal ECG    Confirmed by Sudhir Gonzalez ([id number]) on 9/12/2023 8:22:56 PM (09-12-23 @ 09:36)  12 Lead ECG:   Ventricular Rate 104 BPM    Atrial Rate 104 BPM    P-R Interval 122 ms    QRS Duration 72 ms    Q-T Interval 320 ms    QTC Calculation(Bazett) 420 ms    P Axis 78 degrees    R Axis 77 degrees    T Axis 64 degrees    Diagnosis Line Sinus tachycardia  Otherwise normal ECG    Confirmed by Joel Isabel ([id number]) on 9/7/2023 7:30:58 PM (09-07-23 @ 18:53)      MEDICATIONS  acetaminophen     Tablet .. 975 Oral every 8 hours  ceFAZolin   IVPB 2000 IV Intermittent every 8 hours  chlorhexidine 2% Cloths 1 Topical <User Schedule>  heparin   Injectable 5000 SubCutaneous every 12 hours  influenza   Vaccine 0.5 IntraMuscular once  pantoprazole    Tablet 40 Oral before breakfast  polyethylene glycol 3350 17 Oral daily  sodium chloride 0.9%. 1000 IV Continuous <Continuous>      WEIGHT        ANTIBIOTICS:  ceFAZolin   IVPB 2000 milliGRAM(s) IV Intermittent every 8 hours      All available historical records have been reviewed      
Pt. asleep afeb will be managed by Dr. Simmons and seen tomorrow when awake
SUBJECTIVE:    Patient is a 57y old Female who presents with a chief complaint of urinary symptoms (08 Sep 2023 16:43)    Currently admitted to medicine with the primary diagnosis of Urinary retention    Today is hospital day 2d. This morning she is resting comfortably in bed and reports no new issues or overnight events.     PAST MEDICAL & SURGICAL HISTORY      ALLERGIES:  penicillin (Anaphylaxis)    MEDICATIONS:  STANDING MEDICATIONS  cefepime   IVPB      cefepime   IVPB 1000 milliGRAM(s) IV Intermittent every 12 hours  heparin   Injectable 5000 Unit(s) SubCutaneous every 12 hours  influenza   Vaccine 0.5 milliLiter(s) IntraMuscular once  lactated ringers. 500 milliLiter(s) IV Continuous <Continuous>  metroNIDAZOLE  IVPB      metroNIDAZOLE  IVPB 500 milliGRAM(s) IV Intermittent every 8 hours  pantoprazole    Tablet 40 milliGRAM(s) Oral before breakfast  polyethylene glycol 3350 17 Gram(s) Oral daily    PRN MEDICATIONS  acetaminophen     Tablet .. 650 milliGRAM(s) Oral every 6 hours PRN  albuterol    90 MICROgram(s) HFA Inhaler 2 Puff(s) Inhalation every 6 hours PRN  benzocaine 20% Spray 1 Spray(s) Topical three times a day PRN  ondansetron Injectable 4 milliGRAM(s) IV Push every 6 hours PRN    VITALS:   T(F): 96.1  HR: 70  BP: 110/59  RR: 18  SpO2: 98%    LABS:                        9.0    10.66 )-----------( 356      ( 09 Sep 2023 07:41 )             26.6     09-09    135  |  106  |  27<H>  ----------------------------<  79  4.0   |  17  |  1.2    Ca    8.5      09 Sep 2023 07:41  Phos  4.2     09-08  Mg     1.7     09-09    TPro  6.4  /  Alb  3.3<L>  /  TBili  <0.2  /  DBili  x   /  AST  14  /  ALT  9   /  AlkPhos  90  09-08      Urinalysis Basic - ( 09 Sep 2023 07:41 )    Color: x / Appearance: x / SG: x / pH: x  Gluc: 79 mg/dL / Ketone: x  / Bili: x / Urobili: x   Blood: x / Protein: x / Nitrite: x   Leuk Esterase: x / RBC: x / WBC x   Sq Epi: x / Non Sq Epi: x / Bacteria: x            Culture - Blood (collected 08 Sep 2023 01:36)  Source: .Blood None  Preliminary Report (09 Sep 2023 07:02):    No growth at 24 hours    Culture - Urine (collected 07 Sep 2023 16:00)  Source: Clean Catch Clean Catch (Midstream)  Final Report (08 Sep 2023 22:28):    No growth              PHYSICAL EXAM:  GEN: No acute distress  LUNGS: Clear to auscultation bilaterally   HEART: Regular  ABD: Soft, non-tender, non-distended.  EXT: Rt ankle wrapped  NEURO: AAOX3    Intravenous access:   NG tube:   Perez Catheter:   Indwelling Urethral Catheter:     Connect To:  Straight Drainage/Doss    Indication:  Urinary Retention / Obstruction (09-07-23 @ 15:29) (not performed) [Active]      
s/p left ankle irrigation and debridement with zulma pod 1   pt seen at bedside doing well no complaints pain controlled     Vital Signs Last 24 Hrs  T(C): 35.9 (14 Sep 2023 14:29), Max: 36.8 (13 Sep 2023 15:30)  T(F): 96.7 (14 Sep 2023 14:29), Max: 98.2 (13 Sep 2023 15:30)  HR: 74 (14 Sep 2023 14:29) (60 - 116)  BP: 79/50 (14 Sep 2023 14:29) (79/50 - 140/67)  BP(mean): --  RR: 18 (14 Sep 2023 14:29) (16 - 18)  SpO2: 100% (13 Sep 2023 18:25) (95% - 100%)                          8.5    13.59 )-----------( 264      ( 14 Sep 2023 07:37 )             25.7       left ankle:   pt in splint    c/d/i   wiggles toes   
24H events:    Patient is a 57y old Female who presents with a chief complaint of urinary symptoms (12 Sep 2023 16:17)    Primary diagnosis of Urinary retention        PAST MEDICAL & SURGICAL HISTORY    SOCIAL HISTORY:  Social History:      ALLERGIES:  penicillin (Anaphylaxis)      VITALS:   T(F): 97.2  HR: 58  BP: 105/55  RR: 18  SpO2: 97%    LABS:                        9.8    12.93 )-----------( 275      ( 13 Sep 2023 06:55 )             29.3     09-13    140  |  106  |  4<L>  ----------------------------<  87  3.4<L>   |  23  |  0.5<L>    Ca    7.6<L>      13 Sep 2023 11:47  Mg     2.5     09-13    TPro  5.1<L>  /  Alb  2.5<L>  /  TBili  <0.2  /  DBili  x   /  AST  11  /  ALT  6   /  AlkPhos  64  09-12    PT/INR - ( 12 Sep 2023 07:35 )   PT: 10.80 sec;   INR: 0.95 ratio         PTT - ( 12 Sep 2023 07:35 )  PTT:32.8 sec  Urinalysis Basic - ( 13 Sep 2023 11:47 )    Color: x / Appearance: x / SG: x / pH: x  Gluc: 87 mg/dL / Ketone: x  / Bili: x / Urobili: x   Blood: x / Protein: x / Nitrite: x   Leuk Esterase: x / RBC: x / WBC x   Sq Epi: x / Non Sq Epi: x / Bacteria: x                    HOME MEDICATIONS:  amLODIPine 2.5 mg oral tablet: 1 orally once a day      MEDICATIONS:  STANDING MEDICATIONS  cefepime   IVPB      cefepime   IVPB 1000 milliGRAM(s) IV Intermittent every 12 hours  influenza   Vaccine 0.5 milliLiter(s) IntraMuscular once  lactated ringers. 1000 milliLiter(s) IV Continuous <Continuous>  metroNIDAZOLE  IVPB 500 milliGRAM(s) IV Intermittent every 8 hours  metroNIDAZOLE  IVPB      pantoprazole    Tablet 40 milliGRAM(s) Oral before breakfast  polyethylene glycol 3350 17 Gram(s) Oral daily    PRN MEDICATIONS  acetaminophen     Tablet .. 650 milliGRAM(s) Oral every 6 hours PRN  albuterol    90 MICROgram(s) HFA Inhaler 2 Puff(s) Inhalation every 6 hours PRN  benzocaine 20% Spray 1 Spray(s) Topical three times a day PRN  melatonin 3 milliGRAM(s) Oral at bedtime PRN  ondansetron Injectable 4 milliGRAM(s) IV Push every 6 hours PRN        
     Pt seen and examined at bedside. Failed TOV, Straight cath, 900cc taken out, Perez left in.       VITAL SIGNS (Last 24 hrs):  T(C): 36.6 (09-10-23 @ 05:32), Max: 36.6 (09-10-23 @ 05:32)  HR: 78 (09-10-23 @ 05:32) (75 - 78)  BP: 100/66 (09-10-23 @ 05:32) (100/66 - 105/62)  RR: 18 (09-10-23 @ 05:32) (18 - 18)  SpO2: 97% (09-10-23 @ 05:32) (97% - 97%)  Wt(kg): --  Daily     Daily     I&O's Summary      PHYSICAL EXAM:  GENERAL: NAD   HEAD:  Atraumatic, Normocephalic  EYES: EOMI, PERRLA, conjunctiva and sclera clear  NECK: Supple, No JVD  CHEST/LUNG: Clear to auscultation bilaterally; No wheeze  HEART: Regular rate and rhythm; No murmurs, rubs, or gallops  ABDOMEN: Soft, Nontender, Nondistended; Bowel sounds present  EXTREMITIES:  right ankle dressing c/d/i  PSYCH: AAOx3  NEUROLOGY: non-focal  SKIN: No rashes or lesions    Labs Reviewed  Spoke to patient in regards to abnormal labs.    CBC Full  -  ( 09 Sep 2023 07:41 )  WBC Count : 10.66 K/uL  Hemoglobin : 9.0 g/dL  Hematocrit : 26.6 %  Platelet Count - Automated : 356 K/uL  Mean Cell Volume : 94.0 fL  Mean Cell Hemoglobin : 31.8 pg  Mean Cell Hemoglobin Concentration : 33.8 g/dL  Auto Neutrophil # : 8.09 K/uL  Auto Lymphocyte # : 1.59 K/uL  Auto Monocyte # : 0.74 K/uL  Auto Eosinophil # : 0.14 K/uL  Auto Basophil # : 0.05 K/uL  Auto Neutrophil % : 75.9 %  Auto Lymphocyte % : 14.9 %  Auto Monocyte % : 6.9 %  Auto Eosinophil % : 1.3 %  Auto Basophil % : 0.5 %    BMP:    09-09 @ 07:41    Blood Urea Nitrogen - 27  Calcium - 8.5  Carbond Dioxide - 17  Chloride - 106  Creatinine - 1.2  Glucose - 79  Potassium - 4.0  Sodium - 135      Hemoglobin A1c -     Urine Culture:  09-08 @ 06:33 Urine culture: --    Culture Results:   No growth at 24 hours  Method Type: --  Organism: --  Organism Identification: --  Specimen Source: .Blood None  09-08 @ 01:36 Urine culture: --    Culture Results:   No growth at 48 Hours  Method Type: --  Organism: --  Organism Identification: --  Specimen Source: .Blood None  09-07 @ 16:00 Urine culture: --    Culture Results:   No growth  Method Type: --  Organism: --  Organism Identification: --  Specimen Source: Clean Catch Clean Catch (Midstream)            MEDICATIONS  (STANDING):  cefepime   IVPB      cefepime   IVPB 1000 milliGRAM(s) IV Intermittent every 12 hours  heparin   Injectable 5000 Unit(s) SubCutaneous every 12 hours  influenza   Vaccine 0.5 milliLiter(s) IntraMuscular once  metroNIDAZOLE  IVPB 500 milliGRAM(s) IV Intermittent every 8 hours  metroNIDAZOLE  IVPB      pantoprazole    Tablet 40 milliGRAM(s) Oral before breakfast  polyethylene glycol 3350 17 Gram(s) Oral daily    MEDICATIONS  (PRN):  acetaminophen     Tablet .. 650 milliGRAM(s) Oral every 6 hours PRN Temp greater or equal to 38C (100.4F), Mild Pain (1 - 3)  albuterol    90 MICROgram(s) HFA Inhaler 2 Puff(s) Inhalation every 6 hours PRN Shortness of Breath and/or Wheezing  benzocaine 20% Spray 1 Spray(s) Topical three times a day PRN throat pain  ondansetron Injectable 4 milliGRAM(s) IV Push every 6 hours PRN Nausea and/or Vomiting  
24H events:    Patient is a 57y old Female who presents with a chief complaint of urinary symptoms (13 Sep 2023 22:15)    Primary diagnosis of Urinary retention        PAST MEDICAL & SURGICAL HISTORY    SOCIAL HISTORY:  Social History:      ALLERGIES:  penicillin (Anaphylaxis)      VITALS:   T(F): 96.9  HR: 70  BP: 92/60  RR: 18  SpO2: 100%    LABS:                        9.8    12.93 )-----------( 275      ( 13 Sep 2023 06:55 )             29.3     09-13    140  |  106  |  4<L>  ----------------------------<  87  3.4<L>   |  23  |  0.5<L>    Ca    7.6<L>      13 Sep 2023 11:47  Mg     2.5     09-13    TPro  5.1<L>  /  Alb  2.5<L>  /  TBili  <0.2  /  DBili  x   /  AST  11  /  ALT  6   /  AlkPhos  64  09-12    PT/INR - ( 12 Sep 2023 07:35 )   PT: 10.80 sec;   INR: 0.95 ratio         PTT - ( 12 Sep 2023 07:35 )  PTT:32.8 sec  Urinalysis Basic - ( 13 Sep 2023 11:47 )    Color: x / Appearance: x / SG: x / pH: x  Gluc: 87 mg/dL / Ketone: x  / Bili: x / Urobili: x   Blood: x / Protein: x / Nitrite: x   Leuk Esterase: x / RBC: x / WBC x   Sq Epi: x / Non Sq Epi: x / Bacteria: x            Culture - Tissue with Gram Stain (collected 13 Sep 2023 19:00)  Source: .Tissue left ankle  Gram Stain (14 Sep 2023 04:18):    No polymorphonuclear cells seen per low power field    No organisms seen per oil power field    Culture - Tissue with Gram Stain (collected 13 Sep 2023 18:58)  Source: .Tissue left ankle  Gram Stain (14 Sep 2023 04:18):    No polymorphonuclear cells seen per low power field    No organisms seen per oil power field              HOME MEDICATIONS:  amLODIPine 2.5 mg oral tablet: 1 orally once a day      MEDICATIONS:  STANDING MEDICATIONS  cefepime   IVPB      cefepime   IVPB 1000 milliGRAM(s) IV Intermittent every 12 hours  influenza   Vaccine 0.5 milliLiter(s) IntraMuscular once  lactated ringers. 1000 milliLiter(s) IV Continuous <Continuous>  metroNIDAZOLE  IVPB 500 milliGRAM(s) IV Intermittent every 8 hours  metroNIDAZOLE  IVPB      pantoprazole    Tablet 40 milliGRAM(s) Oral before breakfast  polyethylene glycol 3350 17 Gram(s) Oral daily    PRN MEDICATIONS  acetaminophen     Tablet .. 650 milliGRAM(s) Oral every 6 hours PRN  albuterol    90 MICROgram(s) HFA Inhaler 2 Puff(s) Inhalation every 6 hours PRN  benzocaine 20% Spray 1 Spray(s) Topical three times a day PRN  HYDROmorphone  Injectable 0.5 milliGRAM(s) IV Push every 10 minutes PRN  HYDROmorphone  Injectable 1 milliGRAM(s) IV Push every 10 minutes PRN  melatonin 3 milliGRAM(s) Oral at bedtime PRN  ondansetron Injectable 4 milliGRAM(s) IV Push every 6 hours PRN        
57-year-old female past medical history of asthma, hypertension, osteomyelitis of left ankle (not on Ab), prediabetes presented for 7-10 day history of difficulty urinating, found to have DILIP, urinary retention, but infection was mostly likely originating from LLE infected hard ware. She was consulted by ortho, hard wear was removed by  on 9/13.   While in the hospital pt failed TOV twice, getting straight cath now.   Today pt is c/o headache, c/o left foot pain at times.       Vital Signs Last 24 Hrs  T(C): 36.6 (18 Sep 2023 05:00), Max: 36.7 (17 Sep 2023 20:30)  T(F): 97.9 (18 Sep 2023 05:00), Max: 98.1 (17 Sep 2023 20:30)  HR: 66 (18 Sep 2023 05:00) (66 - 73)  BP: 127/67 (18 Sep 2023 05:00) (113/61 - 127/67)  BP(mean): --  RR: 18 (18 Sep 2023 05:00) (18 - 18)      PHYSICAL EXAM:  GENERAL: NAD, skinny lady, pleasant   HEAD:  Atraumatic, Normocephalic  NECK: Supple, No JVD, Normal thyroid  NERVOUS SYSTEM:  Alert & Oriented X3, Good concentration; Motor Strength 5/5 B/L upper and lower extremities; DTRs 2+ intact and symmetric  CHEST/LUNG: CTA b/l   HEART: S1, S2, RRR   ABDOMEN: Soft, Nontender, Nondistended; Bowel sounds present  EXTREMITIES:  2+ Peripheral Pulses, No clubbing, cyanosis, or edema, dressing noted on left ankle       LABS:                                   8.5    7.09  )-----------( 362      ( 18 Sep 2023 06:13 )             26.4   09-18    142  |  109  |  6<L>  ----------------------------<  84  3.6   |  23  |  0.6<L>    Ca    8.1<L>      18 Sep 2023 06:13  Mg     1.6     09-18    TPro  4.8<L>  /  Alb  2.6<L>  /  TBili  <0.2  /  DBili  x   /  AST  10  /  ALT  <5  /  AlkPhos  63  09-18    Urinalysis Basic - ( 13 Sep 2023 11:47 )    Color: x / Appearance: x / SG: x / pH: x  Gluc: 87 mg/dL / Ketone: x  / Bili: x / Urobili: x   Blood: x / Protein: x / Nitrite: x   Leuk Esterase: x / RBC: x / WBC x   Sq Epi: x / Non Sq Epi: x / Bacteria: x    Culture - Blood in AM (09.08.23 @ 06:33)   Specimen Source: .Blood None  Culture Results:   No growth at 5 daysCulture - Urine (09.07.23 @ 16:00)   Specimen Source: Clean Catch Clean Catch (Midstream)  Culture Results:   No growth  Culture - Tissue with Gram Stain (09.13.23 @ 19:00)   Gram Stain:   No polymorphonuclear cells seen per low power field   No organisms seen per oil power field  Specimen Source: .Tissue left ankleCulture - Tissue with Gram Stain (09.13.23 @ 18:58)   Gram Stain:   No polymorphonuclear cells seen per low power field   No organisms seen per oil power field  Specimen Source: .Tissue left ankle  Culture - Other (09.13.23 @ 19:01)   - Ampicillin/Sulbactam: S <=8/4  - Cefazolin: S <=4  - Clindamycin: S <=0.25  - Erythromycin: R >4  - Gentamicin: S <=1 Should not be used as monotherapy  - Oxacillin: S <=0.25 Oxacillin predicts susceptibility for dicloxacillin, methicillin, and nafcillin  - Penicillin: R >8  - Rifampin: S <=1 Should not be used as monotherapy  - Tetracycline: S <=1  - Trimethoprim/Sulfamethoxazole: S <=0.5/9.5  - Vancomycin: S 2  Specimen Source: .Other None  Culture Results:   Rare Staphylococcus aureus  Organism Identification: Staphylococcus aureus    RADIOLOGY & ADDITIONAL TESTS:  < from: Xray Foot AP + Lateral + Oblique, Left (09.08.23 @ 13:54) >  FINDINGS/  IMPRESSION:    The patient is status post screw and plate fixation of the distal fibula.   There is a subacute or chronic minimally displaced incompletely healed   fracture of the fibula at the proximal margin of the hardware,   demonstrating incomplete bony bridging/callus formation. The hardware is   intact.    There is no radiographic evidence of osteomyelitis. No acute fracture is   seen. There is no dislocation.There is no definite ankle mortise   asymmetry.    Osteopenia is noted. There is evidence of skin ulceration over the   lateral malleolus/fibular plate.    < from: CT Abdomen and Pelvis w/ IV Cont (09.07.23 @ 16:55) >  IMPRESSION:    Diffuse urinary bladder wall thickening, mucosal hyperenhancement,   compatible with urinary bladder infection, cystitis.  Increased right renal pelvis and ureter urothelial enhancement,   compatible with ascending urinary tract infection.    < from: CT Neck Soft Tissue w/ IV Cont (09.07.23 @ 16:54) >  IMPRESSION:    1.  Multiple dental caries and periapical lucencies. Recommend follow-up   with dental exam.    2.  Apparent 3 mm rim-enhancing fluid collection overlying the right   mandibular alveolar ridge, potentially a tiny abscess (ser 4 im 87).    3.  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    4.  Scattered groundglass opacities and tree-in-bud nodules. Etiology   favors infection/inflammation.    MEDICATIONS  (STANDING):  acetaminophen     Tablet .. 975 milliGRAM(s) Oral every 8 hours  cefTRIAXone   IVPB 2000 milliGRAM(s) IV Intermittent every 24 hours  chlorhexidine 2% Cloths 1 Application(s) Topical <User Schedule>  heparin   Injectable 5000 Unit(s) SubCutaneous every 12 hours  influenza   Vaccine 0.5 milliLiter(s) IntraMuscular once  magnesium sulfate  IVPB 2 Gram(s) IV Intermittent every 2 hours  metroNIDAZOLE  IVPB 500 milliGRAM(s) IV Intermittent every 8 hours  metroNIDAZOLE  IVPB      pantoprazole    Tablet 40 milliGRAM(s) Oral before breakfast  polyethylene glycol 3350 17 Gram(s) Oral daily  sodium chloride 0.9%. 1000 milliLiter(s) (75 mL/Hr) IV Continuous <Continuous>    MEDICATIONS  (PRN):  albuterol    90 MICROgram(s) HFA Inhaler 2 Puff(s) Inhalation every 6 hours PRN Shortness of Breath and/or Wheezing  benzocaine 20% Spray 1 Spray(s) Topical three times a day PRN throat pain  HYDROmorphone  Injectable 1 milliGRAM(s) IV Push every 4 hours PRN Severe Pain (7 - 10)  melatonin 3 milliGRAM(s) Oral at bedtime PRN Sleep  ondansetron Injectable 4 milliGRAM(s) IV Push every 6 hours PRN Nausea and/or Vomiting              
ORTHOPEDIC POST-OP CHECK    Subjective: POD0 s/p L ankle I&D, removal of hardware. Seen and examined at bedside. Doing well, pain controlled. Denies fevers, numbness/tingling. No other complaints.    MEDICATIONS  (STANDING):  cefepime   IVPB      influenza   Vaccine 0.5 milliLiter(s) IntraMuscular once  lactated ringers. 1000 milliLiter(s) (75 mL/Hr) IV Continuous <Continuous>  metroNIDAZOLE  IVPB 500 milliGRAM(s) IV Intermittent every 8 hours  metroNIDAZOLE  IVPB      pantoprazole    Tablet 40 milliGRAM(s) Oral before breakfast  polyethylene glycol 3350 17 Gram(s) Oral daily    MEDICATIONS  (PRN):  acetaminophen     Tablet .. 650 milliGRAM(s) Oral every 6 hours PRN Temp greater or equal to 38C (100.4F), Mild Pain (1 - 3)  albuterol    90 MICROgram(s) HFA Inhaler 2 Puff(s) Inhalation every 6 hours PRN Shortness of Breath and/or Wheezing  benzocaine 20% Spray 1 Spray(s) Topical three times a day PRN throat pain  melatonin 3 milliGRAM(s) Oral at bedtime PRN Sleep  ondansetron Injectable 4 milliGRAM(s) IV Push every 6 hours PRN Nausea and/or Vomiting      Objective:  T(C): 35.6 (09-13-23 @ 19:46), Max: 36.8 (09-13-23 @ 15:30)  HR: 68 (09-13-23 @ 19:46) (58 - 116)  BP: 106/56 (09-13-23 @ 19:46) (95/56 - 140/67)  RR: 18 (09-13-23 @ 19:46) (16 - 18)  SpO2: 100% (09-13-23 @ 18:25) (95% - 100%)    Physical Exam:    LLE:  Splint/Dressing c/d/i  Compartments soft/compressible  SILT of exposed toes  Motor intact EHL/FHL  Digits wwp    Labs:                        9.8    12.93 )-----------( 275      ( 13 Sep 2023 06:55 )             29.3     09-13    140  |  106  |  4<L>  ----------------------------<  87  3.4<L>   |  23  |  0.5<L>    Ca    7.6<L>      13 Sep 2023 11:47  Mg     2.5     09-13    TPro  5.1<L>  /  Alb  2.5<L>  /  TBili  <0.2  /  DBili  x   /  AST  11  /  ALT  6   /  AlkPhos  64  09-12      A/P: 57yFemale POD0 s/p L ankle I&D, removal of hardware on 9/13/23, doing well.    - Activity: NWB LLE x 2 weeks until wound heals; then if wounds stable plan to transition to WBAT in CAM boot  - Abx per primary/ID: flagyl, cefepime  - DVT PPx: SQH per primary team; restart on POD1  - Pain control  - IS encouraged  - AM labs  - PT/Rehab  - D/C planning  
Orthopaedic Surgery Progress Note    S&E this AM. Pain well controlled. No other complaints. Patient does not want to go to [facility] but amenable to acute rehab. Cultures growing rare staph aureus.      T(C): 36.3 (09-16-23 @ 05:05), Max: 37 (09-15-23 @ 19:44)  HR: 86 (09-16-23 @ 05:05) (65 - 86)  BP: 109/68 (09-16-23 @ 05:05) (106/60 - 117/66)  RR: 18 (09-16-23 @ 05:05) (18 - 18)  SpO2: --    P/E:  Alert, NAD  Nonlabored breathing    LLE  Dressing/splint c/d/i  SILT of exposed toes  Firing EHL/FHL  Foot WWP    Labs                        9.0    10.66 )-----------( 258      ( 15 Sep 2023 07:44 )             26.9     09-15    139  |  108  |  5<L>  ----------------------------<  82  3.5   |  20  |  0.6<L>    Ca    7.8<L>      15 Sep 2023 07:44  Mg     1.7     09-15    TPro  4.8<L>  /  Alb  2.5<L>  /  TBili  <0.2  /  DBili  x   /  AST  11  /  ALT  6   /  AlkPhos  63  09-15    LIVER FUNCTIONS - ( 15 Sep 2023 07:44 )  Alb: 2.5 g/dL / Pro: 4.8 g/dL / ALK PHOS: 63 U/L / ALT: 6 U/L / AST: 11 U/L / GGT: x                     A/P:   58yo Female s/p L ankle irrigation and debridement with ROSA on 9/13/23. Cultures growing rare S. aureus.    Weightbearing: NWB LLE in splint 2 weeks minimum.   if wounds are ok will upgrade wb to wbat in cam boot   FU OR cultures - growing rare S. aureus 9/15  Abx: CTX/flagyl  DVT ppx: SCD, heparin  Pain control  Incentive spirometer  Home meds  Trend labs  PT/OT/Rehab  Dispo: Pending PT recommendations  UPON DISCHARGE NEEDS FOLLOW UP WITH DR YU 2 WEEKS POST OP           
Orthopaedic Surgery Progress Note    S&E this AM. Pain well controlled. No other complaints. Patient does not want to go to [facility] but amenable to acute rehab. Cultures growing rare MSSA.      T(C): 36.2 (09-17-23 @ 06:07), Max: 36.7 (09-16-23 @ 19:44)  HR: 65 (09-17-23 @ 06:07) (65 - 85)  BP: 119/62 (09-17-23 @ 06:07) (119/62 - 142/85)  RR: 18 (09-17-23 @ 06:07) (18 - 19)  SpO2: 91% (09-17-23 @ 06:07) (91% - 91%)    P/E:  Alert, NAD  Nonlabored breathing    LLE  Dressing/splint c/d/i  SILT of exposed toes  Firing EHL/FHL  Foot WWP    Labs                        8.8    10.36 )-----------( 288      ( 16 Sep 2023 07:50 )             26.4     09-16    140  |  109  |  5<L>  ----------------------------<  107<H>  4.0   |  21  |  0.6<L>    Ca    8.0<L>      16 Sep 2023 07:50  Mg     1.4     09-16    TPro  4.8<L>  /  Alb  2.5<L>  /  TBili  <0.2  /  DBili  x   /  AST  9   /  ALT  5   /  AlkPhos  60  09-16    LIVER FUNCTIONS - ( 16 Sep 2023 07:50 )  Alb: 2.5 g/dL / Pro: 4.8 g/dL / ALK PHOS: 60 U/L / ALT: 5 U/L / AST: 9 U/L / GGT: x               A/P:   58yo Female s/p L ankle irrigation and debridement with ROSA on 9/13/23.    Cultures growing rare MSSA  Follow up ID for final abx recommendations    Weightbearing: NWB LLE in splint 2 weeks minimum.   if wounds are ok will upgrade wb to wbat in cam boot   FU OR cultures - growing rare MSSA   Abx: CTX/flagyl  DVT ppx: SCD, heparin  Pain control  Incentive spirometer  Home meds  Trend labs  PT/OT/Rehab  Dispo: Pending PT recommendations  UPON DISCHARGE NEEDS FOLLOW UP WITH DR YU 2 WEEKS POST OP     
     Pt seen and examined at bedside.   No SOB, CP.       VITAL SIGNS (Last 24 hrs):  T(C): 36.7 (09-11-23 @ 05:11), Max: 37.2 (09-10-23 @ 14:32)  HR: 64 (09-11-23 @ 05:11) (64 - 68)  BP: 107/59 (09-11-23 @ 05:11) (91/57 - 107/59)  RR: 18 (09-11-23 @ 05:11) (18 - 18)  SpO2: 98% (09-10-23 @ 19:20) (98% - 98%)  Wt(kg): --  Daily Height in cm: 149.86 (10 Sep 2023 19:29)    Daily     I&O's Summary    10 Sep 2023 07:01  -  11 Sep 2023 07:00  ------------------------------------------------   IN: 0 mL / OUT: 175 mL / NET: -175 mL        PHYSICAL EXAM:  GENERAL: NAD   HEAD:  Atraumatic, Normocephalic  EYES:  conjunctiva and sclera clear  NECK: Supple, No JVD  CHEST/LUNG: Clear to auscultation bilaterally; No wheeze  HEART: Regular rate and rhythm; No murmurs, rubs, or gallops  ABDOMEN: Soft, Nontender, Nondistended; Bowel sounds present  EXTREMITIES: left ankle dressing c/d/i   PSYCH: AAOx3  NEUROLOGY: non-focal  SKIN: No rashes or lesions        Labs Reviewed          CBC Full  -  ( 09 Sep 2023 07:41 )  WBC Count : 10.66 K/uL  Hemoglobin : 9.0 g/dL  Hematocrit : 26.6 %  Platelet Count - Automated : 356 K/uL  Mean Cell Volume : 94.0 fL  Mean Cell Hemoglobin : 31.8 pg  Mean Cell Hemoglobin Concentration : 33.8 g/dL  Auto Neutrophil # : 8.09 K/uL  Auto Lymphocyte # : 1.59 K/uL  Auto Monocyte # : 0.74 K/uL  Auto Eosinophil # : 0.14 K/uL  Auto Basophil # : 0.05 K/uL  Auto Neutrophil % : 75.9 %  Auto Lymphocyte % : 14.9 %  Auto Monocyte % : 6.9 %  Auto Eosinophil % : 1.3 %  Auto Basophil % : 0.5 %    BMP:    09-09 @ 07:41    Blood Urea Nitrogen - 27  Calcium - 8.5  Carbond Dioxide - 17  Chloride - 106  Creatinine - 1.2  Glucose - 79  Potassium - 4.0  Sodium - 135      Hemoglobin A1c -     Urine Culture:  09-08 @ 06:33 Urine culture: --    Culture Results:   No growth at 48 Hours  Method Type: --  Organism: --  Organism Identification: --  Specimen Source: .Blood None  09-08 @ 01:36 Urine culture: --    Culture Results:   No growth at 72 Hours  Method Type: --  Organism: --  Organism Identification: --  Specimen Source: .Blood None  09-07 @ 16:00 Urine culture: --    Culture Results:   No growth  Method Type: --  Organism: --  Organism Identification: --  Specimen Source: Clean Catch Clean Catch (Midstream)            MEDICATIONS  (STANDING):  cefepime   IVPB      cefepime   IVPB 1000 milliGRAM(s) IV Intermittent every 12 hours  heparin   Injectable 5000 Unit(s) SubCutaneous every 12 hours  influenza   Vaccine 0.5 milliLiter(s) IntraMuscular once  metroNIDAZOLE  IVPB      metroNIDAZOLE  IVPB 500 milliGRAM(s) IV Intermittent every 8 hours  pantoprazole    Tablet 40 milliGRAM(s) Oral before breakfast  polyethylene glycol 3350 17 Gram(s) Oral daily    MEDICATIONS  (PRN):  acetaminophen     Tablet .. 650 milliGRAM(s) Oral every 6 hours PRN Temp greater or equal to 38C (100.4F), Mild Pain (1 - 3)  albuterol    90 MICROgram(s) HFA Inhaler 2 Puff(s) Inhalation every 6 hours PRN Shortness of Breath and/or Wheezing  benzocaine 20% Spray 1 Spray(s) Topical three times a day PRN throat pain  ondansetron Injectable 4 milliGRAM(s) IV Push every 6 hours PRN Nausea and/or Vomiting  
24H events:    Patient is a 57y old Female who presents with a chief complaint of urinary symptoms (09 Sep 2023 12:34)    Primary diagnosis of Urinary retention        PAST MEDICAL & SURGICAL HISTORY    SOCIAL HISTORY:  Social History:      ALLERGIES:  penicillin (Anaphylaxis)      VITALS:   T(F): 96.1  HR: 70  BP: 110/59  RR: 18  SpO2: 98%    LABS:                        9.0    10.66 )-----------( 356      ( 09 Sep 2023 07:41 )             26.6     09-09    135  |  106  |  27<H>  ----------------------------<  79  4.0   |  17  |  1.2    Ca    8.5      09 Sep 2023 07:41  Phos  4.2     09-08  Mg     1.7     09-09    TPro  6.4  /  Alb  3.3<L>  /  TBili  <0.2  /  DBili  x   /  AST  14  /  ALT  9   /  AlkPhos  90  09-08      Urinalysis Basic - ( 09 Sep 2023 07:41 )    Color: x / Appearance: x / SG: x / pH: x  Gluc: 79 mg/dL / Ketone: x  / Bili: x / Urobili: x   Blood: x / Protein: x / Nitrite: x   Leuk Esterase: x / RBC: x / WBC x   Sq Epi: x / Non Sq Epi: x / Bacteria: x            Culture - Blood (collected 08 Sep 2023 01:36)  Source: .Blood None  Preliminary Report (09 Sep 2023 07:02):    No growth at 24 hours    Culture - Urine (collected 07 Sep 2023 16:00)  Source: Clean Catch Clean Catch (Midstream)  Final Report (08 Sep 2023 22:28):    No growth              HOME MEDICATIONS:  amLODIPine 2.5 mg oral tablet: 1 orally once a day      MEDICATIONS:  STANDING MEDICATIONS  cefepime   IVPB      cefepime   IVPB 1000 milliGRAM(s) IV Intermittent every 12 hours  heparin   Injectable 5000 Unit(s) SubCutaneous every 12 hours  influenza   Vaccine 0.5 milliLiter(s) IntraMuscular once  lactated ringers. 500 milliLiter(s) IV Continuous <Continuous>  magnesium sulfate  IVPB 1 Gram(s) IV Intermittent once  metroNIDAZOLE  IVPB      metroNIDAZOLE  IVPB 500 milliGRAM(s) IV Intermittent every 8 hours  pantoprazole    Tablet 40 milliGRAM(s) Oral before breakfast  polyethylene glycol 3350 17 Gram(s) Oral daily    PRN MEDICATIONS  acetaminophen     Tablet .. 650 milliGRAM(s) Oral every 6 hours PRN  albuterol    90 MICROgram(s) HFA Inhaler 2 Puff(s) Inhalation every 6 hours PRN  benzocaine 20% Spray 1 Spray(s) Topical three times a day PRN  ondansetron Injectable 4 milliGRAM(s) IV Push every 6 hours PRN        
24H events:    Patient is a 57y old Female who presents with a chief complaint of urinary symptoms (14 Sep 2023 15:08)    Primary diagnosis of Urinary retention        PAST MEDICAL & SURGICAL HISTORY    SOCIAL HISTORY:  Social History:      ALLERGIES:  penicillin (Anaphylaxis)      VITALS:   T(F): 96.8  HR: 65  BP: 95/64  RR: 18  SpO2: 96%    LABS:                        9.0    10.66 )-----------( 258      ( 15 Sep 2023 07:44 )             26.9     09-14    138  |  107  |  5<L>  ----------------------------<  98  3.9   |  23  |  0.6<L>    Ca    7.8<L>      14 Sep 2023 07:37  Mg     1.6     09-14    TPro  x   /  Alb  2.3<L>  /  TBili  x   /  DBili  x   /  AST  x   /  ALT  x   /  AlkPhos  x   09-14      Urinalysis Basic - ( 14 Sep 2023 07:37 )    Color: x / Appearance: x / SG: x / pH: x  Gluc: 98 mg/dL / Ketone: x  / Bili: x / Urobili: x   Blood: x / Protein: x / Nitrite: x   Leuk Esterase: x / RBC: x / WBC x   Sq Epi: x / Non Sq Epi: x / Bacteria: x            Culture - Surgical Swab (collected 13 Sep 2023 19:02)  Source: .Surgical Swab LEFT ANKLE  Preliminary Report (15 Sep 2023 06:34):    No growth    Culture - Acid Fast - Other w/Smear (collected 13 Sep 2023 19:01)  Source: .Other None    Culture - Acid Fast - Other w/Smear (collected 13 Sep 2023 19:01)  Source: .Other None    Culture - Fungal, Other (collected 13 Sep 2023 19:01)  Source: .Other None  Preliminary Report (15 Sep 2023 07:30):    Testing in progress    Culture - Fungal, Other (collected 13 Sep 2023 19:01)  Source: .Other None  Preliminary Report (14 Sep 2023 14:07):    Testing in progress    Culture - Other (collected 13 Sep 2023 19:01)  Source: .Other None  Preliminary Report (14 Sep 2023 22:57):    No growth    Culture - Fungal, Tissue (collected 13 Sep 2023 19:00)  Source: .Tissue left ankle  Preliminary Report (14 Sep 2023 14:13):    Testing in progress    Culture - Acid Fast - Tissue w/Smear (collected 13 Sep 2023 19:00)  Source: .Tissue left ankle    Culture - Tissue with Gram Stain (collected 13 Sep 2023 19:00)  Source: .Tissue left ankle  Gram Stain (14 Sep 2023 04:18):    No polymorphonuclear cells seen per low power field    No organisms seen per oil power field  Preliminary Report (15 Sep 2023 06:59):    No growth    Culture - Fungal, Tissue (collected 13 Sep 2023 19:00)  Source: .Tissue left ankle  Preliminary Report (15 Sep 2023 07:42):    Testing in progress    Culture - Acid Fast - Tissue w/Smear (collected 13 Sep 2023 19:00)  Source: .Tissue left ankle    Culture - Tissue with Gram Stain (collected 13 Sep 2023 18:58)  Source: .Tissue left ankle  Gram Stain (14 Sep 2023 04:18):    No polymorphonuclear cells seen per low power field    No organisms seen per oil power field  Preliminary Report (15 Sep 2023 07:00):    No growth              HOME MEDICATIONS:  amLODIPine 2.5 mg oral tablet: 1 orally once a day      MEDICATIONS:  STANDING MEDICATIONS  acetaminophen     Tablet .. 975 milliGRAM(s) Oral every 8 hours  cefepime   IVPB      cefepime   IVPB 1000 milliGRAM(s) IV Intermittent every 12 hours  heparin   Injectable 5000 Unit(s) SubCutaneous every 12 hours  influenza   Vaccine 0.5 milliLiter(s) IntraMuscular once  metroNIDAZOLE  IVPB      metroNIDAZOLE  IVPB 500 milliGRAM(s) IV Intermittent every 8 hours  pantoprazole    Tablet 40 milliGRAM(s) Oral before breakfast  polyethylene glycol 3350 17 Gram(s) Oral daily  sodium chloride 0.9%. 1000 milliLiter(s) IV Continuous <Continuous>    PRN MEDICATIONS  albuterol    90 MICROgram(s) HFA Inhaler 2 Puff(s) Inhalation every 6 hours PRN  benzocaine 20% Spray 1 Spray(s) Topical three times a day PRN  HYDROmorphone  Injectable 1 milliGRAM(s) IV Push every 4 hours PRN  melatonin 3 milliGRAM(s) Oral at bedtime PRN  ondansetron Injectable 4 milliGRAM(s) IV Push every 6 hours PRN        
24H events:    Patient is a 57y old Female who presents with a chief complaint of urinary symptoms (16 Sep 2023 12:36)    Primary diagnosis of Urinary retention        PAST MEDICAL & SURGICAL HISTORY    SOCIAL HISTORY:  Social History:      ALLERGIES:  penicillin (Anaphylaxis)      VITALS:   T(F): 98.1  HR: 75  BP: 122/75  RR: 18  SpO2: --    LABS:                        8.8    10.36 )-----------( 288      ( 16 Sep 2023 07:50 )             26.4     09-16    140  |  109  |  5<L>  ----------------------------<  107<H>  4.0   |  21  |  0.6<L>    Ca    8.0<L>      16 Sep 2023 07:50  Mg     1.4     09-16    TPro  4.8<L>  /  Alb  2.5<L>  /  TBili  <0.2  /  DBili  x   /  AST  9   /  ALT  5   /  AlkPhos  60  09-16      Urinalysis Basic - ( 16 Sep 2023 07:50 )    Color: x / Appearance: x / SG: x / pH: x  Gluc: 107 mg/dL / Ketone: x  / Bili: x / Urobili: x   Blood: x / Protein: x / Nitrite: x   Leuk Esterase: x / RBC: x / WBC x   Sq Epi: x / Non Sq Epi: x / Bacteria: x                    HOME MEDICATIONS:  amLODIPine 2.5 mg oral tablet: 1 orally once a day      MEDICATIONS:  STANDING MEDICATIONS  acetaminophen     Tablet .. 975 milliGRAM(s) Oral every 8 hours  cefTRIAXone   IVPB 2000 milliGRAM(s) IV Intermittent every 24 hours  chlorhexidine 2% Cloths 1 Application(s) Topical <User Schedule>  heparin   Injectable 5000 Unit(s) SubCutaneous every 12 hours  influenza   Vaccine 0.5 milliLiter(s) IntraMuscular once  metroNIDAZOLE  IVPB 500 milliGRAM(s) IV Intermittent every 8 hours  metroNIDAZOLE  IVPB      pantoprazole    Tablet 40 milliGRAM(s) Oral before breakfast  polyethylene glycol 3350 17 Gram(s) Oral daily  sodium chloride 0.9%. 1000 milliLiter(s) IV Continuous <Continuous>    PRN MEDICATIONS  albuterol    90 MICROgram(s) HFA Inhaler 2 Puff(s) Inhalation every 6 hours PRN  benzocaine 20% Spray 1 Spray(s) Topical three times a day PRN  HYDROmorphone  Injectable 1 milliGRAM(s) IV Push every 4 hours PRN  melatonin 3 milliGRAM(s) Oral at bedtime PRN  ondansetron Injectable 4 milliGRAM(s) IV Push every 6 hours PRN        
57-year-old female past medical history of asthma, hypertension, osteomyelitis of left ankle (not on Ab), prediabetes presented for 7-10 day history of difficulty urinating, found to have DILIP, urinary retention, but infection was mostly likely originating from LLE infected hard ware. She was consulted by ortho, hard wear was removed by  on 9/13.   This morning pt was c/o left leg pain, agreeable for PT, willing to start TOV, daughter   at the bedside.        Vital Signs Last 24 Hrs  T(C): 36.1 (14 Sep 2023 04:57), Max: 36.8 (13 Sep 2023 15:30)  T(F): 96.9 (14 Sep 2023 04:57), Max: 98.2 (13 Sep 2023 15:30)  HR: 70 (14 Sep 2023 04:57) (60 - 116)  BP: 92/60 (14 Sep 2023 04:57) (92/60 - 140/67)  BP(mean): --  RR: 18 (14 Sep 2023 04:57) (16 - 18)  SpO2: 100% (13 Sep 2023 18:25) (95% - 100%)    Parameters below as of 13 Sep 2023 18:25  Patient On (Oxygen Delivery Method): room air          PHYSICAL EXAM:  GENERAL: NAD, skinny lady, pleasant   HEAD:  Atraumatic, Normocephalic  NECK: Supple, No JVD, Normal thyroid  NERVOUS SYSTEM:  Alert & Oriented X3, Good concentration; Motor Strength 5/5 B/L upper and lower extremities; DTRs 2+ intact and symmetric  CHEST/LUNG: CTA b/l   HEART: S1, S2, RRR   ABDOMEN: Soft, Nontender, Nondistended; Bowel sounds present  EXTREMITIES:  2+ Peripheral Pulses, No clubbing, cyanosis, or edema, dressing noted on left ankle       LABS:                                   8.5    13.59 )-----------( 264      ( 14 Sep 2023 07:37 )             25.7   09-14    138  |  107  |  5<L>  ----------------------------<  98  3.9   |  23  |  0.6<L>    Ca    7.8<L>      14 Sep 2023 07:37  Mg     1.6     09-14    TPro  x   /  Alb  2.3<L>  /  TBili  x   /  DBili  x   /  AST  x   /  ALT  x   /  AlkPhos  x   09-14    PTT - ( 12 Sep 2023 07:35 )  PTT:32.8 sec  Urinalysis Basic - ( 13 Sep 2023 11:47 )    Color: x / Appearance: x / SG: x / pH: x  Gluc: 87 mg/dL / Ketone: x  / Bili: x / Urobili: x   Blood: x / Protein: x / Nitrite: x   Leuk Esterase: x / RBC: x / WBC x   Sq Epi: x / Non Sq Epi: x / Bacteria: x    Culture - Blood in AM (09.08.23 @ 06:33)   Specimen Source: .Blood None  Culture Results:   No growth at 5 daysCulture - Urine (09.07.23 @ 16:00)   Specimen Source: Clean Catch Clean Catch (Midstream)  Culture Results:   No growth  Culture - Tissue with Gram Stain (09.13.23 @ 19:00)   Gram Stain:   No polymorphonuclear cells seen per low power field   No organisms seen per oil power field  Specimen Source: .Tissue left ankleCulture - Tissue with Gram Stain (09.13.23 @ 18:58)   Gram Stain:   No polymorphonuclear cells seen per low power field   No organisms seen per oil power field  Specimen Source: .Tissue left ankle    RADIOLOGY & ADDITIONAL TESTS:  < from: Xray Foot AP + Lateral + Oblique, Left (09.08.23 @ 13:54) >  FINDINGS/  IMPRESSION:    The patient is status post screw and plate fixation of the distal fibula.   There is a subacute or chronic minimally displaced incompletely healed   fracture of the fibula at the proximal margin of the hardware,   demonstrating incomplete bony bridging/callus formation. The hardware is   intact.    There is no radiographic evidence of osteomyelitis. No acute fracture is   seen. There is no dislocation.There is no definite ankle mortise   asymmetry.    Osteopenia is noted. There is evidence of skin ulceration over the   lateral malleolus/fibular plate.    < from: CT Abdomen and Pelvis w/ IV Cont (09.07.23 @ 16:55) >  IMPRESSION:    Diffuse urinary bladder wall thickening, mucosal hyperenhancement,   compatible with urinary bladder infection, cystitis.  Increased right renal pelvis and ureter urothelial enhancement,   compatible with ascending urinary tract infection.    < from: CT Neck Soft Tissue w/ IV Cont (09.07.23 @ 16:54) >  IMPRESSION:    1.  Multiple dental caries and periapical lucencies. Recommend follow-up   with dental exam.    2.  Apparent 3 mm rim-enhancing fluid collection overlying the right   mandibular alveolar ridge, potentially a tiny abscess (ser 4 im 87).    3.  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    4.  Scattered groundglass opacities and tree-in-bud nodules. Etiology   favors infection/inflammation.    MEDICATIONS  (STANDING):  acetaminophen     Tablet .. 975 milliGRAM(s) Oral every 8 hours  cefepime   IVPB 1000 milliGRAM(s) IV Intermittent every 12 hours  cefepime   IVPB      heparin   Injectable 5000 Unit(s) SubCutaneous every 12 hours  influenza   Vaccine 0.5 milliLiter(s) IntraMuscular once  metroNIDAZOLE  IVPB      metroNIDAZOLE  IVPB 500 milliGRAM(s) IV Intermittent every 8 hours  pantoprazole    Tablet 40 milliGRAM(s) Oral before breakfast  polyethylene glycol 3350 17 Gram(s) Oral daily  sodium chloride 0.9%. 1000 milliLiter(s) (75 mL/Hr) IV Continuous <Continuous>    MEDICATIONS  (PRN):  albuterol    90 MICROgram(s) HFA Inhaler 2 Puff(s) Inhalation every 6 hours PRN Shortness of Breath and/or Wheezing  benzocaine 20% Spray 1 Spray(s) Topical three times a day PRN throat pain  HYDROmorphone  Injectable 1 milliGRAM(s) IV Push every 4 hours PRN Severe Pain (7 - 10)  melatonin 3 milliGRAM(s) Oral at bedtime PRN Sleep  ondansetron Injectable 4 milliGRAM(s) IV Push every 6 hours PRN Nausea and/or Vomiting        
57-year-old female past medical history of asthma, hypertension, osteomyelitis of left ankle (not on Ab), prediabetes presented for 7-10 day history of difficulty urinating, found to have DILIP, urinary retention, but infection was mostly likely originating from LLE infected hard ware. She was consulted by ortho, hard wear was removed by  on 9/13.   While in the hospital pt failed TOV twice, getting straight cath now.   Today pt is comfortable, denies any specific complaints, agreeable for SNF now.       Vital Signs Last 24 Hrs  T(C): 36.3 (16 Sep 2023 05:05), Max: 37 (15 Sep 2023 19:44)  T(F): 97.3 (16 Sep 2023 05:05), Max: 98.6 (15 Sep 2023 19:44)  HR: 86 (16 Sep 2023 05:05) (65 - 86)  BP: 109/68 (16 Sep 2023 05:05) (106/60 - 117/66)  BP(mean): --  RR: 18 (16 Sep 2023 05:05) (18 - 18)        PHYSICAL EXAM:  GENERAL: NAD, skinny lady, pleasant   HEAD:  Atraumatic, Normocephalic  NECK: Supple, No JVD, Normal thyroid  NERVOUS SYSTEM:  Alert & Oriented X3, Good concentration; Motor Strength 5/5 B/L upper and lower extremities; DTRs 2+ intact and symmetric  CHEST/LUNG: CTA b/l   HEART: S1, S2, RRR   ABDOMEN: Soft, Nontender, Nondistended; Bowel sounds present  EXTREMITIES:  2+ Peripheral Pulses, No clubbing, cyanosis, or edema, dressing noted on left ankle       LABS:                                   8.8    10.36 )-----------( 288      ( 16 Sep 2023 07:50 )             26.4   09-16    140  |  109  |  5<L>  ----------------------------<  107<H>  4.0   |  21  |  0.6<L>    Ca    8.0<L>      16 Sep 2023 07:50  Mg     1.4     09-16    TPro  4.8<L>  /  Alb  2.5<L>  /  TBili  <0.2  /  DBili  x   /  AST  9   /  ALT  5   /  AlkPhos  60  09-16      Urinalysis Basic - ( 13 Sep 2023 11:47 )    Color: x / Appearance: x / SG: x / pH: x  Gluc: 87 mg/dL / Ketone: x  / Bili: x / Urobili: x   Blood: x / Protein: x / Nitrite: x   Leuk Esterase: x / RBC: x / WBC x   Sq Epi: x / Non Sq Epi: x / Bacteria: x    Culture - Blood in AM (09.08.23 @ 06:33)   Specimen Source: .Blood None  Culture Results:   No growth at 5 daysCulture - Urine (09.07.23 @ 16:00)   Specimen Source: Clean Catch Clean Catch (Midstream)  Culture Results:   No growth  Culture - Tissue with Gram Stain (09.13.23 @ 19:00)   Gram Stain:   No polymorphonuclear cells seen per low power field   No organisms seen per oil power field  Specimen Source: .Tissue left ankleCulture - Tissue with Gram Stain (09.13.23 @ 18:58)   Gram Stain:   No polymorphonuclear cells seen per low power field   No organisms seen per oil power field  Specimen Source: .Tissue left ankle        RADIOLOGY & ADDITIONAL TESTS:  < from: Xray Foot AP + Lateral + Oblique, Left (09.08.23 @ 13:54) >  FINDINGS/  IMPRESSION:    The patient is status post screw and plate fixation of the distal fibula.   There is a subacute or chronic minimally displaced incompletely healed   fracture of the fibula at the proximal margin of the hardware,   demonstrating incomplete bony bridging/callus formation. The hardware is   intact.    There is no radiographic evidence of osteomyelitis. No acute fracture is   seen. There is no dislocation.There is no definite ankle mortise   asymmetry.    Osteopenia is noted. There is evidence of skin ulceration over the   lateral malleolus/fibular plate.    < from: CT Abdomen and Pelvis w/ IV Cont (09.07.23 @ 16:55) >  IMPRESSION:    Diffuse urinary bladder wall thickening, mucosal hyperenhancement,   compatible with urinary bladder infection, cystitis.  Increased right renal pelvis and ureter urothelial enhancement,   compatible with ascending urinary tract infection.    < from: CT Neck Soft Tissue w/ IV Cont (09.07.23 @ 16:54) >  IMPRESSION:    1.  Multiple dental caries and periapical lucencies. Recommend follow-up   with dental exam.    2.  Apparent 3 mm rim-enhancing fluid collection overlying the right   mandibular alveolar ridge, potentially a tiny abscess (ser 4 im 87).    3.  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    4.  Scattered groundglass opacities and tree-in-bud nodules. Etiology   favors infection/inflammation.    MEDICATIONS  (STANDING):  acetaminophen     Tablet .. 975 milliGRAM(s) Oral every 8 hours  cefTRIAXone   IVPB 2000 milliGRAM(s) IV Intermittent every 24 hours  chlorhexidine 2% Cloths 1 Application(s) Topical <User Schedule>  heparin   Injectable 5000 Unit(s) SubCutaneous every 12 hours  influenza   Vaccine 0.5 milliLiter(s) IntraMuscular once  magnesium sulfate  IVPB 2 Gram(s) IV Intermittent every 2 hours  metroNIDAZOLE  IVPB 500 milliGRAM(s) IV Intermittent every 8 hours  metroNIDAZOLE  IVPB      pantoprazole    Tablet 40 milliGRAM(s) Oral before breakfast  polyethylene glycol 3350 17 Gram(s) Oral daily  sodium chloride 0.9%. 1000 milliLiter(s) (75 mL/Hr) IV Continuous <Continuous>    MEDICATIONS  (PRN):  albuterol    90 MICROgram(s) HFA Inhaler 2 Puff(s) Inhalation every 6 hours PRN Shortness of Breath and/or Wheezing  benzocaine 20% Spray 1 Spray(s) Topical three times a day PRN throat pain  HYDROmorphone  Injectable 1 milliGRAM(s) IV Push every 4 hours PRN Severe Pain (7 - 10)  melatonin 3 milliGRAM(s) Oral at bedtime PRN Sleep  ondansetron Injectable 4 milliGRAM(s) IV Push every 6 hours PRN Nausea and/or Vomiting          
57-year-old female past medical history of asthma, hypertension, osteomyelitis of left ankle (not on Ab), prediabetes presented for 7-10 day history of difficulty urinating, found to have DILIP, urinary retention, but infection was mostly likely originating from LLE infected hard ware. She was consulted by ortho, hard wear was removed by  on 9/13.   While in the hospital pt failed TOV twice, getting straight cath now.   Today pt is comfortable, unable to urinate, getting straight cath.       Vital Signs Last 24 Hrs  T(C): 36.2 (17 Sep 2023 06:07), Max: 36.7 (16 Sep 2023 19:44)  T(F): 97.1 (17 Sep 2023 06:07), Max: 98.1 (16 Sep 2023 19:44)  HR: 65 (17 Sep 2023 06:07) (65 - 85)  BP: 119/62 (17 Sep 2023 06:07) (119/62 - 142/85)  BP(mean): --  RR: 18 (17 Sep 2023 06:07) (18 - 19)  SpO2: 91% (17 Sep 2023 06:07) (91% - 91%)        PHYSICAL EXAM:  GENERAL: NAD, skinny lady, pleasant   HEAD:  Atraumatic, Normocephalic  NECK: Supple, No JVD, Normal thyroid  NERVOUS SYSTEM:  Alert & Oriented X3, Good concentration; Motor Strength 5/5 B/L upper and lower extremities; DTRs 2+ intact and symmetric  CHEST/LUNG: CTA b/l   HEART: S1, S2, RRR   ABDOMEN: Soft, Nontender, Nondistended; Bowel sounds present  EXTREMITIES:  2+ Peripheral Pulses, No clubbing, cyanosis, or edema, dressing noted on left ankle       LABS:                                   8.6    8.78  )-----------( 339      ( 17 Sep 2023 07:13 )             27.0   09-17    140  |  107  |  5<L>  ----------------------------<  97  3.5   |  23  |  0.6<L>    Ca    7.8<L>      17 Sep 2023 07:13  Mg     1.9     09-17    TPro  4.7<L>  /  Alb  2.3<L>  /  TBili  <0.2  /  DBili  x   /  AST  10  /  ALT  <5  /  AlkPhos  62  09-17        Urinalysis Basic - ( 13 Sep 2023 11:47 )    Color: x / Appearance: x / SG: x / pH: x  Gluc: 87 mg/dL / Ketone: x  / Bili: x / Urobili: x   Blood: x / Protein: x / Nitrite: x   Leuk Esterase: x / RBC: x / WBC x   Sq Epi: x / Non Sq Epi: x / Bacteria: x    Culture - Blood in AM (09.08.23 @ 06:33)   Specimen Source: .Blood None  Culture Results:   No growth at 5 daysCulture - Urine (09.07.23 @ 16:00)   Specimen Source: Clean Catch Clean Catch (Midstream)  Culture Results:   No growth  Culture - Tissue with Gram Stain (09.13.23 @ 19:00)   Gram Stain:   No polymorphonuclear cells seen per low power field   No organisms seen per oil power field  Specimen Source: .Tissue left ankleCulture - Tissue with Gram Stain (09.13.23 @ 18:58)   Gram Stain:   No polymorphonuclear cells seen per low power field   No organisms seen per oil power field  Specimen Source: .Tissue left ankle  Culture - Other (09.13.23 @ 19:01)   - Ampicillin/Sulbactam: S <=8/4  - Cefazolin: S <=4  - Clindamycin: S <=0.25  - Erythromycin: R >4  - Gentamicin: S <=1 Should not be used as monotherapy  - Oxacillin: S <=0.25 Oxacillin predicts susceptibility for dicloxacillin, methicillin, and nafcillin  - Penicillin: R >8  - Rifampin: S <=1 Should not be used as monotherapy  - Tetracycline: S <=1  - Trimethoprim/Sulfamethoxazole: S <=0.5/9.5  - Vancomycin: S 2  Specimen Source: .Other None  Culture Results:   Rare Staphylococcus aureus  Organism Identification: Staphylococcus aureus      RADIOLOGY & ADDITIONAL TESTS:  < from: Xray Foot AP + Lateral + Oblique, Left (09.08.23 @ 13:54) >  FINDINGS/  IMPRESSION:    The patient is status post screw and plate fixation of the distal fibula.   There is a subacute or chronic minimally displaced incompletely healed   fracture of the fibula at the proximal margin of the hardware,   demonstrating incomplete bony bridging/callus formation. The hardware is   intact.    There is no radiographic evidence of osteomyelitis. No acute fracture is   seen. There is no dislocation.There is no definite ankle mortise   asymmetry.    Osteopenia is noted. There is evidence of skin ulceration over the   lateral malleolus/fibular plate.    < from: CT Abdomen and Pelvis w/ IV Cont (09.07.23 @ 16:55) >  IMPRESSION:    Diffuse urinary bladder wall thickening, mucosal hyperenhancement,   compatible with urinary bladder infection, cystitis.  Increased right renal pelvis and ureter urothelial enhancement,   compatible with ascending urinary tract infection.    < from: CT Neck Soft Tissue w/ IV Cont (09.07.23 @ 16:54) >  IMPRESSION:    1.  Multiple dental caries and periapical lucencies. Recommend follow-up   with dental exam.    2.  Apparent 3 mm rim-enhancing fluid collection overlying the right   mandibular alveolar ridge, potentially a tiny abscess (ser 4 im 87).    3.  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    4.  Scattered groundglass opacities and tree-in-bud nodules. Etiology   favors infection/inflammation.    MEDICATIONS  (STANDING):  acetaminophen     Tablet .. 975 milliGRAM(s) Oral every 8 hours  cefTRIAXone   IVPB 2000 milliGRAM(s) IV Intermittent every 24 hours  chlorhexidine 2% Cloths 1 Application(s) Topical <User Schedule>  heparin   Injectable 5000 Unit(s) SubCutaneous every 12 hours  influenza   Vaccine 0.5 milliLiter(s) IntraMuscular once  metroNIDAZOLE  IVPB      metroNIDAZOLE  IVPB 500 milliGRAM(s) IV Intermittent every 8 hours  pantoprazole    Tablet 40 milliGRAM(s) Oral before breakfast  polyethylene glycol 3350 17 Gram(s) Oral daily  sodium chloride 0.9%. 1000 milliLiter(s) (75 mL/Hr) IV Continuous <Continuous>    MEDICATIONS  (PRN):  albuterol    90 MICROgram(s) HFA Inhaler 2 Puff(s) Inhalation every 6 hours PRN Shortness of Breath and/or Wheezing  benzocaine 20% Spray 1 Spray(s) Topical three times a day PRN throat pain  HYDROmorphone  Injectable 1 milliGRAM(s) IV Push every 4 hours PRN Severe Pain (7 - 10)  melatonin 3 milliGRAM(s) Oral at bedtime PRN Sleep  ondansetron Injectable 4 milliGRAM(s) IV Push every 6 hours PRN Nausea and/or Vomiting            
57-year-old female past medical history of asthma, hypertension, osteomyelitis of left ankle (not on Ab), prediabetes presented for 7-10 day history of difficulty urinating, found to have DILIP, urinary retention, but infection was mostly likely originating from LLE infected hard ware. She was consulted by ortho, scheduled for OR today.   This morning pt was comfortable denies any specific complaints.       Vital Signs Last 24 Hrs  T(C): 36.6 (13 Sep 2023 14:34), Max: 37 (12 Sep 2023 16:49)  T(F): 97.8 (13 Sep 2023 14:34), Max: 98.2 (12 Sep 2023 17:52)  HR: 68 (13 Sep 2023 14:34) (58 - 76)  BP: 123/66 (13 Sep 2023 14:34) (99/61 - 132/65)  BP(mean): --  RR: 18 (13 Sep 2023 14:34) (18 - 18)  SpO2: 97% (12 Sep 2023 22:05) (96% - 97%)    Parameters below as of 12 Sep 2023 22:05  Patient On (Oxygen Delivery Method): room air      PHYSICAL EXAM:  GENERAL: NAD, skinny lady, pleasant   HEAD:  Atraumatic, Normocephalic  NECK: Supple, No JVD, Normal thyroid  NERVOUS SYSTEM:  Alert & Oriented X3, Good concentration; Motor Strength 5/5 B/L upper and lower extremities; DTRs 2+ intact and symmetric  CHEST/LUNG: CTA b/l   HEART: S1, S2, RRR   ABDOMEN: Soft, Nontender, Nondistended; Bowel sounds present  EXTREMITIES:  2+ Peripheral Pulses, No clubbing, cyanosis, or edema, dressing noted on left ankle       LABS:                        9.8    12.93 )-----------( 275      ( 13 Sep 2023 06:55 )             29.3     09-13    140  |  106  |  4<L>  ----------------------------<  87  3.4<L>   |  23  |  0.5<L>    Ca    7.6<L>      13 Sep 2023 11:47  Mg     2.5     09-13    TPro  5.1<L>  /  Alb  2.5<L>  /  TBili  <0.2  /  DBili  x   /  AST  11  /  ALT  6   /  AlkPhos  64  09-12    PT/INR - ( 12 Sep 2023 07:35 )   PT: 10.80 sec;   INR: 0.95 ratio         PTT - ( 12 Sep 2023 07:35 )  PTT:32.8 sec  Urinalysis Basic - ( 13 Sep 2023 11:47 )    Color: x / Appearance: x / SG: x / pH: x  Gluc: 87 mg/dL / Ketone: x  / Bili: x / Urobili: x   Blood: x / Protein: x / Nitrite: x   Leuk Esterase: x / RBC: x / WBC x   Sq Epi: x / Non Sq Epi: x / Bacteria: x    Culture - Blood in AM (09.08.23 @ 06:33)   Specimen Source: .Blood None  Culture Results:   No growth at 5 daysCulture - Urine (09.07.23 @ 16:00)   Specimen Source: Clean Catch Clean Catch (Midstream)  Culture Results:   No growth    RADIOLOGY & ADDITIONAL TESTS:  < from: Xray Foot AP + Lateral + Oblique, Left (09.08.23 @ 13:54) >  FINDINGS/  IMPRESSION:    The patient is status post screw and plate fixation of the distal fibula.   There is a subacute or chronic minimally displaced incompletely healed   fracture of the fibula at the proximal margin of the hardware,   demonstrating incomplete bony bridging/callus formation. The hardware is   intact.    There is no radiographic evidence of osteomyelitis. No acute fracture is   seen. There is no dislocation.There is no definite ankle mortise   asymmetry.    Osteopenia is noted. There is evidence of skin ulceration over the   lateral malleolus/fibular plate.    < from: CT Abdomen and Pelvis w/ IV Cont (09.07.23 @ 16:55) >  IMPRESSION:    Diffuse urinary bladder wall thickening, mucosal hyperenhancement,   compatible with urinary bladder infection, cystitis.  Increased right renal pelvis and ureter urothelial enhancement,   compatible with ascending urinary tract infection.    < from: CT Neck Soft Tissue w/ IV Cont (09.07.23 @ 16:54) >  IMPRESSION:    1.  Multiple dental caries and periapical lucencies. Recommend follow-up   with dental exam.    2.  Apparent 3 mm rim-enhancing fluid collection overlying the right   mandibular alveolar ridge, potentially a tiny abscess (ser 4 im 87).    3.  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    4.  Scattered groundglass opacities and tree-in-bud nodules. Etiology   favors infection/inflammation.    MEDICATIONS  (STANDING):  cefepime   IVPB      cefepime   IVPB 1000 milliGRAM(s) IV Intermittent every 12 hours  influenza   Vaccine 0.5 milliLiter(s) IntraMuscular once  lactated ringers. 1000 milliLiter(s) (75 mL/Hr) IV Continuous <Continuous>  metroNIDAZOLE  IVPB 500 milliGRAM(s) IV Intermittent every 8 hours  metroNIDAZOLE  IVPB      pantoprazole    Tablet 40 milliGRAM(s) Oral before breakfast  polyethylene glycol 3350 17 Gram(s) Oral daily    MEDICATIONS  (PRN):  acetaminophen     Tablet .. 650 milliGRAM(s) Oral every 6 hours PRN Temp greater or equal to 38C (100.4F), Mild Pain (1 - 3)  albuterol    90 MICROgram(s) HFA Inhaler 2 Puff(s) Inhalation every 6 hours PRN Shortness of Breath and/or Wheezing  benzocaine 20% Spray 1 Spray(s) Topical three times a day PRN throat pain  melatonin 3 milliGRAM(s) Oral at bedtime PRN Sleep  ondansetron Injectable 4 milliGRAM(s) IV Push every 6 hours PRN Nausea and/or Vomiting      
LUZ MARINA WISE 57y Female  MRN#: [id number]   Hospital Day: 11d    SUBJECTIVE  Patient is a 57y old Female who presents with a chief complaint of urinary symptoms (18 Sep 2023 11:58)  Currently admitted to medicine with the primary diagnosis of Urinary retention      INTERVAL HPI AND OVERNIGHT EVENTS:  Patient was examined and seen at bedside. Picc line placed by NANCY rodney updated (switching abx to cefazolin 2g q8 hours)    OBJECTIVE  PAST MEDICAL & SURGICAL HISTORY    ALLERGIES:  penicillin (Anaphylaxis)    MEDICATIONS:  STANDING MEDICATIONS  acetaminophen     Tablet .. 975 milliGRAM(s) Oral every 8 hours  ceFAZolin   IVPB 2000 milliGRAM(s) IV Intermittent every 8 hours  chlorhexidine 2% Cloths 1 Application(s) Topical <User Schedule>  heparin   Injectable 5000 Unit(s) SubCutaneous every 12 hours  influenza   Vaccine 0.5 milliLiter(s) IntraMuscular once  magnesium sulfate  IVPB 2 Gram(s) IV Intermittent every 2 hours  pantoprazole    Tablet 40 milliGRAM(s) Oral before breakfast  polyethylene glycol 3350 17 Gram(s) Oral daily  sodium chloride 0.9%. 1000 milliLiter(s) IV Continuous <Continuous>    PRN MEDICATIONS  albuterol    90 MICROgram(s) HFA Inhaler 2 Puff(s) Inhalation every 6 hours PRN  benzocaine 20% Spray 1 Spray(s) Topical three times a day PRN  HYDROmorphone  Injectable 1 milliGRAM(s) IV Push every 4 hours PRN  melatonin 3 milliGRAM(s) Oral at bedtime PRN  ondansetron Injectable 4 milliGRAM(s) IV Push every 6 hours PRN      VITAL SIGNS: Last 24 Hours  T(C): 37.2 (18 Sep 2023 13:20), Max: 37.2 (18 Sep 2023 13:20)  T(F): 98.9 (18 Sep 2023 13:20), Max: 98.9 (18 Sep 2023 13:20)  HR: 63 (18 Sep 2023 13:20) (63 - 73)  BP: 147/67 (18 Sep 2023 13:20) (113/61 - 147/67)  BP(mean): --  RR: 18 (18 Sep 2023 13:20) (18 - 18)  SpO2: --    LABS:                        8.5    7.09  )-----------( 362      ( 18 Sep 2023 06:13 )             26.4     09-18    142  |  109  |  6<L>  ----------------------------<  84  3.6   |  23  |  0.6<L>    Ca    8.1<L>      18 Sep 2023 06:13  Mg     1.6     09-18    TPro  4.8<L>  /  Alb  2.6<L>  /  TBili  <0.2  /  DBili  x   /  AST  10  /  ALT  <5  /  AlkPhos  63  09-18      Urinalysis Basic - ( 18 Sep 2023 06:13 )    Color: x / Appearance: x / SG: x / pH: x  Gluc: 84 mg/dL / Ketone: x  / Bili: x / Urobili: x   Blood: x / Protein: x / Nitrite: x   Leuk Esterase: x / RBC: x / WBC x   Sq Epi: x / Non Sq Epi: x / Bacteria: x                RADIOLOGY:      PHYSICAL EXAM:  GENERAL: NAD, skinny lady, pleasant   HEAD:  Atraumatic, Normocephalic  NECK: Supple, No JVD, Normal thyroid  NERVOUS SYSTEM:  Alert & Oriented X3, Good concentration; Motor Strength 5/5 B/L upper and lower extremities; DTRs 2+ intact and symmetric  CHEST/LUNG: CTA b/l   HEART: S1, S2, RRR   ABDOMEN: Soft, Nontender, Nondistended; Bowel sounds present  EXTREMITIES:  2+ Peripheral Pulses, No clubbing, cyanosis, or edema, dressing noted on left ankle     
LUZ MARINA WISE  57y, Female  Allergy: penicillin (Anaphylaxis)      LOS  8d    CHIEF COMPLAINT: urinary symptoms (15 Sep 2023 15:08)      INTERVAL EVENTS/HPI  - No acute events overnight  - T(F): , Max: 97.3 (09-14-23 @ 19:33)  - Denies any worsening symptoms  - Tolerating medication  - WBC Count: 10.66 (09-15-23 @ 07:44)  WBC Count: 13.07 (09-14-23 @ 14:51)     - Creatinine: 0.6 (09-15-23 @ 07:44)  Creatinine: 0.6 (09-14-23 @ 07:37)       ROS  General: Denies rigors, nightsweats  HEENT: Denies headache, rhinorrhea, sore throat, eye pain  CV: Denies CP, palpitations  PULM: Denies wheezing, hemoptysis  GI: Denies hematemesis, hematochezia, melena  : Denies discharge, hematuria  MSK: Denies arthralgias, myalgias  SKIN: Denies rash, lesions  NEURO: Denies paresthesias, weakness  PSYCH: Denies depression, anxiety    VITALS:  T(F): 97, Max: 97.3 (09-14-23 @ 19:33)  HR: 65  BP: 106/60  RR: 18Vital Signs Last 24 Hrs  T(C): 36.1 (15 Sep 2023 13:05), Max: 36.3 (14 Sep 2023 19:33)  T(F): 97 (15 Sep 2023 13:05), Max: 97.3 (14 Sep 2023 19:33)  HR: 65 (15 Sep 2023 13:05) (65 - 73)  BP: 106/60 (15 Sep 2023 13:05) (94/58 - 106/60)  BP(mean): --  RR: 18 (15 Sep 2023 13:05) (18 - 18)  SpO2: 96% (15 Sep 2023 04:43) (96% - 96%)        PHYSICAL EXAM:  Gen: NAD, resting in bed  HEENT: Normocephalic, atraumatic  Neck: supple, no lymphadenopathy  CV: Regular rate & regular rhythm  Lungs: decreased BS at bases, no fremitus  Abdomen: Soft, BS present  Ext: Warm, well perfused  Neuro: non focal, awake  Skin: no rash, no erythema  Lines: no phlebitis    FH: Non-contributory  Social Hx: Non-contributory    TESTS & MEASUREMENTS:                        9.0    10.66 )-----------( 258      ( 15 Sep 2023 07:44 )             26.9     09-15    139  |  108  |  5<L>  ----------------------------<  82  3.5   |  20  |  0.6<L>    Ca    7.8<L>      15 Sep 2023 07:44  Mg     1.7     09-15    TPro  4.8<L>  /  Alb  2.5<L>  /  TBili  <0.2  /  DBili  x   /  AST  11  /  ALT  6   /  AlkPhos  63  09-15      LIVER FUNCTIONS - ( 15 Sep 2023 07:44 )  Alb: 2.5 g/dL / Pro: 4.8 g/dL / ALK PHOS: 63 U/L / ALT: 6 U/L / AST: 11 U/L / GGT: x           Urinalysis Basic - ( 15 Sep 2023 07:44 )    Color: x / Appearance: x / SG: x / pH: x  Gluc: 82 mg/dL / Ketone: x  / Bili: x / Urobili: x   Blood: x / Protein: x / Nitrite: x   Leuk Esterase: x / RBC: x / WBC x   Sq Epi: x / Non Sq Epi: x / Bacteria: x        Culture - Surgical Swab (collected 09-13-23 @ 19:02)  Source: .Surgical Swab LEFT ANKLE  Preliminary Report (09-15-23 @ 06:34):    No growth    Culture - Acid Fast - Other w/Smear (collected 09-13-23 @ 19:01)  Source: .Other None    Culture - Acid Fast - Other w/Smear (collected 09-13-23 @ 19:01)  Source: .Other None    Culture - Fungal, Other (collected 09-13-23 @ 19:01)  Source: .Other None  Preliminary Report (09-15-23 @ 07:30):    Testing in progress    Culture - Fungal, Other (collected 09-13-23 @ 19:01)  Source: .Other None  Preliminary Report (09-14-23 @ 14:07):    Testing in progress    Culture - Other (collected 09-13-23 @ 19:01)  Source: .Other None  Preliminary Report (09-14-23 @ 22:57):    No growth    Culture - Fungal, Tissue (collected 09-13-23 @ 19:00)  Source: .Tissue left ankle  Preliminary Report (09-14-23 @ 14:13):    Testing in progress    Culture - Acid Fast - Tissue w/Smear (collected 09-13-23 @ 19:00)  Source: .Tissue left ankle    Culture - Tissue with Gram Stain (collected 09-13-23 @ 19:00)  Source: .Tissue left ankle  Gram Stain (09-14-23 @ 04:18):    No polymorphonuclear cells seen per low power field    No organisms seen per oil power field  Preliminary Report (09-15-23 @ 06:59):    No growth    Culture - Fungal, Tissue (collected 09-13-23 @ 19:00)  Source: .Tissue left ankle  Preliminary Report (09-15-23 @ 07:42):    Testing in progress    Culture - Acid Fast - Tissue w/Smear (collected 09-13-23 @ 19:00)  Source: .Tissue left ankle    Culture - Tissue with Gram Stain (collected 09-13-23 @ 18:58)  Source: .Tissue left ankle  Gram Stain (09-14-23 @ 04:18):    No polymorphonuclear cells seen per low power field    No organisms seen per oil power field  Preliminary Report (09-15-23 @ 07:00):    No growth    Culture - Blood (collected 09-08-23 @ 06:33)  Source: .Blood None  Final Report (09-13-23 @ 15:08):    No growth at 5 days    Culture - Blood (collected 09-08-23 @ 01:36)  Source: .Blood None  Final Report (09-13-23 @ 07:01):    No growth at 5 days    Culture - Urine (collected 09-07-23 @ 16:00)  Source: Clean Catch Clean Catch (Midstream)  Final Report (09-08-23 @ 22:28):    No growth            INFECTIOUS DISEASES TESTING  MRSA PCR Result.: Negative (09-13-23 @ 09:01)  Procalcitonin, Serum: 0.22 (09-08-23 @ 06:33)      INFLAMMATORY MARKERS      RADIOLOGY & ADDITIONAL TESTS:  I have personally reviewed the last available Chest xray  CXR      CT      CARDIOLOGY TESTING  12 Lead ECG:   Ventricular Rate 51 BPM    Atrial Rate 51 BPM    P-R Interval 138 ms    QRS Duration 84 ms    Q-T Interval 458 ms    QTC Calculation(Bazett) 422 ms    P Axis 77 degrees    R Axis 55 degrees    T Axis 70 degrees    Diagnosis Line Sinus bradycardia with sinus arrhythmia  Otherwise normal ECG    Confirmed by Sudhir Gonzalez ([id number]) on 9/12/2023 8:22:56 PM (09-12-23 @ 09:36)  12 Lead ECG:   Ventricular Rate 104 BPM    Atrial Rate 104 BPM    P-R Interval 122 ms    QRS Duration 72 ms    Q-T Interval 320 ms    QTC Calculation(Bazett) 420 ms    P Axis 78 degrees    R Axis 77 degrees    T Axis 64 degrees    Diagnosis Line Sinus tachycardia  Otherwise normal ECG    Confirmed by Joel Isabel ([id number]) on 9/7/2023 7:30:58 PM (09-07-23 @ 18:53)      MEDICATIONS  acetaminophen     Tablet .. 975 Oral every 8 hours  cefepime   IVPB     cefepime   IVPB 1000 IV Intermittent every 12 hours  chlorhexidine 2% Cloths 1 Topical <User Schedule>  heparin   Injectable 5000 SubCutaneous every 12 hours  influenza   Vaccine 0.5 IntraMuscular once  magnesium oxide 400 Oral two times a day with meals  metroNIDAZOLE  IVPB     metroNIDAZOLE  IVPB 500 IV Intermittent every 8 hours  pantoprazole    Tablet 40 Oral before breakfast  polyethylene glycol 3350 17 Oral daily  sodium chloride 0.9%. 1000 IV Continuous <Continuous>      WEIGHT    Creatinine: 0.6 mg/dL (09-15-23 @ 07:44)      ANTIBIOTICS:  cefepime   IVPB      cefepime   IVPB 1000 milliGRAM(s) IV Intermittent every 12 hours  metroNIDAZOLE  IVPB 500 milliGRAM(s) IV Intermittent every 8 hours  metroNIDAZOLE  IVPB          All available historical records have been reviewed      
LUZ MARINA WISE  57y, Female  Allergy: penicillin (Anaphylaxis)      LOS  5d    CHIEF COMPLAINT: urinary symptoms (11 Sep 2023 16:31)      INTERVAL EVENTS/HPI  - No acute events overnight  - T(F): , Max: 97.8 (09-12-23 @ 04:08)  - Denies any worsening symptoms  - Tolerating medication  - WBC Count: 8.85 (09-12-23 @ 07:35)  WBC Count: 10.92 (09-11-23 @ 19:19)     - Creatinine: 0.6 (09-12-23 @ 11:10)  Creatinine: 0.7 (09-12-23 @ 07:35)       ROS  General: Denies rigors, nightsweats  HEENT: Denies headache, rhinorrhea, sore throat, eye pain  CV: Denies CP, palpitations  PULM: Denies wheezing, hemoptysis  GI: Denies hematemesis, hematochezia, melena  : Denies discharge, hematuria  MSK: Denies arthralgias, myalgias  SKIN: Denies rash, lesions  NEURO: Denies paresthesias, weakness  PSYCH: Denies depression, anxiety    VITALS:  T(F): 97.6, Max: 97.8 (09-12-23 @ 04:08)  HR: 62  BP: 127/62  RR: 18Vital Signs Last 24 Hrs  T(C): 36.4 (12 Sep 2023 05:00), Max: 36.6 (12 Sep 2023 04:08)  T(F): 97.6 (12 Sep 2023 05:00), Max: 97.8 (12 Sep 2023 04:08)  HR: 62 (12 Sep 2023 05:00) (60 - 66)  BP: 127/62 (12 Sep 2023 05:00) (89/54 - 127/62)  BP(mean): --  RR: 18 (12 Sep 2023 05:00) (18 - 18)  SpO2: 96% (11 Sep 2023 21:57) (96% - 96%)        PHYSICAL EXAM:  Gen: NAD, resting in bed  HEENT: Normocephalic, atraumatic  Neck: supple, no lymphadenopathy  CV: Regular rate & regular rhythm  Lungs: decreased BS at bases, no fremitus  Abdomen: Soft, BS present  Ext: Warm, well perfused  Neuro: non focal, awake  Skin: left ankle with continued drainage   Lines: no phlebitis    FH: Non-contributory  Social Hx: Non-contributory    TESTS & MEASUREMENTS:                        9.1    8.85  )-----------( 291      ( 12 Sep 2023 07:35 )             26.5     09-12    137  |  102  |  5<L>  ----------------------------<  89  3.0<L>   |  24  |  0.6<L>    Ca    7.6<L>      12 Sep 2023 11:10  Mg     1.5     09-12    TPro  5.1<L>  /  Alb  2.5<L>  /  TBili  <0.2  /  DBili  x   /  AST  11  /  ALT  6   /  AlkPhos  64  09-12      LIVER FUNCTIONS - ( 12 Sep 2023 07:35 )  Alb: 2.5 g/dL / Pro: 5.1 g/dL / ALK PHOS: 64 U/L / ALT: 6 U/L / AST: 11 U/L / GGT: x           Urinalysis Basic - ( 12 Sep 2023 11:10 )    Color: x / Appearance: x / SG: x / pH: x  Gluc: 89 mg/dL / Ketone: x  / Bili: x / Urobili: x   Blood: x / Protein: x / Nitrite: x   Leuk Esterase: x / RBC: x / WBC x   Sq Epi: x / Non Sq Epi: x / Bacteria: x        Culture - Blood (collected 09-08-23 @ 06:33)  Source: .Blood None  Preliminary Report (09-11-23 @ 15:01):    No growth at 72 Hours    Culture - Blood (collected 09-08-23 @ 01:36)  Source: .Blood None  Preliminary Report (09-12-23 @ 07:00):    No growth at 4 days    Culture - Urine (collected 09-07-23 @ 16:00)  Source: Clean Catch Clean Catch (Midstream)  Final Report (09-08-23 @ 22:28):    No growth        Lactate, Blood: 0.8 mmol/L (09-08-23 @ 06:33)  Lactate, Blood: 1.6 mmol/L (09-07-23 @ 16:00)      INFECTIOUS DISEASES TESTING  Procalcitonin, Serum: 0.22 (09-08-23 @ 06:33)      INFLAMMATORY MARKERS      RADIOLOGY & ADDITIONAL TESTS:  I have personally reviewed the last available Chest xray  CXR      CT      CARDIOLOGY TESTING  12 Lead ECG:   Ventricular Rate 104 BPM    Atrial Rate 104 BPM    P-R Interval 122 ms    QRS Duration 72 ms    Q-T Interval 320 ms    QTC Calculation(Bazett) 420 ms    P Axis 78 degrees    R Axis 77 degrees    T Axis 64 degrees    Diagnosis Line Sinus tachycardia  Otherwise normal ECG    Confirmed by Joel Isabel ([id number]) on 9/7/2023 7:30:58 PM (09-07-23 @ 18:53)      MEDICATIONS  cefepime   IVPB     cefepime   IVPB 1000 IV Intermittent every 12 hours  influenza   Vaccine 0.5 IntraMuscular once  lactated ringers. 1000 IV Continuous <Continuous>  magnesium sulfate  IVPB 2 IV Intermittent once  metroNIDAZOLE  IVPB 500 IV Intermittent every 8 hours  metroNIDAZOLE  IVPB     pantoprazole    Tablet 40 Oral before breakfast  polyethylene glycol 3350 17 Oral daily      WEIGHT    Creatinine: 0.6 mg/dL (09-12-23 @ 11:10)  Creatinine: 0.7 mg/dL (09-12-23 @ 07:35)  Creatinine: 0.8 mg/dL (09-11-23 @ 19:19)      ANTIBIOTICS:  cefepime   IVPB      cefepime   IVPB 1000 milliGRAM(s) IV Intermittent every 12 hours  metroNIDAZOLE  IVPB      metroNIDAZOLE  IVPB 500 milliGRAM(s) IV Intermittent every 8 hours      All available historical records have been reviewed

## 2023-09-19 NOTE — PROGRESS NOTE ADULT - ASSESSMENT
57-year-old female past medical history of asthma, hypertension, osteomyelitis of left ankle (not on Ab), prediabetes presented for 7-10 day history of difficulty urinating, found to have DILIP, urinary retention.     A/P  # Sepsis POA secondary to Acute UTI /Dental carries/  Abscess adjacent to R mandible  - resolved now   - ID is following, recommendations noted:  - c/w  cefazolin 2g  8 hours (allergies reviewed - cefazolin does not share side chain similarities with penicillin)  - Plan for 6 week course from debridement   - s/p  PICC line on 9/18  - Dental consult  appreciated, OP follow up recommended.     # Left Ankle OM with chronic sinus draining tract  - consulted by ortho, hard wear was  removed on 9/13  - c/w Abx as per ID, see above   - PT as tolerated, pt is agreeable to SNF now     # Anemia  - monitor H/H, keep Hb above 7.5  - no active bleeding noted   - if Hb drops send anemia work up      #DILIP 2/2 obstructive uropathy/ Acute urinary retention   - failed TOV 9/9 and on 9/14, pt  - c/w  straight cath now     # Hypomagnesemia  - replete Mg     # HTN  - DASH diet     # Asthma   -USHA PRN      DVT prophylaxis: heparin    Pending: placement to SNF.   Plan of care d/w patient   Dispo: SNF

## 2023-09-19 NOTE — DISCHARGE NOTE NURSING/CASE MANAGEMENT/SOCIAL WORK - PATIENT PORTAL LINK FT
You can access the FollowMyHealth Patient Portal offered by [facility] by registering at the following website: [email or web address]. By joining [employer]’s FollowMyHealth portal, you will also be able to view your health information using other applications (apps) compatible with our system.

## 2023-09-19 NOTE — DISCHARGE NOTE PROVIDER - CARE PROVIDERS DIRECT ADDRESSES
,DirectAddress_Unknown ,DirectAddress_Unknown,DirectAddress_Unknown,[email or web address]

## 2023-09-19 NOTE — DISCHARGE NOTE PROVIDER - NSDCFUADDINST_GEN_ALL_CORE_FT
Remain Non-Weight Bearing x 2 weeks. Then if wounds stable, WBAT in cam BOOT. Remain Non-Weight Bearing x 2 weeks (9/28). Then if wounds stable, WBAT in cam BOOT.  F/u w/ outpatient dentist for comprehensive dental care.  Remain Non-Weight Bearing x 2 weeks (9/28). Then if wounds stable, WBAT in cam BOOT.  F/u w/ outpatient dentist for comprehensive dental care.   - continue cefazolin 2g q 8 hours   - plan for 6 weeks post debridement (end date 10/24)     - Weekly CBC, CMP, ESR/CRP  - ID follow-up with Dr. Amarjit Shay for Telehealth. We will call the patient between 10:30-1:30      [id number]       [phone]       Fax [phone]    Follow-up with Dr. Fernandez from Orthopedics as an OP 1-week post-discharge for wound check.

## 2023-09-19 NOTE — PROGRESS NOTE ADULT - ASSESSMENT
ASSESSMENT  57-year-old female past medical history of asthma, hypertension, osteomyelitis of left ankle (not on Ab), prediabetes presented for 7-10 day history of difficulty urinating.    IMPRESSION  #Ascending UTI - Urine Cx NG    #Dental infection with small abscess   - CT Neck Soft Tissue w/ IV Cont (09.07.23 @ 16:54): 1.  Multiple dental caries and periapical lucencies. Recommend follow-up  with dental exam. 2.  Apparent 3 mm rim-enhancing fluid collection overlying the right  mandibular alveolar ridge, potentially a tiny abscess (ser 4 im 87). 3.  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 4.  Scattered groundglass opacities and tree-in-bud nodules. Etiology  favors infection/inflammation.    #OM of left ankle with chroic sinus draining wound   - followed by Ortho - recommended for removal ofhardware and prolonged antibiotic course - scheduled on 9/13  - Xray Foot AP + Lateral + Oblique, Left (09.08.23 @ 13:54): The patient is status post screw and plate fixation of the distal fibula.  There is a subacute or chronic minimally displaced incompletely healed  fracture of the fibula at the proximal margin of the hardware,  demonstrating incomplete bony bridging/callus formation. The hardware is   intact. There is no radiographic evidence of osteomyelitis. No acute fracture is  seen. There is no dislocation.There is no definite ankle mortise  asymmetry. Osteopenia is noted. There is evidence of skin ulceration over the  lateral malleolus/fibular plate.  - s/p debridement with removal of implant -- found to have purulence 9/13 -- OR Cx MSSA    #Abx allergy: penicillin (Anaphylaxis)    RECOMMENDATIONS  - continue cefazolin 2g q 8 hours   - plan for 6 weeks post debridement (end date 10/24)     - Weekly CBC, CMP, ESR/CRP  - ID follow-up with Dr. Amarjit Shay for Telehealth. We will call the patient between 10:30-1:30      [email or web address]       [phone]       Fax [phone]    Please call or message on [employer] Teams if with any questions.  Spectra [id number]

## 2023-09-20 VITALS
HEART RATE: 58 BPM | TEMPERATURE: 97 F | RESPIRATION RATE: 18 BRPM | SYSTOLIC BLOOD PRESSURE: 174 MMHG | DIASTOLIC BLOOD PRESSURE: 79 MMHG

## 2023-09-20 LAB
CULTURE RESULTS: SIGNIFICANT CHANGE UP
SPECIMEN SOURCE: SIGNIFICANT CHANGE UP

## 2023-09-25 DIAGNOSIS — E86.0 DEHYDRATION: ICD-10-CM

## 2023-09-25 DIAGNOSIS — I67.1 CEREBRAL ANEURYSM, NONRUPTURED: ICD-10-CM

## 2023-09-25 DIAGNOSIS — E87.6 HYPOKALEMIA: ICD-10-CM

## 2023-09-25 DIAGNOSIS — K01.1 IMPACTED TEETH: ICD-10-CM

## 2023-09-25 DIAGNOSIS — Z20.822 CONTACT WITH AND (SUSPECTED) EXPOSURE TO COVID-19: ICD-10-CM

## 2023-09-25 DIAGNOSIS — M96.672 FRACTURE OF TIBIA OR FIBULA FOLLOWING INSERTION OF ORTHOPEDIC IMPLANT, JOINT PROSTHESIS, OR BONE PLATE, LEFT LEG: ICD-10-CM

## 2023-09-25 DIAGNOSIS — E83.51 HYPOCALCEMIA: ICD-10-CM

## 2023-09-25 DIAGNOSIS — Z88.0 ALLERGY STATUS TO PENICILLIN: ICD-10-CM

## 2023-09-25 DIAGNOSIS — L97.329 NON-PRESSURE CHRONIC ULCER OF LEFT ANKLE WITH UNSPECIFIED SEVERITY: ICD-10-CM

## 2023-09-25 DIAGNOSIS — R33.9 RETENTION OF URINE, UNSPECIFIED: ICD-10-CM

## 2023-09-25 DIAGNOSIS — K02.9 DENTAL CARIES, UNSPECIFIED: ICD-10-CM

## 2023-09-25 DIAGNOSIS — R73.03 PREDIABETES: ICD-10-CM

## 2023-09-25 DIAGNOSIS — B95.61 METHICILLIN SUSCEPTIBLE STAPHYLOCOCCUS AUREUS INFECTION AS THE CAUSE OF DISEASES CLASSIFIED ELSEWHERE: ICD-10-CM

## 2023-09-25 DIAGNOSIS — N13.6 PYONEPHROSIS: ICD-10-CM

## 2023-09-25 DIAGNOSIS — E83.42 HYPOMAGNESEMIA: ICD-10-CM

## 2023-09-25 DIAGNOSIS — E87.5 HYPERKALEMIA: ICD-10-CM

## 2023-09-25 DIAGNOSIS — M86.472 CHRONIC OSTEOMYELITIS WITH DRAINING SINUS, LEFT ANKLE AND FOOT: ICD-10-CM

## 2023-09-25 DIAGNOSIS — A41.9 SEPSIS, UNSPECIFIED ORGANISM: ICD-10-CM

## 2023-09-25 DIAGNOSIS — J45.909 UNSPECIFIED ASTHMA, UNCOMPLICATED: ICD-10-CM

## 2023-09-25 DIAGNOSIS — K04.7 PERIAPICAL ABSCESS WITHOUT SINUS: ICD-10-CM

## 2023-09-25 DIAGNOSIS — E87.1 HYPO-OSMOLALITY AND HYPONATREMIA: ICD-10-CM

## 2023-09-25 DIAGNOSIS — I10 ESSENTIAL (PRIMARY) HYPERTENSION: ICD-10-CM

## 2023-09-25 DIAGNOSIS — M85.872 OTHER SPECIFIED DISORDERS OF BONE DENSITY AND STRUCTURE, LEFT ANKLE AND FOOT: ICD-10-CM

## 2023-09-25 DIAGNOSIS — N17.9 ACUTE KIDNEY FAILURE, UNSPECIFIED: ICD-10-CM

## 2023-09-25 DIAGNOSIS — D64.9 ANEMIA, UNSPECIFIED: ICD-10-CM

## 2023-09-25 DIAGNOSIS — T84.625A INFECTION AND INFLAMMATORY REACTION DUE TO INTERNAL FIXATION DEVICE OF LEFT FIBULA, INITIAL ENCOUNTER: ICD-10-CM

## 2023-09-25 DIAGNOSIS — E87.20 ACIDOSIS, UNSPECIFIED: ICD-10-CM

## 2023-09-28 ENCOUNTER — APPOINTMENT (OUTPATIENT)
Dept: ORTHOPEDIC SURGERY | Facility: CLINIC | Age: 58
End: 2023-09-28

## 2023-10-12 ENCOUNTER — APPOINTMENT (OUTPATIENT)
Dept: ORTHOPEDIC SURGERY | Facility: CLINIC | Age: 58
End: 2023-10-12
Payer: MEDICAID

## 2023-10-12 DIAGNOSIS — T81.42XS: ICD-10-CM

## 2023-10-12 PROCEDURE — 99024 POSTOP FOLLOW-UP VISIT: CPT

## 2023-10-12 PROCEDURE — 73610 X-RAY EXAM OF ANKLE: CPT | Mod: LT

## 2023-10-14 LAB
CULTURE RESULTS: SIGNIFICANT CHANGE UP
SPECIMEN SOURCE: SIGNIFICANT CHANGE UP

## 2023-11-01 LAB

## 2024-01-19 RX ORDER — AMLODIPINE BESYLATE 2.5 MG/1
1 TABLET ORAL
Refills: 0 | DISCHARGE

## 2024-02-01 NOTE — DISCHARGE NOTE PROVIDER - NSDCQMSTROKE_NEU_ALL_CORE
No Patient is A&Ox0 on admission, currently improved to ANO2-3. AMS likely 2/2 toxic metabolic derangements as below, but per neuro less likely neurovascular etiology. Also with known parkinson's disease for the past 8 months.  Per daughter at baseline patient is A&Ox3 and conversant but has been deteriorating since mid-jan.     CTH, CTA H/N with "No acute intracranial hemorrhage, mass, or large vessel occlusion. No aneurysm. prominent lateral and third ventricles; severely diminutive veribrobasilar system with small areas of minimal filling in   the basilar artery. Prominent b/l PCOMs"  fu MRI Brain and MRA  per neuro: start aspirin 81mg  optho eval   Management for electrolyte abnormalities as below and treat UTI as below  S/S eval- pt refused to participate with SLP. pt is alert but refuses to eat, discussed with patient and daughter, who wants to try pureed diet, Understands apiration risk, wants to hold off ng tube rn.    Neuro checks

## 2024-09-06 ENCOUNTER — APPOINTMENT (OUTPATIENT)
Dept: ORTHOPEDIC SURGERY | Facility: CLINIC | Age: 59
End: 2024-09-06

## 2024-09-17 ENCOUNTER — APPOINTMENT (OUTPATIENT)
Dept: ORTHOPEDIC SURGERY | Facility: CLINIC | Age: 59
End: 2024-09-17